# Patient Record
Sex: MALE | Race: WHITE | NOT HISPANIC OR LATINO | Employment: OTHER | RURAL
[De-identification: names, ages, dates, MRNs, and addresses within clinical notes are randomized per-mention and may not be internally consistent; named-entity substitution may affect disease eponyms.]

---

## 2017-02-06 ENCOUNTER — OFFICE VISIT (OUTPATIENT)
Dept: FAMILY MEDICINE CLINIC | Facility: CLINIC | Age: 42
End: 2017-02-06

## 2017-02-06 VITALS
HEART RATE: 92 BPM | OXYGEN SATURATION: 98 % | DIASTOLIC BLOOD PRESSURE: 80 MMHG | WEIGHT: 260 LBS | HEIGHT: 71 IN | TEMPERATURE: 97.9 F | SYSTOLIC BLOOD PRESSURE: 132 MMHG | BODY MASS INDEX: 36.4 KG/M2

## 2017-02-06 DIAGNOSIS — M54.50 CHRONIC BILATERAL LOW BACK PAIN WITHOUT SCIATICA: Primary | ICD-10-CM

## 2017-02-06 DIAGNOSIS — G89.29 CHRONIC BILATERAL LOW BACK PAIN WITHOUT SCIATICA: Primary | ICD-10-CM

## 2017-02-06 PROCEDURE — 99212 OFFICE O/P EST SF 10 MIN: CPT | Performed by: FAMILY MEDICINE

## 2017-02-06 RX ORDER — HYDROCODONE BITARTRATE AND ACETAMINOPHEN 7.5; 325 MG/1; MG/1
1 TABLET ORAL 3 TIMES DAILY
Qty: 90 TABLET | Refills: 0 | Status: SHIPPED | OUTPATIENT
Start: 2017-02-06 | End: 2017-03-06 | Stop reason: SDUPTHER

## 2017-02-06 NOTE — PROGRESS NOTES
"Trevor Hernandez    1975    Chief Complaint   Patient presents with   • Med Refill     FLEX       Vitals:    02/06/17 1532   BP: 132/80   Pulse: 92   Temp: 97.9 °F (36.6 °C)   SpO2: 98%   Weight: 260 lb (118 kg)   Height: 71\" (180.3 cm)       Back Pain   This is a chronic problem. The current episode started more than 1 year ago. The problem occurs constantly. The problem is unchanged. The pain is present in the sacro-iliac. The quality of the pain is described as aching. The pain does not radiate. The pain is at a severity of 4/10. The pain is moderate. The pain is the same all the time. The symptoms are aggravated by bending and sitting. Stiffness is present in the morning. Risk factors: Lumbar laminectomy. He has tried heat and home exercises for the symptoms. The treatment provided mild relief.       Review of Systems   Endocrine:        Diabetic-sees diabetologist   Musculoskeletal: Positive for back pain.       Past Medical History   Diagnosis Date   • Chronic painful diabetic neuropathy    • Diabetic neuropathy    • Dorsopathy    • Dyslipidemia    • Elevated blood pressure    • Hypertension    • Neuromuscular disorder    • Obesity    • Type 2 diabetes mellitus          Current Outpatient Prescriptions:   •  Albiglutide (TANZEUM) 50 MG pen-injector, Inject 50 mg under the skin 1 (One) Time Per Week., Disp: , Rfl:   •  butalbital-acetaminophen-caffeine (FIORICET, ESGIC) -40 MG per tablet, Take  tablets by mouth Every 6 (Six) Hours As Needed., Disp: , Rfl:   •  Canagliflozin (INVOKANA) 100 MG tablet, Take 100 mg by mouth Daily., Disp: , Rfl:   •  Canagliflozin-Metformin HCl (INVOKAMET)  MG tablet, Take 1 tablet by mouth 2 (Two) Times a Day. one tablet po bid. discontinue separate metformin and invokana prescription since these combines the 2 medicines., Disp: , Rfl:   •  cyclobenzaprine (FLEXERIL) 10 MG tablet, Take 10 mg by mouth Every 8 (Eight) Hours As Needed., Disp: , Rfl:   • " " Dapagliflozin-Metformin HCl ER (XIGDUO XR) 5-1000 MG tablet sustained-release 24 hour, Take 2 tablets by mouth Daily With Breakfast., Disp: , Rfl:   •  gabapentin (NEURONTIN) 800 MG tablet, Take 1,200 mg by mouth 3 (Three) Times a Day., Disp: , Rfl:   •  HUMALOG KWIKPEN 100 UNIT/ML solution pen-injector, , Disp: , Rfl:   •  HYDROcodone-acetaminophen (NORCO) 7.5-325 MG per tablet, Take 1 tablet by mouth 3 (Three) Times a Day., Disp: 90 tablet, Rfl: 0  •  Insulin Glulisine (APIDRA SOLOSTAR) 100 UNIT/ML solution pen-injector, Inject 15 Units under the skin. take up to 15 units with meals, if not covered use humalog or novolog., Disp: , Rfl:   •  Insulin Pen Needle (PEN NEEDLES) 31G X 8 MM misc, 3 (Three) Times a Day. use as indicated 3 times daily., Disp: , Rfl:   •  losartan-hydrochlorothiazide (HYZAAR) 100-12.5 MG per tablet, Take 1 tablet by mouth Every Morning., Disp: , Rfl:   •  sertraline (ZOLOFT) 100 MG tablet, Take 100 mg by mouth Daily., Disp: , Rfl:     No Known Allergies    Patient Active Problem List   Diagnosis   • Dorsopathy   • Type II diabetes mellitus, uncontrolled   • Essential hypertension   • Lumbar disc disease with radiculopathy   • Morbid obesity       Social History     Social History   • Marital status:      Spouse name: N/A   • Number of children: N/A   • Years of education: N/A     Social History Main Topics   • Smoking status: Never Smoker   • Smokeless tobacco: None   • Alcohol use None   • Drug use: None   • Sexual activity: Not Asked     Other Topics Concern   • None     Social History Narrative       Past Surgical History   Procedure Laterality Date   • Back surgery         Physical Exam   Constitutional:   Morbidly obese   Neurological: He is alert.   Patellar reflex down the right   Psychiatric: He has a normal mood and affect.   Vitals reviewed.      Vitals:    02/06/17 1532   BP: 132/80   Pulse: 92   Temp: 97.9 °F (36.6 °C)   SpO2: 98%   Weight: 260 lb (118 kg)   Height: 71\" " (180.3 cm)       Trevor was seen today for med refill.    Diagnoses and all orders for this visit:    Chronic bilateral low back pain without sciatica    Other orders  -     HYDROcodone-acetaminophen (NORCO) 7.5-325 MG per tablet; Take 1 tablet by mouth 3 (Three) Times a Day.        Problem List Items Addressed This Visit     None      Visit Diagnoses     Chronic bilateral low back pain without sciatica    -  Primary       Narco 7.5-#90 refill    Core exercises stressed, weight reduction stressed                            Efrem Deng MD  2/6/2017

## 2017-02-10 ENCOUNTER — LAB (OUTPATIENT)
Dept: LAB | Facility: HOSPITAL | Age: 42
End: 2017-02-10
Attending: INTERNAL MEDICINE

## 2017-02-10 ENCOUNTER — TRANSCRIBE ORDERS (OUTPATIENT)
Dept: LAB | Facility: HOSPITAL | Age: 42
End: 2017-02-10

## 2017-02-10 ENCOUNTER — APPOINTMENT (OUTPATIENT)
Dept: LAB | Facility: HOSPITAL | Age: 42
End: 2017-02-10

## 2017-02-10 DIAGNOSIS — E11.9 TYPE 2 DIABETES MELLITUS WITHOUT COMPLICATION, UNSPECIFIED LONG TERM INSULIN USE STATUS: Primary | ICD-10-CM

## 2017-02-10 DIAGNOSIS — E11.8 TYPE 2 DIABETES MELLITUS WITH COMPLICATION, UNSPECIFIED LONG TERM INSULIN USE STATUS: ICD-10-CM

## 2017-02-10 DIAGNOSIS — E11.8 TYPE 2 DIABETES MELLITUS WITH COMPLICATION, UNSPECIFIED LONG TERM INSULIN USE STATUS: Primary | ICD-10-CM

## 2017-02-10 LAB
25(OH)D3 SERPL-MCNC: 21.1 NG/ML (ref 30–100)
ALBUMIN SERPL-MCNC: 4 G/DL (ref 3.4–4.8)
ALBUMIN UR-MCNC: 2.9 MG/L
ALBUMIN/GLOB SERPL: 1.1 G/DL (ref 1.1–1.8)
ALP SERPL-CCNC: 90 U/L (ref 38–126)
ALT SERPL W P-5'-P-CCNC: 48 U/L (ref 21–72)
ANION GAP SERPL CALCULATED.3IONS-SCNC: 12 MMOL/L (ref 5–15)
ARTICHOKE IGE QN: 77 MG/DL (ref 1–129)
AST SERPL-CCNC: 34 U/L (ref 17–59)
BILIRUB SERPL-MCNC: 1 MG/DL (ref 0.2–1.3)
BUN BLD-MCNC: 12 MG/DL (ref 7–21)
BUN/CREAT SERPL: 18.8 (ref 7–25)
CALCIUM SPEC-SCNC: 8.8 MG/DL (ref 8.4–10.2)
CHLORIDE SERPL-SCNC: 99 MMOL/L (ref 95–110)
CHOLEST SERPL-MCNC: 182 MG/DL (ref 0–199)
CO2 SERPL-SCNC: 25 MMOL/L (ref 22–31)
CREAT BLD-MCNC: 0.64 MG/DL (ref 0.7–1.3)
GFR SERPL CREATININE-BSD FRML MDRD: 139 ML/MIN/1.73
GLOBULIN UR ELPH-MCNC: 3.6 GM/DL (ref 2.3–3.5)
GLUCOSE BLD-MCNC: 195 MG/DL (ref 60–100)
HBA1C MFR BLD: 9.62 % (ref 4–5.6)
HCT VFR BLD AUTO: 42.3 % (ref 39–49)
HDLC SERPL-MCNC: 35 MG/DL (ref 60–200)
HGB BLD-MCNC: 15 G/DL (ref 13.7–17.3)
LDLC/HDLC SERPL: ABNORMAL {RATIO} (ref 0–3.55)
POTASSIUM BLD-SCNC: 4.2 MMOL/L (ref 3.5–5.1)
PROT SERPL-MCNC: 7.6 G/DL (ref 6.3–8.6)
SODIUM BLD-SCNC: 136 MMOL/L (ref 137–145)
TRIGL SERPL-MCNC: 503 MG/DL (ref 20–199)
TSH SERPL DL<=0.05 MIU/L-ACNC: 0.94 MIU/ML (ref 0.46–4.68)
VIT B12 BLD-MCNC: 718 PG/ML (ref 239–931)

## 2017-02-10 PROCEDURE — 80061 LIPID PANEL: CPT

## 2017-02-10 PROCEDURE — 84443 ASSAY THYROID STIM HORMONE: CPT

## 2017-02-10 PROCEDURE — 84156 ASSAY OF PROTEIN URINE: CPT

## 2017-02-10 PROCEDURE — 82306 VITAMIN D 25 HYDROXY: CPT

## 2017-02-10 PROCEDURE — 82043 UR ALBUMIN QUANTITATIVE: CPT

## 2017-02-10 PROCEDURE — 80053 COMPREHEN METABOLIC PANEL: CPT

## 2017-02-10 PROCEDURE — 85014 HEMATOCRIT: CPT

## 2017-02-10 PROCEDURE — 83036 HEMOGLOBIN GLYCOSYLATED A1C: CPT

## 2017-02-10 PROCEDURE — 82607 VITAMIN B-12: CPT

## 2017-02-10 PROCEDURE — 85018 HEMOGLOBIN: CPT

## 2017-02-10 PROCEDURE — 82570 ASSAY OF URINE CREATININE: CPT

## 2017-02-10 PROCEDURE — 36415 COLL VENOUS BLD VENIPUNCTURE: CPT

## 2017-02-11 LAB
CREAT 24H UR-MCNC: 216.5 MG/DL
PROT UR-MCNC: 16.6 MG/DL
PROT/CREAT UR: 77 MG/G CREAT (ref 0–200)

## 2017-02-14 ENCOUNTER — OFFICE VISIT (OUTPATIENT)
Dept: ENDOCRINOLOGY | Facility: CLINIC | Age: 42
End: 2017-02-14

## 2017-02-14 VITALS
DIASTOLIC BLOOD PRESSURE: 80 MMHG | BODY MASS INDEX: 42.24 KG/M2 | HEART RATE: 81 BPM | WEIGHT: 285.2 LBS | HEIGHT: 69 IN | SYSTOLIC BLOOD PRESSURE: 130 MMHG

## 2017-02-14 DIAGNOSIS — E11.42 TYPE 2 DIABETES MELLITUS WITH DIABETIC POLYNEUROPATHY, WITH LONG-TERM CURRENT USE OF INSULIN (HCC): Primary | ICD-10-CM

## 2017-02-14 DIAGNOSIS — Z79.4 TYPE 2 DIABETES MELLITUS WITH DIABETIC POLYNEUROPATHY, WITH LONG-TERM CURRENT USE OF INSULIN (HCC): Primary | ICD-10-CM

## 2017-02-14 DIAGNOSIS — I10 ESSENTIAL HYPERTENSION: ICD-10-CM

## 2017-02-14 LAB — GLUCOSE BLDC GLUCOMTR-MCNC: 240 MG/DL (ref 70–130)

## 2017-02-14 PROCEDURE — 82962 GLUCOSE BLOOD TEST: CPT | Performed by: INTERNAL MEDICINE

## 2017-02-14 PROCEDURE — 99214 OFFICE O/P EST MOD 30 MIN: CPT | Performed by: INTERNAL MEDICINE

## 2017-02-14 RX ORDER — GABAPENTIN 800 MG/1
1200 TABLET ORAL 3 TIMES DAILY
Qty: 135 TABLET | Refills: 0 | Status: SHIPPED | OUTPATIENT
Start: 2017-02-14 | End: 2017-03-16

## 2017-02-14 RX ORDER — PEN NEEDLE, DIABETIC 30 GX3/16"
NEEDLE, DISPOSABLE MISCELLANEOUS
Qty: 120 EACH | Refills: 11 | Status: SHIPPED | OUTPATIENT
Start: 2017-02-14

## 2017-02-14 NOTE — PROGRESS NOTES
Trevor Hernandez is a 41 y.o. male who presents for  evaluation of   Chief Complaint   Patient presents with   • Diabetes         Primary Care / Referring Provider  Efrem Deng MD    Duration/Timing:  Diabetes mellitus type 2, Age at onset of diabetes: 37 years, Onset of symptoms gradual  timing - constant    quality - not controlled,     severity - moderate    Severity (Complications/Hospitalizations)  Secondary Macrovascular Complications:  No CAD, No CVA, No PAD  Secondary Microvascular Complications:  No Diabetic Nephropathy, No proteinuria, No Diabetic Retinopathy, Diabetic Neuropathy    Context  Diabetes Regimen:  Oral Medications, Side effects from regimen hypoglcyemia,   Lab Results   Component Value Date    HGBA1C 9.62 (H) 02/10/2017     Lab Results   Component Value Date    MICROALBUR 2.9 02/10/2017    CREATININE 0.64 (L) 02/10/2017       Blood Glucose Readings  180s  Diet  variable, high carb  Exercise:  Does not exercise    Associated Signs/Symptoms  Hyperglycemic Symptoms:  No polyuria, No polydipsia, No polyphagia  Hypoglycemic Episodes:  No documented hypoglycemia    Past Medical History   Diagnosis Date   • Chronic painful diabetic neuropathy    • Diabetic neuropathy    • Dorsopathy    • Dyslipidemia    • Elevated blood pressure    • Hypertension    • Neuromuscular disorder    • Obesity    • Type 2 diabetes mellitus      Family History   Problem Relation Age of Onset   • Diabetes Other    • Heart disease Other      Social History   Substance Use Topics   • Smoking status: Never Smoker   • Smokeless tobacco: Not on file   • Alcohol use Not on file         Current Outpatient Prescriptions:   •  Albiglutide (TANZEUM) 50 MG pen-injector, Inject 50 mg under the skin 1 (One) Time Per Week., Disp: , Rfl:   •  butalbital-acetaminophen-caffeine (FIORICET, ESGIC) -40 MG per tablet, Take  tablets by mouth Every 6 (Six) Hours As Needed., Disp: , Rfl:   •  Canagliflozin (INVOKANA)  100 MG tablet, Take 100 mg by mouth Daily., Disp: , Rfl:   •  Canagliflozin-Metformin HCl (INVOKAMET)  MG tablet, Take 1 tablet by mouth 2 (Two) Times a Day. one tablet po bid. discontinue separate metformin and invokana prescription since these combines the 2 medicines., Disp: , Rfl:   •  cyclobenzaprine (FLEXERIL) 10 MG tablet, Take 10 mg by mouth Every 8 (Eight) Hours As Needed., Disp: , Rfl:   •  Dapagliflozin-Metformin HCl ER (XIGDUO XR) 5-1000 MG tablet sustained-release 24 hour, Take 2 tablets by mouth Daily With Breakfast., Disp: , Rfl:   •  gabapentin (NEURONTIN) 800 MG tablet, Take 1,200 mg by mouth 3 (Three) Times a Day., Disp: , Rfl:   •  HUMALOG KWIKPEN 100 UNIT/ML solution pen-injector, , Disp: , Rfl:   •  HYDROcodone-acetaminophen (NORCO) 7.5-325 MG per tablet, Take 1 tablet by mouth 3 (Three) Times a Day., Disp: 90 tablet, Rfl: 0  •  Insulin Glulisine (APIDRA SOLOSTAR) 100 UNIT/ML solution pen-injector, Inject 15 Units under the skin. take up to 15 units with meals, if not covered use humalog or novolog., Disp: , Rfl:   •  Insulin Pen Needle (PEN NEEDLES) 31G X 8 MM misc, 3 (Three) Times a Day. use as indicated 3 times daily., Disp: , Rfl:   •  losartan-hydrochlorothiazide (HYZAAR) 100-12.5 MG per tablet, Take 1 tablet by mouth Every Morning., Disp: , Rfl:   •  sertraline (ZOLOFT) 100 MG tablet, Take 100 mg by mouth Daily., Disp: , Rfl:     Review of Systems    Review of Systems   Constitutional: Negative for activity change, appetite change, chills, diaphoresis, fatigue, fever and unexpected weight change.   HENT: Negative for congestion, dental problem, drooling, ear discharge, ear pain, facial swelling, mouth sores, postnasal drip, rhinorrhea, sinus pressure, sore throat, tinnitus, trouble swallowing and voice change.    Eyes: Negative for photophobia, pain, discharge, redness, itching and visual disturbance.   Respiratory: Negative for apnea, cough, choking, chest tightness, shortness of  "breath, wheezing and stridor.    Cardiovascular: Negative for chest pain, palpitations and leg swelling.   Gastrointestinal: Negative for abdominal distention, abdominal pain, constipation, diarrhea, nausea and vomiting.   Endocrine: Negative for cold intolerance, heat intolerance, polydipsia, polyphagia and polyuria.   Genitourinary: Negative for decreased urine volume, difficulty urinating, dysuria, flank pain, frequency, hematuria and urgency.   Musculoskeletal: Negative for arthralgias, back pain, gait problem, joint swelling, myalgias, neck pain and neck stiffness.   Skin: Negative for color change, pallor, rash and wound.   Allergic/Immunologic: Negative for immunocompromised state.   Neurological: Negative for dizziness, tremors, seizures, syncope, facial asymmetry, speech difficulty, weakness, light-headedness, numbness and headaches.   Hematological: Negative for adenopathy.   Psychiatric/Behavioral: Negative for agitation, behavioral problems, confusion, decreased concentration, dysphoric mood, hallucinations, self-injury, sleep disturbance and suicidal ideas. The patient is not nervous/anxious and is not hyperactive.         Objective:     Visit Vitals   • /80 (BP Location: Right arm, Patient Position: Sitting, Cuff Size: Large Adult)   • Pulse 81   • Ht 69\" (175.3 cm)   • Wt 285 lb 3.2 oz (129 kg)   • BMI 42.12 kg/m2       Physical Exam   Constitutional: He is oriented to person, place, and time. He appears well-developed and well-nourished. He is cooperative.   HENT:   Head: Normocephalic and atraumatic.   Right Ear: External ear normal.   Left Ear: External ear normal.   Nose: Nose normal.   Mouth/Throat: Oropharynx is clear and moist. No oropharyngeal exudate.   Eyes: Conjunctivae and EOM are normal. Pupils are equal, round, and reactive to light. No scleral icterus. Right eye exhibits normal extraocular motion. Left eye exhibits normal extraocular motion.   Neck: Neck supple. No JVD present. No " muscular tenderness present. No tracheal deviation, no edema and no erythema present. No thyromegaly present.   Cardiovascular: Normal rate, regular rhythm, normal heart sounds and intact distal pulses.  Exam reveals no gallop and no friction rub.    No murmur heard.  Pulmonary/Chest: Effort normal and breath sounds normal. No stridor. No respiratory distress. He has no decreased breath sounds. He has no wheezes. He has no rhonchi. He has no rales. He exhibits no tenderness.   Abdominal: Soft. Bowel sounds are normal. He exhibits no distension and no mass. There is no hepatomegaly. There is no tenderness. There is no rebound and no guarding. No hernia.   Musculoskeletal: Normal range of motion. He exhibits no edema, tenderness or deformity.   Lymphadenopathy:     He has no cervical adenopathy.   Neurological: He is alert and oriented to person, place, and time. He has normal reflexes. No cranial nerve deficit. He exhibits normal muscle tone. Coordination normal.   Skin: Skin is warm. No rash noted. No erythema. No pallor.   Psychiatric: He has a normal mood and affect. His behavior is normal. Judgment and thought content normal.   Nursing note and vitals reviewed.      Lab Review    Results for orders placed or performed in visit on 02/14/17   POC Glucose Fingerstick   Result Value Ref Range    Glucose 240 (A) 70 - 130 mg/dL           Assessment/Plan       ICD-10-CM ICD-9-CM   1. Uncontrolled type 2 diabetes mellitus with diabetic polyneuropathy, with long-term current use of insulin E11.42 250.62    Z79.4 357.2    E11.65 V58.67     Lab Results   Component Value Date    HGBA1C 9.62 (H) 02/10/2017     Lab Results   Component Value Date    MICROALBUR 2.9 02/10/2017    CREATININE 0.64 (L) 02/10/2017     Lab Results   Component Value Date    GLUCOSE 195 (H) 02/10/2017    CALCIUM 8.8 02/10/2017     (L) 02/10/2017    K 4.2 02/10/2017    CO2 25.0 02/10/2017    CL 99 02/10/2017    BUN 12 02/10/2017    CREATININE 0.64  (L) 02/10/2017    EGFRIFNONA 139 02/10/2017    BCR 18.8 02/10/2017    ANIONGAP 12.0 02/10/2017         on xigduo xr full dose +  tanzeum 50 mg weekly    fasting not a problem    biggest meal is supper    Add toujeo 10 units with supper , increase by 2 units every 3 days until fasting less than 130     restart apridra or novolog or humalog for supper and big meals , do by experience , start with 5 units to 20   Lipid Management  Lab Results   Component Value Date    CHOL 182 02/10/2017     Lab Results   Component Value Date    TRIG 503 (H) 02/10/2017     Lab Results   Component Value Date    HDL 35 (L) 02/10/2017     LDL 77    ASCVD was 10 y 3.6%    reevaluate    no statin but weight reduction  Blood Pressure Management  controlled on lisinopril   Microvascular Complication Monitoring:  No Microalbuminuria, Date of last Microalbumin Assessment 07/22/2016, No Diabetic Retinopathy, Diabetic Neuropathy  eye exam scheduled    for neuropathy - neurontin  1200 tid   component of back pain , has had surgery     start lyrica 75 mg bid , too expensive and similar efficacy     suggested anodyne therapy - not working   Immunizations:  Last influenza immunization 2015, Last pneumococcal immunization 2015  Preventive Care:  Patient is not smoking  Weight Related:  Obesity, Counseled on nutrition, Counseled on physical activity  sleep study suggested    he prefers to wait  [facility]  July 2016    vit D 26   Other Diabetes Related Aspects    Lab Results   Component Value Date    TSH 0.940 02/10/2017     Lab Results   Component Value Date    LQCZGQLD87 718 02/10/2017         I reviewed and summarized records from Efrem Deng MD from 2017 and I reviewed / ordered labs.     Orders Placed This Encounter   Procedures   • POC Glucose Fingerstick         A copy of my note was sent to Efrem Deng MD    Please see my above opinion and suggestions.

## 2017-03-06 RX ORDER — HYDROCODONE BITARTRATE AND ACETAMINOPHEN 7.5; 325 MG/1; MG/1
1 TABLET ORAL 3 TIMES DAILY
Qty: 90 TABLET | Refills: 0 | Status: SHIPPED | OUTPATIENT
Start: 2017-03-06 | End: 2017-04-07 | Stop reason: SDUPTHER

## 2017-03-27 RX ORDER — SERTRALINE HYDROCHLORIDE 100 MG/1
100 TABLET, FILM COATED ORAL DAILY
Qty: 90 TABLET | Refills: 2 | Status: SHIPPED | OUTPATIENT
Start: 2017-03-27 | End: 2018-02-07 | Stop reason: SDUPTHER

## 2017-03-29 ENCOUNTER — HOSPITAL ENCOUNTER (INPATIENT)
Facility: HOSPITAL | Age: 42
LOS: 2 days | Discharge: HOME | End: 2017-03-31
Attending: EMERGENCY MEDICINE | Admitting: INTERNAL MEDICINE

## 2017-03-29 ENCOUNTER — APPOINTMENT (OUTPATIENT)
Dept: GENERAL RADIOLOGY | Facility: HOSPITAL | Age: 42
End: 2017-03-29

## 2017-03-29 DIAGNOSIS — I20.0 UNSTABLE ANGINA (HCC): Primary | ICD-10-CM

## 2017-03-29 LAB
ANION GAP SERPL CALCULATED.3IONS-SCNC: 14 MMOL/L (ref 5–15)
APTT PPP: 25.1 SECONDS (ref 20–40.3)
BASOPHILS # BLD AUTO: 0.04 10*3/MM3 (ref 0–0.2)
BASOPHILS NFR BLD AUTO: 0.5 % (ref 0–2)
BUN BLD-MCNC: 10 MG/DL (ref 7–21)
BUN/CREAT SERPL: 15.4 (ref 7–25)
CALCIUM SPEC-SCNC: 9.1 MG/DL (ref 8.4–10.2)
CHLORIDE SERPL-SCNC: 98 MMOL/L (ref 95–110)
CK MB SERPL-CCNC: 0.31 NG/ML (ref 0–5)
CK MB SERPL-CCNC: 0.36 NG/ML (ref 0–5)
CK MB SERPL-CCNC: 0.41 NG/ML (ref 0–5)
CK SERPL-CCNC: 27 U/L (ref 55–170)
CK SERPL-CCNC: 29 U/L (ref 55–170)
CK SERPL-CCNC: 32 U/L (ref 55–170)
CO2 SERPL-SCNC: 23 MMOL/L (ref 22–31)
CREAT BLD-MCNC: 0.65 MG/DL (ref 0.7–1.3)
D-DIMER, QUANTITATIVE (MAD,POW, STR): 296 NG/ML (FEU) (ref 0–470)
DEPRECATED RDW RBC AUTO: 36.5 FL (ref 35.1–43.9)
EOSINOPHIL # BLD AUTO: 0.18 10*3/MM3 (ref 0–0.7)
EOSINOPHIL NFR BLD AUTO: 2.1 % (ref 0–7)
ERYTHROCYTE [DISTWIDTH] IN BLOOD BY AUTOMATED COUNT: 12.5 % (ref 11.5–14.5)
GFR SERPL CREATININE-BSD FRML MDRD: 136 ML/MIN/1.73 (ref 63–147)
GLUCOSE BLD-MCNC: 255 MG/DL (ref 60–100)
GLUCOSE BLDC GLUCOMTR-MCNC: 161 MG/DL (ref 70–130)
GLUCOSE BLDC GLUCOMTR-MCNC: 208 MG/DL (ref 70–130)
GLUCOSE BLDC GLUCOMTR-MCNC: 208 MG/DL (ref 70–130)
HBA1C MFR BLD: 9.87 % (ref 4–5.6)
HCT VFR BLD AUTO: 42.2 % (ref 39–49)
HGB BLD-MCNC: 15.3 G/DL (ref 13.7–17.3)
HOLD SPECIMEN: NORMAL
HOLD SPECIMEN: NORMAL
IMM GRANULOCYTES # BLD: 0.03 10*3/MM3 (ref 0–0.02)
IMM GRANULOCYTES NFR BLD: 0.4 % (ref 0–0.5)
INR PPP: 0.92 (ref 0.8–1.2)
LYMPHOCYTES # BLD AUTO: 2.53 10*3/MM3 (ref 0.6–4.2)
LYMPHOCYTES NFR BLD AUTO: 29.8 % (ref 10–50)
MCH RBC QN AUTO: 29.4 PG (ref 26.5–34)
MCHC RBC AUTO-ENTMCNC: 36.3 G/DL (ref 31.5–36.3)
MCV RBC AUTO: 81 FL (ref 80–98)
MONOCYTES # BLD AUTO: 0.61 10*3/MM3 (ref 0–0.9)
MONOCYTES NFR BLD AUTO: 7.2 % (ref 0–12)
NEUTROPHILS # BLD AUTO: 5.09 10*3/MM3 (ref 2–8.6)
NEUTROPHILS NFR BLD AUTO: 60 % (ref 37–80)
PLATELET # BLD AUTO: 268 10*3/MM3 (ref 150–450)
PMV BLD AUTO: 9.9 FL (ref 8–12)
POTASSIUM BLD-SCNC: 4.3 MMOL/L (ref 3.5–5.1)
PROTHROMBIN TIME: 12.3 SECONDS (ref 11.1–15.3)
RBC # BLD AUTO: 5.21 10*6/MM3 (ref 4.37–5.74)
SODIUM BLD-SCNC: 135 MMOL/L (ref 137–145)
TROPONIN I SERPL-MCNC: 0.03 NG/ML
TROPONIN I SERPL-MCNC: <0.012 NG/ML
WBC NRBC COR # BLD: 8.48 10*3/MM3 (ref 3.2–9.8)
WHOLE BLOOD HOLD SPECIMEN: NORMAL
WHOLE BLOOD HOLD SPECIMEN: NORMAL

## 2017-03-29 PROCEDURE — 71010 HC CHEST PA OR AP: CPT

## 2017-03-29 PROCEDURE — 84484 ASSAY OF TROPONIN QUANT: CPT | Performed by: EMERGENCY MEDICINE

## 2017-03-29 PROCEDURE — 93005 ELECTROCARDIOGRAM TRACING: CPT

## 2017-03-29 PROCEDURE — 25010000002 HEPARIN (PORCINE) PER 1000 UNITS: Performed by: INTERNAL MEDICINE

## 2017-03-29 PROCEDURE — 84484 ASSAY OF TROPONIN QUANT: CPT | Performed by: INTERNAL MEDICINE

## 2017-03-29 PROCEDURE — 25010000002 ONDANSETRON PER 1 MG: Performed by: EMERGENCY MEDICINE

## 2017-03-29 PROCEDURE — 82550 ASSAY OF CK (CPK): CPT | Performed by: INTERNAL MEDICINE

## 2017-03-29 PROCEDURE — 85730 THROMBOPLASTIN TIME PARTIAL: CPT | Performed by: EMERGENCY MEDICINE

## 2017-03-29 PROCEDURE — 25010000002 ONDANSETRON PER 1 MG: Performed by: INTERNAL MEDICINE

## 2017-03-29 PROCEDURE — 82962 GLUCOSE BLOOD TEST: CPT

## 2017-03-29 PROCEDURE — 63710000001 INSULIN ASPART PER 5 UNITS: Performed by: INTERNAL MEDICINE

## 2017-03-29 PROCEDURE — 82553 CREATINE MB FRACTION: CPT | Performed by: EMERGENCY MEDICINE

## 2017-03-29 PROCEDURE — 85610 PROTHROMBIN TIME: CPT | Performed by: EMERGENCY MEDICINE

## 2017-03-29 PROCEDURE — 82550 ASSAY OF CK (CPK): CPT | Performed by: EMERGENCY MEDICINE

## 2017-03-29 PROCEDURE — 63710000001 INSULIN DETEMIR PER 5 UNITS: Performed by: INTERNAL MEDICINE

## 2017-03-29 PROCEDURE — 85025 COMPLETE CBC W/AUTO DIFF WBC: CPT | Performed by: EMERGENCY MEDICINE

## 2017-03-29 PROCEDURE — 93010 ELECTROCARDIOGRAM REPORT: CPT | Performed by: INTERNAL MEDICINE

## 2017-03-29 PROCEDURE — 63710000001 INSULIN REGULAR HUMAN PER 5 UNITS: Performed by: EMERGENCY MEDICINE

## 2017-03-29 PROCEDURE — 85379 FIBRIN DEGRADATION QUANT: CPT | Performed by: EMERGENCY MEDICINE

## 2017-03-29 PROCEDURE — 82553 CREATINE MB FRACTION: CPT | Performed by: INTERNAL MEDICINE

## 2017-03-29 PROCEDURE — 99285 EMERGENCY DEPT VISIT HI MDM: CPT

## 2017-03-29 PROCEDURE — 80048 BASIC METABOLIC PNL TOTAL CA: CPT | Performed by: EMERGENCY MEDICINE

## 2017-03-29 PROCEDURE — 25010000002 ENOXAPARIN PER 10 MG: Performed by: EMERGENCY MEDICINE

## 2017-03-29 PROCEDURE — 25010000002 MORPHINE PER 10 MG: Performed by: EMERGENCY MEDICINE

## 2017-03-29 PROCEDURE — 93005 ELECTROCARDIOGRAM TRACING: CPT | Performed by: EMERGENCY MEDICINE

## 2017-03-29 PROCEDURE — 83036 HEMOGLOBIN GLYCOSYLATED A1C: CPT | Performed by: INTERNAL MEDICINE

## 2017-03-29 RX ORDER — BISACODYL 10 MG
10 SUPPOSITORY, RECTAL RECTAL DAILY PRN
Status: DISCONTINUED | OUTPATIENT
Start: 2017-03-29 | End: 2017-03-31 | Stop reason: HOSPADM

## 2017-03-29 RX ORDER — ASPIRIN 81 MG/1
81 TABLET, CHEWABLE ORAL DAILY
Status: DISCONTINUED | OUTPATIENT
Start: 2017-03-30 | End: 2017-03-31 | Stop reason: HOSPADM

## 2017-03-29 RX ORDER — PANTOPRAZOLE SODIUM 40 MG/1
40 TABLET, DELAYED RELEASE ORAL
Status: DISCONTINUED | OUTPATIENT
Start: 2017-03-30 | End: 2017-03-31 | Stop reason: HOSPADM

## 2017-03-29 RX ORDER — ONDANSETRON 2 MG/ML
4 INJECTION INTRAMUSCULAR; INTRAVENOUS ONCE
Status: COMPLETED | OUTPATIENT
Start: 2017-03-29 | End: 2017-03-29

## 2017-03-29 RX ORDER — HYDROCODONE BITARTRATE AND ACETAMINOPHEN 7.5; 325 MG/1; MG/1
1 TABLET ORAL 3 TIMES DAILY
Status: DISCONTINUED | OUTPATIENT
Start: 2017-03-29 | End: 2017-03-31 | Stop reason: HOSPADM

## 2017-03-29 RX ORDER — ASPIRIN 81 MG/1
324 TABLET, CHEWABLE ORAL ONCE
Status: COMPLETED | OUTPATIENT
Start: 2017-03-29 | End: 2017-03-29

## 2017-03-29 RX ORDER — FAMOTIDINE 20 MG/1
20 TABLET, FILM COATED ORAL 2 TIMES DAILY
Status: DISCONTINUED | OUTPATIENT
Start: 2017-03-29 | End: 2017-03-31 | Stop reason: HOSPADM

## 2017-03-29 RX ORDER — SODIUM CHLORIDE 9 MG/ML
75 INJECTION, SOLUTION INTRAVENOUS CONTINUOUS
Status: DISCONTINUED | OUTPATIENT
Start: 2017-03-29 | End: 2017-03-31 | Stop reason: HOSPADM

## 2017-03-29 RX ORDER — NITROGLYCERIN 20 MG/100ML
10-50 INJECTION INTRAVENOUS
Status: DISCONTINUED | OUTPATIENT
Start: 2017-03-29 | End: 2017-03-30

## 2017-03-29 RX ORDER — MORPHINE SULFATE 4 MG/ML
4 INJECTION, SOLUTION INTRAMUSCULAR; INTRAVENOUS ONCE
Status: COMPLETED | OUTPATIENT
Start: 2017-03-29 | End: 2017-03-29

## 2017-03-29 RX ORDER — INSULIN GLARGINE 100 [IU]/ML
30 INJECTION, SOLUTION SUBCUTANEOUS NIGHTLY
Status: DISCONTINUED | OUTPATIENT
Start: 2017-03-29 | End: 2017-03-29 | Stop reason: CLARIF

## 2017-03-29 RX ORDER — HEPARIN SODIUM 5000 [USP'U]/ML
5000 INJECTION, SOLUTION INTRAVENOUS; SUBCUTANEOUS EVERY 8 HOURS SCHEDULED
Status: DISCONTINUED | OUTPATIENT
Start: 2017-03-29 | End: 2017-03-30

## 2017-03-29 RX ORDER — GABAPENTIN 800 MG/1
800 TABLET ORAL 3 TIMES DAILY
COMMUNITY
End: 2017-11-20

## 2017-03-29 RX ORDER — NICOTINE POLACRILEX 4 MG
15 LOZENGE BUCCAL
Status: DISCONTINUED | OUTPATIENT
Start: 2017-03-29 | End: 2017-03-31 | Stop reason: HOSPADM

## 2017-03-29 RX ORDER — CYCLOBENZAPRINE HCL 10 MG
10 TABLET ORAL 3 TIMES DAILY PRN
Status: DISCONTINUED | OUTPATIENT
Start: 2017-03-29 | End: 2017-03-31 | Stop reason: HOSPADM

## 2017-03-29 RX ORDER — SODIUM CHLORIDE 9 MG/ML
100 INJECTION, SOLUTION INTRAVENOUS CONTINUOUS
Status: DISCONTINUED | OUTPATIENT
Start: 2017-03-29 | End: 2017-03-29 | Stop reason: SDUPTHER

## 2017-03-29 RX ORDER — DOCUSATE SODIUM 100 MG/1
200 CAPSULE, LIQUID FILLED ORAL 2 TIMES DAILY
Status: DISCONTINUED | OUTPATIENT
Start: 2017-03-29 | End: 2017-03-31 | Stop reason: HOSPADM

## 2017-03-29 RX ORDER — ONDANSETRON 4 MG/1
4 TABLET, ORALLY DISINTEGRATING ORAL EVERY 6 HOURS PRN
Status: DISCONTINUED | OUTPATIENT
Start: 2017-03-29 | End: 2017-03-31 | Stop reason: HOSPADM

## 2017-03-29 RX ORDER — SODIUM CHLORIDE 0.9 % (FLUSH) 0.9 %
1-10 SYRINGE (ML) INJECTION AS NEEDED
Status: DISCONTINUED | OUTPATIENT
Start: 2017-03-29 | End: 2017-03-31 | Stop reason: HOSPADM

## 2017-03-29 RX ORDER — ACETAMINOPHEN 325 MG/1
650 TABLET ORAL EVERY 4 HOURS PRN
Status: DISCONTINUED | OUTPATIENT
Start: 2017-03-29 | End: 2017-03-31 | Stop reason: HOSPADM

## 2017-03-29 RX ORDER — MAGNESIUM HYDROXIDE/ALUMINUM HYDROXICE/SIMETHICONE 120; 1200; 1200 MG/30ML; MG/30ML; MG/30ML
30 SUSPENSION ORAL EVERY 6 HOURS PRN
Status: DISCONTINUED | OUTPATIENT
Start: 2017-03-29 | End: 2017-03-31 | Stop reason: HOSPADM

## 2017-03-29 RX ORDER — BUTALBITAL, ACETAMINOPHEN AND CAFFEINE 50; 325; 40 MG/1; MG/1; MG/1
1 TABLET ORAL EVERY 6 HOURS PRN
Status: DISCONTINUED | OUTPATIENT
Start: 2017-03-29 | End: 2017-03-31 | Stop reason: HOSPADM

## 2017-03-29 RX ORDER — NITROGLYCERIN 0.4 MG/1
0.4 TABLET SUBLINGUAL
Status: DISCONTINUED | OUTPATIENT
Start: 2017-03-29 | End: 2017-03-29

## 2017-03-29 RX ORDER — DEXTROSE MONOHYDRATE 25 G/50ML
25 INJECTION, SOLUTION INTRAVENOUS
Status: DISCONTINUED | OUTPATIENT
Start: 2017-03-29 | End: 2017-03-31 | Stop reason: HOSPADM

## 2017-03-29 RX ORDER — ONDANSETRON 2 MG/ML
4 INJECTION INTRAMUSCULAR; INTRAVENOUS EVERY 6 HOURS PRN
Status: DISCONTINUED | OUTPATIENT
Start: 2017-03-29 | End: 2017-03-31 | Stop reason: HOSPADM

## 2017-03-29 RX ORDER — SODIUM CHLORIDE 0.9 % (FLUSH) 0.9 %
10 SYRINGE (ML) INJECTION AS NEEDED
Status: DISCONTINUED | OUTPATIENT
Start: 2017-03-29 | End: 2017-03-31 | Stop reason: HOSPADM

## 2017-03-29 RX ORDER — GABAPENTIN 400 MG/1
800 CAPSULE ORAL EVERY 8 HOURS SCHEDULED
Status: DISCONTINUED | OUTPATIENT
Start: 2017-03-29 | End: 2017-03-31 | Stop reason: HOSPADM

## 2017-03-29 RX ORDER — ONDANSETRON 4 MG/1
4 TABLET, FILM COATED ORAL EVERY 6 HOURS PRN
Status: DISCONTINUED | OUTPATIENT
Start: 2017-03-29 | End: 2017-03-31 | Stop reason: HOSPADM

## 2017-03-29 RX ADMIN — NITROGLYCERIN 0.4 MG: 0.4 TABLET SUBLINGUAL at 11:09

## 2017-03-29 RX ADMIN — NITROGLYCERIN 0.4 MG: 0.4 TABLET SUBLINGUAL at 10:51

## 2017-03-29 RX ADMIN — INSULIN DETEMIR 30 UNITS: 100 INJECTION, SOLUTION SUBCUTANEOUS at 21:03

## 2017-03-29 RX ADMIN — ONDANSETRON 4 MG: 2 INJECTION INTRAMUSCULAR; INTRAVENOUS at 12:11

## 2017-03-29 RX ADMIN — GABAPENTIN 800 MG: 400 CAPSULE ORAL at 18:07

## 2017-03-29 RX ADMIN — INSULIN ASPART 2 UNITS: 100 INJECTION, SOLUTION INTRAVENOUS; SUBCUTANEOUS at 18:07

## 2017-03-29 RX ADMIN — ONDANSETRON 4 MG: 2 INJECTION INTRAMUSCULAR; INTRAVENOUS at 20:03

## 2017-03-29 RX ADMIN — FAMOTIDINE 20 MG: 20 TABLET ORAL at 20:02

## 2017-03-29 RX ADMIN — NITROGLYCERIN 10 MCG/MIN: 20 INJECTION INTRAVENOUS at 12:42

## 2017-03-29 RX ADMIN — GABAPENTIN 800 MG: 400 CAPSULE ORAL at 21:03

## 2017-03-29 RX ADMIN — INSULIN ASPART 4 UNITS: 100 INJECTION, SOLUTION INTRAVENOUS; SUBCUTANEOUS at 20:04

## 2017-03-29 RX ADMIN — HYDROCODONE BITARTRATE AND ACETAMINOPHEN 1 TABLET: 7.5; 325 TABLET ORAL at 20:04

## 2017-03-29 RX ADMIN — HUMAN INSULIN 2 UNITS: 100 INJECTION, SOLUTION SUBCUTANEOUS at 13:40

## 2017-03-29 RX ADMIN — ENOXAPARIN SODIUM 130 MG: 150 INJECTION SUBCUTANEOUS at 13:13

## 2017-03-29 RX ADMIN — SODIUM CHLORIDE 500 ML: 9 INJECTION, SOLUTION INTRAVENOUS at 13:30

## 2017-03-29 RX ADMIN — ASPIRIN 81 MG 324 MG: 81 TABLET ORAL at 10:50

## 2017-03-29 RX ADMIN — SODIUM CHLORIDE 125 ML/HR: 900 INJECTION, SOLUTION INTRAVENOUS at 23:47

## 2017-03-29 RX ADMIN — NITROGLYCERIN 0.4 MG: 0.4 TABLET SUBLINGUAL at 10:59

## 2017-03-29 RX ADMIN — HYDROCODONE BITARTRATE AND ACETAMINOPHEN 1 TABLET: 7.5; 325 TABLET ORAL at 15:31

## 2017-03-29 RX ADMIN — HEPARIN SODIUM 5000 UNITS: 5000 INJECTION, SOLUTION INTRAVENOUS; SUBCUTANEOUS at 18:07

## 2017-03-29 RX ADMIN — DOCUSATE SODIUM 200 MG: 100 CAPSULE, LIQUID FILLED ORAL at 20:03

## 2017-03-29 RX ADMIN — HEPARIN SODIUM 5000 UNITS: 5000 INJECTION, SOLUTION INTRAVENOUS; SUBCUTANEOUS at 21:03

## 2017-03-29 RX ADMIN — MORPHINE SULFATE 4 MG: 4 INJECTION, SOLUTION INTRAMUSCULAR; INTRAVENOUS at 12:11

## 2017-03-29 RX ADMIN — SODIUM CHLORIDE 125 ML/HR: 900 INJECTION, SOLUTION INTRAVENOUS at 11:03

## 2017-03-30 PROBLEM — R07.9 CHEST PAIN: Status: ACTIVE | Noted: 2017-03-30

## 2017-03-30 LAB
ALBUMIN SERPL-MCNC: 3.6 G/DL (ref 3.4–4.8)
ALBUMIN/GLOB SERPL: 1.2 G/DL (ref 1.1–1.8)
ALP SERPL-CCNC: 92 U/L (ref 38–126)
ALT SERPL W P-5'-P-CCNC: 43 U/L (ref 21–72)
ANION GAP SERPL CALCULATED.3IONS-SCNC: 11 MMOL/L (ref 5–15)
AST SERPL-CCNC: 21 U/L (ref 17–59)
BASOPHILS # BLD AUTO: 0.04 10*3/MM3 (ref 0–0.2)
BASOPHILS NFR BLD AUTO: 0.5 % (ref 0–2)
BILIRUB SERPL-MCNC: 0.7 MG/DL (ref 0.2–1.3)
BILIRUB UR QL STRIP: NEGATIVE
BUN BLD-MCNC: 11 MG/DL (ref 7–21)
BUN/CREAT SERPL: 15.3 (ref 7–25)
CALCIUM SPEC-SCNC: 8 MG/DL (ref 8.4–10.2)
CHLORIDE SERPL-SCNC: 100 MMOL/L (ref 95–110)
CLARITY UR: CLEAR
CO2 SERPL-SCNC: 24 MMOL/L (ref 22–31)
COLOR UR: YELLOW
CREAT BLD-MCNC: 0.72 MG/DL (ref 0.7–1.3)
DEPRECATED RDW RBC AUTO: 38.7 FL (ref 35.1–43.9)
EOSINOPHIL # BLD AUTO: 0.24 10*3/MM3 (ref 0–0.7)
EOSINOPHIL NFR BLD AUTO: 3.2 % (ref 0–7)
ERYTHROCYTE [DISTWIDTH] IN BLOOD BY AUTOMATED COUNT: 12.8 % (ref 11.5–14.5)
GFR SERPL CREATININE-BSD FRML MDRD: 121 ML/MIN/1.73 (ref 60–147)
GLOBULIN UR ELPH-MCNC: 3 GM/DL (ref 2.3–3.5)
GLUCOSE BLD-MCNC: 199 MG/DL (ref 60–100)
GLUCOSE BLDC GLUCOMTR-MCNC: 203 MG/DL (ref 70–130)
GLUCOSE BLDC GLUCOMTR-MCNC: 203 MG/DL (ref 70–130)
GLUCOSE BLDC GLUCOMTR-MCNC: 236 MG/DL (ref 70–130)
GLUCOSE BLDC GLUCOMTR-MCNC: 242 MG/DL (ref 70–130)
GLUCOSE BLDC GLUCOMTR-MCNC: 306 MG/DL (ref 70–130)
GLUCOSE UR STRIP-MCNC: ABNORMAL MG/DL
HCT VFR BLD AUTO: 39.5 % (ref 39–49)
HGB BLD-MCNC: 14 G/DL (ref 13.7–17.3)
HGB UR QL STRIP.AUTO: NEGATIVE
IMM GRANULOCYTES # BLD: 0.02 10*3/MM3 (ref 0–0.02)
IMM GRANULOCYTES NFR BLD: 0.3 % (ref 0–0.5)
INR PPP: 0.91 (ref 0.8–1.2)
KETONES UR QL STRIP: NEGATIVE
LEUKOCYTE ESTERASE UR QL STRIP.AUTO: NEGATIVE
LYMPHOCYTES # BLD AUTO: 2.44 10*3/MM3 (ref 0.6–4.2)
LYMPHOCYTES NFR BLD AUTO: 32.6 % (ref 10–50)
MAGNESIUM SERPL-MCNC: 1.7 MG/DL (ref 1.6–2.3)
MCH RBC QN AUTO: 29.2 PG (ref 26.5–34)
MCHC RBC AUTO-ENTMCNC: 35.4 G/DL (ref 31.5–36.3)
MCV RBC AUTO: 82.5 FL (ref 80–98)
MONOCYTES # BLD AUTO: 0.55 10*3/MM3 (ref 0–0.9)
MONOCYTES NFR BLD AUTO: 7.3 % (ref 0–12)
NEUTROPHILS # BLD AUTO: 4.2 10*3/MM3 (ref 2–8.6)
NEUTROPHILS NFR BLD AUTO: 56.1 % (ref 37–80)
NITRITE UR QL STRIP: NEGATIVE
PH UR STRIP.AUTO: 5.5 [PH] (ref 5–9)
PLATELET # BLD AUTO: 243 10*3/MM3 (ref 150–450)
PMV BLD AUTO: 9.9 FL (ref 8–12)
POTASSIUM BLD-SCNC: 4.4 MMOL/L (ref 3.5–5.1)
PROT SERPL-MCNC: 6.6 G/DL (ref 6.3–8.6)
PROT UR QL STRIP: NEGATIVE
PROTHROMBIN TIME: 12.2 SECONDS (ref 11.1–15.3)
RBC # BLD AUTO: 4.79 10*6/MM3 (ref 4.37–5.74)
SODIUM BLD-SCNC: 135 MMOL/L (ref 137–145)
SP GR UR STRIP: 1.03 (ref 1–1.03)
UROBILINOGEN UR QL STRIP: ABNORMAL
WBC NRBC COR # BLD: 7.49 10*3/MM3 (ref 3.2–9.8)

## 2017-03-30 PROCEDURE — 63710000001 INSULIN DETEMIR PER 5 UNITS: Performed by: INTERNAL MEDICINE

## 2017-03-30 PROCEDURE — 85610 PROTHROMBIN TIME: CPT | Performed by: HOSPITALIST

## 2017-03-30 PROCEDURE — 25010000002 HEPARIN (PORCINE) PER 1000 UNITS: Performed by: INTERNAL MEDICINE

## 2017-03-30 PROCEDURE — 80053 COMPREHEN METABOLIC PANEL: CPT | Performed by: HOSPITALIST

## 2017-03-30 PROCEDURE — 93010 ELECTROCARDIOGRAM REPORT: CPT | Performed by: INTERNAL MEDICINE

## 2017-03-30 PROCEDURE — 82962 GLUCOSE BLOOD TEST: CPT

## 2017-03-30 PROCEDURE — 81003 URINALYSIS AUTO W/O SCOPE: CPT | Performed by: INTERNAL MEDICINE

## 2017-03-30 PROCEDURE — 85025 COMPLETE CBC W/AUTO DIFF WBC: CPT | Performed by: HOSPITALIST

## 2017-03-30 PROCEDURE — 63710000001 INSULIN ASPART PER 5 UNITS: Performed by: INTERNAL MEDICINE

## 2017-03-30 PROCEDURE — 99254 IP/OBS CNSLTJ NEW/EST MOD 60: CPT | Performed by: INTERNAL MEDICINE

## 2017-03-30 PROCEDURE — 93005 ELECTROCARDIOGRAM TRACING: CPT | Performed by: INTERNAL MEDICINE

## 2017-03-30 PROCEDURE — 83735 ASSAY OF MAGNESIUM: CPT | Performed by: HOSPITALIST

## 2017-03-30 RX ORDER — METOPROLOL TARTRATE 5 MG/5ML
2.5 INJECTION INTRAVENOUS ONCE AS NEEDED
Status: DISCONTINUED | OUTPATIENT
Start: 2017-03-31 | End: 2017-03-31 | Stop reason: HOSPADM

## 2017-03-30 RX ORDER — DIPHENHYDRAMINE HYDROCHLORIDE 50 MG/ML
25 INJECTION INTRAMUSCULAR; INTRAVENOUS ONCE
Status: DISCONTINUED | OUTPATIENT
Start: 2017-03-31 | End: 2017-03-31 | Stop reason: HOSPADM

## 2017-03-30 RX ADMIN — INSULIN ASPART 4 UNITS: 100 INJECTION, SOLUTION INTRAVENOUS; SUBCUTANEOUS at 11:19

## 2017-03-30 RX ADMIN — HEPARIN SODIUM 5000 UNITS: 5000 INJECTION, SOLUTION INTRAVENOUS; SUBCUTANEOUS at 06:21

## 2017-03-30 RX ADMIN — PANTOPRAZOLE SODIUM 40 MG: 40 TABLET, DELAYED RELEASE ORAL at 06:21

## 2017-03-30 RX ADMIN — INSULIN ASPART 2 UNITS: 100 INJECTION, SOLUTION INTRAVENOUS; SUBCUTANEOUS at 09:11

## 2017-03-30 RX ADMIN — INSULIN ASPART 15 UNITS: 100 INJECTION, SOLUTION INTRAVENOUS; SUBCUTANEOUS at 17:07

## 2017-03-30 RX ADMIN — FAMOTIDINE 20 MG: 20 TABLET ORAL at 09:09

## 2017-03-30 RX ADMIN — ASPIRIN 81 MG 81 MG: 81 TABLET ORAL at 09:09

## 2017-03-30 RX ADMIN — SODIUM CHLORIDE 75 ML/HR: 900 INJECTION, SOLUTION INTRAVENOUS at 20:54

## 2017-03-30 RX ADMIN — SERTRALINE HYDROCHLORIDE 100 MG: 50 TABLET ORAL at 09:09

## 2017-03-30 RX ADMIN — INSULIN ASPART 4 UNITS: 100 INJECTION, SOLUTION INTRAVENOUS; SUBCUTANEOUS at 17:06

## 2017-03-30 RX ADMIN — INSULIN ASPART 8 UNITS: 100 INJECTION, SOLUTION INTRAVENOUS; SUBCUTANEOUS at 20:57

## 2017-03-30 RX ADMIN — INSULIN ASPART 15 UNITS: 100 INJECTION, SOLUTION INTRAVENOUS; SUBCUTANEOUS at 09:10

## 2017-03-30 RX ADMIN — SODIUM CHLORIDE 125 ML/HR: 900 INJECTION, SOLUTION INTRAVENOUS at 09:20

## 2017-03-30 RX ADMIN — INSULIN DETEMIR 30 UNITS: 100 INJECTION, SOLUTION SUBCUTANEOUS at 20:48

## 2017-03-30 RX ADMIN — HYDROCODONE BITARTRATE AND ACETAMINOPHEN 1 TABLET: 7.5; 325 TABLET ORAL at 09:09

## 2017-03-30 RX ADMIN — HYDROCODONE BITARTRATE AND ACETAMINOPHEN 1 TABLET: 7.5; 325 TABLET ORAL at 20:46

## 2017-03-30 RX ADMIN — FAMOTIDINE 20 MG: 20 TABLET ORAL at 17:13

## 2017-03-30 RX ADMIN — INSULIN ASPART 15 UNITS: 100 INJECTION, SOLUTION INTRAVENOUS; SUBCUTANEOUS at 11:18

## 2017-03-30 RX ADMIN — DOCUSATE SODIUM 200 MG: 100 CAPSULE, LIQUID FILLED ORAL at 17:13

## 2017-03-30 RX ADMIN — GABAPENTIN 800 MG: 400 CAPSULE ORAL at 21:03

## 2017-03-30 RX ADMIN — GABAPENTIN 800 MG: 400 CAPSULE ORAL at 14:20

## 2017-03-30 RX ADMIN — GABAPENTIN 800 MG: 400 CAPSULE ORAL at 06:21

## 2017-03-30 RX ADMIN — HYDROCODONE BITARTRATE AND ACETAMINOPHEN 1 TABLET: 7.5; 325 TABLET ORAL at 15:23

## 2017-03-31 ENCOUNTER — APPOINTMENT (OUTPATIENT)
Dept: CARDIOLOGY | Facility: HOSPITAL | Age: 42
End: 2017-03-31
Attending: INTERNAL MEDICINE

## 2017-03-31 ENCOUNTER — APPOINTMENT (OUTPATIENT)
Dept: CT IMAGING | Facility: HOSPITAL | Age: 42
End: 2017-03-31

## 2017-03-31 VITALS
WEIGHT: 272 LBS | BODY MASS INDEX: 38.94 KG/M2 | TEMPERATURE: 97.2 F | DIASTOLIC BLOOD PRESSURE: 81 MMHG | HEART RATE: 74 BPM | HEIGHT: 70 IN | OXYGEN SATURATION: 95 % | SYSTOLIC BLOOD PRESSURE: 126 MMHG | RESPIRATION RATE: 18 BRPM

## 2017-03-31 LAB
BH CV ECHO MEAS - % IVS THICK: 29.6 %
BH CV ECHO MEAS - ACS: 2.3 CM
BH CV ECHO MEAS - AO MAX PG (FULL): -0.05 MMHG
BH CV ECHO MEAS - AO MAX PG: 5.1 MMHG
BH CV ECHO MEAS - AO MEAN PG (FULL): 0.89 MMHG
BH CV ECHO MEAS - AO MEAN PG: 3.5 MMHG
BH CV ECHO MEAS - AO ROOT AREA: 9.6 CM^2
BH CV ECHO MEAS - AO ROOT DIAM: 3.5 CM
BH CV ECHO MEAS - AO V2 MAX: 112.7 CM/SEC
BH CV ECHO MEAS - AO V2 MEAN: 88.7 CM/SEC
BH CV ECHO MEAS - AO V2 VTI: 19 CM
BH CV ECHO MEAS - AVA(I,A): 3.5 CM^2
BH CV ECHO MEAS - AVA(I,D): 3.5 CM^2
BH CV ECHO MEAS - AVA(V,A): 3.8 CM^2
BH CV ECHO MEAS - AVA(V,D): 3.8 CM^2
BH CV ECHO MEAS - EDV(CUBED): 81.3 ML
BH CV ECHO MEAS - EDV(TEICH): 84.6 ML
BH CV ECHO MEAS - EF(CUBED): 78.9 %
BH CV ECHO MEAS - EF(TEICH): 71.4 %
BH CV ECHO MEAS - ESV(CUBED): 17.2 ML
BH CV ECHO MEAS - ESV(TEICH): 24.2 ML
BH CV ECHO MEAS - FS: 40.4 %
BH CV ECHO MEAS - IVS/LVPW: 1.1
BH CV ECHO MEAS - IVSD: 1.2 CM
BH CV ECHO MEAS - IVSS: 1.6 CM
BH CV ECHO MEAS - LA DIMENSION: 4 CM
BH CV ECHO MEAS - LA/AO: 1.1
BH CV ECHO MEAS - LV MASS(C)D: 186.5 GRAMS
BH CV ECHO MEAS - LV MAX PG: 5.1 MMHG
BH CV ECHO MEAS - LV MEAN PG: 2.6 MMHG
BH CV ECHO MEAS - LV V1 MAX: 113.2 CM/SEC
BH CV ECHO MEAS - LV V1 MEAN: 72.8 CM/SEC
BH CV ECHO MEAS - LV V1 VTI: 17.7 CM
BH CV ECHO MEAS - LVIDD: 4.3 CM
BH CV ECHO MEAS - LVIDS: 2.6 CM
BH CV ECHO MEAS - LVOT AREA (M): 3.8 CM^2
BH CV ECHO MEAS - LVOT AREA: 3.8 CM^2
BH CV ECHO MEAS - LVOT DIAM: 2.2 CM
BH CV ECHO MEAS - LVPWD: 1.2 CM
BH CV ECHO MEAS - MR MAX PG: 73.5 MMHG
BH CV ECHO MEAS - MR MAX VEL: 423 CM/SEC
BH CV ECHO MEAS - MV A MAX VEL: 60.7 CM/SEC
BH CV ECHO MEAS - MV DEC SLOPE: 375.2 CM/SEC^2
BH CV ECHO MEAS - MV E MAX VEL: 108.8 CM/SEC
BH CV ECHO MEAS - MV E/A: 1.8
BH CV ECHO MEAS - MV MAX PG: 4.1 MMHG
BH CV ECHO MEAS - MV MEAN PG: 1.4 MMHG
BH CV ECHO MEAS - MV P1/2T MAX VEL: 101.8 CM/SEC
BH CV ECHO MEAS - MV P1/2T: 79.5 MSEC
BH CV ECHO MEAS - MV V2 MAX: 101.8 CM/SEC
BH CV ECHO MEAS - MV V2 MEAN: 53.1 CM/SEC
BH CV ECHO MEAS - MV V2 VTI: 29.2 CM
BH CV ECHO MEAS - MVA P1/2T LCG: 2.2 CM^2
BH CV ECHO MEAS - MVA(P1/2T): 2.8 CM^2
BH CV ECHO MEAS - MVA(VTI): 2.3 CM^2
BH CV ECHO MEAS - PA MAX PG: 4.4 MMHG
BH CV ECHO MEAS - PA V2 MAX: 104.5 CM/SEC
BH CV ECHO MEAS - RAP SYSTOLE: 10 MMHG
BH CV ECHO MEAS - RVDD: 2.7 CM
BH CV ECHO MEAS - RVSP: 27.5 MMHG
BH CV ECHO MEAS - SV(AO): 183.5 ML
BH CV ECHO MEAS - SV(CUBED): 64.1 ML
BH CV ECHO MEAS - SV(LVOT): 67.1 ML
BH CV ECHO MEAS - SV(TEICH): 60.4 ML
BH CV ECHO MEAS - TR MAX VEL: 208.6 CM/SEC
GLUCOSE BLDC GLUCOMTR-MCNC: 189 MG/DL (ref 70–130)
GLUCOSE BLDC GLUCOMTR-MCNC: 234 MG/DL (ref 70–130)
GLUCOSE BLDC GLUCOMTR-MCNC: 319 MG/DL (ref 70–130)
LV EF 2D ECHO EST: 55 %

## 2017-03-31 PROCEDURE — 25010000002 ONDANSETRON PER 1 MG: Performed by: INTERNAL MEDICINE

## 2017-03-31 PROCEDURE — 75574 CT ANGIO HRT W/3D IMAGE: CPT

## 2017-03-31 PROCEDURE — 0 IOPAMIDOL PER 1 ML: Performed by: INTERNAL MEDICINE

## 2017-03-31 PROCEDURE — 63710000001 INSULIN ASPART PER 5 UNITS: Performed by: INTERNAL MEDICINE

## 2017-03-31 PROCEDURE — 93306 TTE W/DOPPLER COMPLETE: CPT | Performed by: INTERNAL MEDICINE

## 2017-03-31 PROCEDURE — 25010000002 DIPHENHYDRAMINE PER 50 MG: Performed by: INTERNAL MEDICINE

## 2017-03-31 PROCEDURE — 82962 GLUCOSE BLOOD TEST: CPT

## 2017-03-31 PROCEDURE — 93306 TTE W/DOPPLER COMPLETE: CPT

## 2017-03-31 RX ORDER — DIPHENHYDRAMINE HYDROCHLORIDE 50 MG/ML
25 INJECTION INTRAMUSCULAR; INTRAVENOUS ONCE
Status: COMPLETED | OUTPATIENT
Start: 2017-03-31 | End: 2017-03-31

## 2017-03-31 RX ORDER — ATORVASTATIN CALCIUM 10 MG/1
10 TABLET, FILM COATED ORAL DAILY
Qty: 30 TABLET | Refills: 1 | Status: SHIPPED | OUTPATIENT
Start: 2017-03-31 | End: 2020-08-14 | Stop reason: DRUGHIGH

## 2017-03-31 RX ORDER — ASPIRIN 81 MG/1
81 TABLET, CHEWABLE ORAL DAILY
Qty: 30 TABLET | Refills: 1 | Status: SHIPPED | OUTPATIENT
Start: 2017-03-31 | End: 2018-12-05

## 2017-03-31 RX ADMIN — ASPIRIN 81 MG 81 MG: 81 TABLET ORAL at 09:38

## 2017-03-31 RX ADMIN — GABAPENTIN 800 MG: 400 CAPSULE ORAL at 05:58

## 2017-03-31 RX ADMIN — ONDANSETRON 4 MG: 2 INJECTION INTRAMUSCULAR; INTRAVENOUS at 00:57

## 2017-03-31 RX ADMIN — IOPAMIDOL 90 ML: 755 INJECTION, SOLUTION INTRAVENOUS at 10:00

## 2017-03-31 RX ADMIN — INSULIN ASPART 8 UNITS: 100 INJECTION, SOLUTION INTRAVENOUS; SUBCUTANEOUS at 12:20

## 2017-03-31 RX ADMIN — DIPHENHYDRAMINE HYDROCHLORIDE 25 MG: 50 INJECTION INTRAMUSCULAR; INTRAVENOUS at 08:16

## 2017-03-31 RX ADMIN — HYDROCODONE BITARTRATE AND ACETAMINOPHEN 1 TABLET: 7.5; 325 TABLET ORAL at 09:38

## 2017-03-31 RX ADMIN — INSULIN ASPART 15 UNITS: 100 INJECTION, SOLUTION INTRAVENOUS; SUBCUTANEOUS at 12:20

## 2017-03-31 RX ADMIN — GABAPENTIN 800 MG: 400 CAPSULE ORAL at 13:35

## 2017-03-31 RX ADMIN — FAMOTIDINE 20 MG: 20 TABLET ORAL at 09:38

## 2017-03-31 RX ADMIN — PANTOPRAZOLE SODIUM 40 MG: 40 TABLET, DELAYED RELEASE ORAL at 05:58

## 2017-03-31 RX ADMIN — INSULIN ASPART 15 UNITS: 100 INJECTION, SOLUTION INTRAVENOUS; SUBCUTANEOUS at 10:06

## 2017-03-31 RX ADMIN — SERTRALINE HYDROCHLORIDE 100 MG: 50 TABLET ORAL at 09:38

## 2017-04-03 NOTE — PAYOR COMM NOTE
"Trevor Harkins (41 y.o. Male)     Date of Birth Social Security Number Address Home Phone MRN    1975  [address] [facility] [phone] [id number]    Christian Marital Status          Mormon        Admission Date Admission Type Admitting Provider Attending Provider Department, Room/Bed    3/29/17 Emergency [place], Roland Owusu MD  [facility] STEPDOWN UNIT, 319/1    Discharge Date Discharge Disposition Discharge Destination        3/31/2017 Home or Self Care             Attending Provider: (none)    Allergies:  No Known Allergies    Isolation:  None   Infection:  None   Code Status:  Prior    Ht:  70\" (177.8 cm)   Wt:  272 lb (123 kg)    Admission Cmt:  None   Principal Problem:  Chest pain [R07.9]                 Active Insurance as of 3/29/2017     Primary Coverage     Payor Plan Insurance Group Employer/Plan Group    [employer] PATHWAYS PAR [id number]     Payor Plan Address Payor Plan Phone Number Effective From Effective To    [address] [phone] 1/1/2017     [place] [address]       Subscriber Name Subscriber Birth Date Member ID       TREVOR HARKINS 1975 [id number]                 Emergency Contacts      (Rel.) Home Phone Work Phone Mobile Phone    Verónica Harkins (Spouse) [phone] -- --               Discharge Summary      Librado Clark MD at 3/31/2017  1:19 PM          Discharge Summary  Librado Clark MD  Hospitalist     Date of Discharge:  3/31/2017    Discharge Diagnosis:    Principal Problem:    Chest pain / probably not cardiac in origin    Active Problems:    Diabetes mellitus    Hypertension    Hypercholesterolemia    Obesity    Diabetic neuropathy      Presenting Problem/History of Present Illness    Chest pain    Hospital Course  Patient is a 41 y.o. male presented for chest pain. His ECGs were unremarkable and his Troponin values were negative. Given his risk factors and family history " he underwent a coronary CT angiogram which was within normal limit.    He is advised to control his diabetes, start Aspirin daily.    Procedures Performed      Consults:   Consults     Date and Time Order Name Status Description    3/29/2017 5222 Inpatient Consult to Cardiology Completed     3/29/2017 1242 Hospitalist (on-call MD unless specified)            Pertinent Test Results: radiology: normal coronary CT angiogram / normal LV EF    Condition on Discharge:  stable    Vital Signs  Temp:  [96.4 °F (35.8 °C)-98.4 °F (36.9 °C)] 97.2 °F (36.2 °C)  Heart Rate:  [64-75] 74  Resp:  [18] 18  BP: (118-141)/(73-90) 126/81    Physical Exam:  Physical Exam   Constitutional: He is oriented to person, place, and time. He appears well-developed and well-nourished.   HENT:   Head: Normocephalic and atraumatic.   Eyes: EOM are normal. Pupils are equal, round, and reactive to light. No scleral icterus.   Neck: Normal range of motion. Neck supple. No thyromegaly present.   Cardiovascular: Normal rate and regular rhythm.    Pulmonary/Chest: Effort normal and breath sounds normal.   Abdominal: Soft. Bowel sounds are normal. He exhibits no distension. There is no tenderness.   Musculoskeletal: He exhibits no edema or tenderness.   Neurological: He is alert and oriented to person, place, and time.   Skin: Skin is warm and dry.   Psychiatric: He has a normal mood and affect. His behavior is normal.   Vitals reviewed.      Discharge Disposition  Home or Self Care    Discharge Medications   Trevor Hernandez   Home Medication Instructions RUBIO:[id number]    Printed on:03/31/17 1315   Medication Information                      aspirin 81 MG chewable tablet  Chew 1 tablet Daily.             atorvastatin (LIPITOR) 10 MG tablet  Take 1 tablet by mouth Daily.             butalbital-acetaminophen-caffeine (FIORICET, ESGIC) -40 MG per tablet  Take  tablets by mouth Every 6 (Six) Hours As Needed.             cyclobenzaprine  (FLEXERIL) 10 MG tablet  Take 10 mg by mouth Every 8 (Eight) Hours As Needed.             Dapagliflozin-Metformin HCl ER (XIGDUO XR) 5-1000 MG tablet sustained-release 24 hour  Take 2 tablets by mouth Daily With Breakfast.             Dulaglutide 0.75 MG/0.5ML solution pen-injector  Inject 0.75 mg under the skin 1 (One) Time Per Week. Alternatives,  tanzeum 30 mg weekly,  victoza 1.2 mg daily , bydureon 2mg weekly             gabapentin (NEURONTIN) 800 MG tablet  Take 800 mg by mouth 3 (Three) Times a Day.             HYDROcodone-acetaminophen (NORCO) 7.5-325 MG per tablet  Take 1 tablet by mouth 3 (Three) Times a Day.             Insulin Glargine (TOUJEO SOLOSTAR) 300 UNIT/ML solution pen-injector  Inject 30 units of lipase under the skin Daily.             Insulin Lispro 200 UNIT/ML solution pen-injector  Inject 15 Units under the skin 3 (Three) Times a Day Before Meals.             Insulin Pen Needle (PEN NEEDLES) 31G X 8 MM misc  use as indicated 4 times daily.             losartan-hydrochlorothiazide (HYZAAR) 100-12.5 MG per tablet  Take 1 tablet by mouth Every Morning.             sertraline (ZOLOFT) 100 MG tablet  Take 1 tablet by mouth Daily.                 Discharge Diet:   Diet Instructions     Diet: Regular, Consistent Carbohydrate; Thin Liquids, No Restrictions       Discharge Diet:   Regular  Consistent Carbohydrate      Fluid Consistency:  Thin Liquids, No Restrictions                 Activity at Discharge:   Activity Instructions     Activity as Tolerated                     Follow-up Appointments  Future Appointments  Date Time Provider Department Center   6/13/2017 8:15 AM Ruben Lester MD [facility] None     Additional Instructions for the Follow-ups that You Need to Schedule     Discharge Follow-up with PCP    As directed    Follow Up Details:  1 week                 Test Results Pending at Discharge   Order Current Status    POC Glucose Fingerstick In process    POC Glucose  Fingerstick In process           Librado Clark MD  03/31/17  1:19 PM           Electronically signed by Librado Clark MD at 3/31/2017  8:15 PM

## 2017-04-05 ENCOUNTER — TELEPHONE (OUTPATIENT)
Dept: ENDOCRINOLOGY | Facility: CLINIC | Age: 42
End: 2017-04-05

## 2017-04-07 ENCOUNTER — OFFICE VISIT (OUTPATIENT)
Dept: FAMILY MEDICINE CLINIC | Facility: CLINIC | Age: 42
End: 2017-04-07

## 2017-04-07 VITALS
WEIGHT: 276.4 LBS | OXYGEN SATURATION: 92 % | DIASTOLIC BLOOD PRESSURE: 92 MMHG | BODY MASS INDEX: 39.57 KG/M2 | HEIGHT: 70 IN | TEMPERATURE: 98 F | SYSTOLIC BLOOD PRESSURE: 140 MMHG | HEART RATE: 79 BPM

## 2017-04-07 DIAGNOSIS — R07.9 CHEST PAIN, UNSPECIFIED TYPE: ICD-10-CM

## 2017-04-07 DIAGNOSIS — Z02.5 SPORTS PHYSICAL: Primary | ICD-10-CM

## 2017-04-07 LAB
BILIRUB BLD-MCNC: NEGATIVE MG/DL
CLARITY, POC: CLEAR
COLOR UR: YELLOW
GLUCOSE UR STRIP-MCNC: NEGATIVE MG/DL
KETONES UR QL: NEGATIVE
LEUKOCYTE EST, POC: NEGATIVE
NITRITE UR-MCNC: NEGATIVE MG/ML
PH UR: 6 [PH] (ref 5–8)
PROT UR STRIP-MCNC: NEGATIVE MG/DL
RBC # UR STRIP: ABNORMAL /UL
SP GR UR: 1.01 (ref 1–1.03)
UROBILINOGEN UR QL: NORMAL

## 2017-04-07 PROCEDURE — 81003 URINALYSIS AUTO W/O SCOPE: CPT | Performed by: FAMILY MEDICINE

## 2017-04-07 PROCEDURE — 99214 OFFICE O/P EST MOD 30 MIN: CPT | Performed by: FAMILY MEDICINE

## 2017-04-07 RX ORDER — HYDROCODONE BITARTRATE AND ACETAMINOPHEN 7.5; 325 MG/1; MG/1
1 TABLET ORAL 3 TIMES DAILY
Qty: 90 TABLET | Refills: 0 | Status: SHIPPED | OUTPATIENT
Start: 2017-04-07 | End: 2017-05-04 | Stop reason: SDUPTHER

## 2017-04-07 NOTE — PROGRESS NOTES
Transitional Care Follow Up Visit  Subjective     Trevor Diamond Hernandez is a 41 y.o. male who presents for a transitional care management visit.    Within 48 business hours after discharge our office contacted him via telephone to coordinate his care and needs.      I reviewed and discussed the details of that call along with the discharge summary, hospital problems, inpatient lab results, inpatient diagnostic studies, and consultation reports with Trevor.    No flowsheet data found.    Chest Pain    This is a new problem. The current episode started 1 to 4 weeks ago. The onset quality is sudden. The problem has been resolved. The pain is present in the substernal region. The pain radiates to the left jaw and left neck. The pain is aggravated by nothing. He has tried nothing for the symptoms. Improvement on treatment: Thought help at [facility]. Risk factors include male gender, obesity and sedentary lifestyle.   His past medical history is significant for diabetes. Prior workup: CTA of coronary arteries.      Course During Hospital Stay:  Chest pain, CTA of coronary arteries normal     The following portions of the patient's history were reviewed and updated as appropriate: allergies, current medications, past family history, past medical history, past social history, past surgical history and problem list.    Review of Systems   Cardiovascular: Positive for chest pain.       Objective   Physical Exam   Constitutional:   Morbidly obese   Cardiovascular: Normal rate and regular rhythm.    Pulmonary/Chest: Effort normal and breath sounds normal.   Neurological: He is alert.   Psychiatric: He has a normal mood and affect. His behavior is normal. Thought content normal.   Vitals reviewed.      Assessment/Plan   Trevor was seen today for transitional care management.    Diagnoses and all orders for this visit:    Sports physical  -     POCT urinalysis dipstick, automated    Chest pain, unspecified  type    Need for Tdap vaccination  -     Tdap Vaccine Greater Than or Equal To 8yo IM    Other orders  -     HYDROcodone-acetaminophen (NORCO) 7.5-325 MG per tablet; Take 1 tablet by mouth 3 (Three) Times a Day.      PRATEEK continue diabetic control, weight reduction wellness program

## 2017-05-05 RX ORDER — HYDROCODONE BITARTRATE AND ACETAMINOPHEN 7.5; 325 MG/1; MG/1
1 TABLET ORAL 3 TIMES DAILY
Qty: 90 TABLET | Refills: 0 | Status: SHIPPED | OUTPATIENT
Start: 2017-05-05 | End: 2017-05-24 | Stop reason: SDUPTHER

## 2017-05-24 ENCOUNTER — OFFICE VISIT (OUTPATIENT)
Dept: FAMILY MEDICINE CLINIC | Facility: CLINIC | Age: 42
End: 2017-05-24

## 2017-05-24 VITALS
DIASTOLIC BLOOD PRESSURE: 98 MMHG | BODY MASS INDEX: 39.74 KG/M2 | SYSTOLIC BLOOD PRESSURE: 160 MMHG | OXYGEN SATURATION: 98 % | TEMPERATURE: 98.2 F | WEIGHT: 277.6 LBS | HEIGHT: 70 IN | HEART RATE: 75 BPM

## 2017-05-24 DIAGNOSIS — M54.42 ACUTE LEFT-SIDED LOW BACK PAIN WITH LEFT-SIDED SCIATICA: Primary | ICD-10-CM

## 2017-05-24 PROCEDURE — 96372 THER/PROPH/DIAG INJ SC/IM: CPT | Performed by: FAMILY MEDICINE

## 2017-05-24 PROCEDURE — 99213 OFFICE O/P EST LOW 20 MIN: CPT | Performed by: FAMILY MEDICINE

## 2017-05-24 RX ORDER — PREDNISONE 20 MG/1
TABLET ORAL
Qty: 15 TABLET | Refills: 0 | Status: SHIPPED | OUTPATIENT
Start: 2017-05-24 | End: 2017-09-12

## 2017-05-24 RX ORDER — HYDROCODONE BITARTRATE AND ACETAMINOPHEN 7.5; 325 MG/1; MG/1
1 TABLET ORAL EVERY 4 HOURS PRN
Qty: 120 TABLET | Refills: 0 | Status: SHIPPED | OUTPATIENT
Start: 2017-05-24 | End: 2017-06-07 | Stop reason: SDUPTHER

## 2017-05-24 RX ORDER — AMLODIPINE BESYLATE 5 MG/1
5 TABLET ORAL DAILY
Qty: 90 TABLET | Refills: 3 | Status: SHIPPED | OUTPATIENT
Start: 2017-05-24 | End: 2020-08-19

## 2017-05-24 RX ORDER — METHYLPREDNISOLONE ACETATE 40 MG/ML
40 INJECTION, SUSPENSION INTRA-ARTICULAR; INTRALESIONAL; INTRAMUSCULAR; SOFT TISSUE ONCE
Status: COMPLETED | OUTPATIENT
Start: 2017-05-24 | End: 2017-05-24

## 2017-05-24 RX ORDER — HYDROCODONE BITARTRATE AND ACETAMINOPHEN 7.5; 325 MG/1; MG/1
1 TABLET ORAL 3 TIMES DAILY
Qty: 90 TABLET | Refills: 0 | Status: SHIPPED | OUTPATIENT
Start: 2017-05-24 | End: 2017-05-24

## 2017-05-24 RX ADMIN — METHYLPREDNISOLONE ACETATE 40 MG: 40 INJECTION, SUSPENSION INTRA-ARTICULAR; INTRALESIONAL; INTRAMUSCULAR; SOFT TISSUE at 10:44

## 2017-05-30 ENCOUNTER — TELEPHONE (OUTPATIENT)
Dept: FAMILY MEDICINE CLINIC | Facility: CLINIC | Age: 42
End: 2017-05-30

## 2017-05-30 DIAGNOSIS — M54.42 ACUTE LEFT-SIDED BACK PAIN WITH SCIATICA: Primary | ICD-10-CM

## 2017-06-01 ENCOUNTER — TELEPHONE (OUTPATIENT)
Dept: FAMILY MEDICINE CLINIC | Facility: CLINIC | Age: 42
End: 2017-06-01

## 2017-06-01 DIAGNOSIS — M54.16 BILATERAL LUMBAR RADICULOPATHY: Primary | ICD-10-CM

## 2017-06-01 NOTE — TELEPHONE ENCOUNTER
Dr. Mac's office contacted patient.  Would like for him to have MRI prior to his appointment and would like for you to schedule.  It has been 3 years since Dr. Mac has seen him in the office and cannot schedule MRI prior to his appointment scheduled lumbar MRI without contrast wherever-bilateral radiculopathy

## 2017-06-07 RX ORDER — HYDROCODONE BITARTRATE AND ACETAMINOPHEN 7.5; 325 MG/1; MG/1
1 TABLET ORAL EVERY 4 HOURS PRN
Qty: 120 TABLET | Refills: 0 | Status: SHIPPED | OUTPATIENT
Start: 2017-06-07 | End: 2017-08-16 | Stop reason: SDUPTHER

## 2017-06-26 RX ORDER — GABAPENTIN 600 MG/1
TABLET ORAL
Qty: 180 TABLET | Refills: 2 | Status: SHIPPED | OUTPATIENT
Start: 2017-06-26 | End: 2017-09-25 | Stop reason: SDUPTHER

## 2017-08-11 ENCOUNTER — OFFICE VISIT (OUTPATIENT)
Dept: ENDOCRINOLOGY | Facility: CLINIC | Age: 42
End: 2017-08-11

## 2017-08-11 VITALS
WEIGHT: 280 LBS | HEART RATE: 84 BPM | DIASTOLIC BLOOD PRESSURE: 100 MMHG | SYSTOLIC BLOOD PRESSURE: 160 MMHG | BODY MASS INDEX: 40.09 KG/M2 | HEIGHT: 70 IN

## 2017-08-11 DIAGNOSIS — Z79.4 TYPE 2 DIABETES MELLITUS WITHOUT COMPLICATION, WITH LONG-TERM CURRENT USE OF INSULIN (HCC): Primary | Chronic | ICD-10-CM

## 2017-08-11 DIAGNOSIS — E78.00 HYPERCHOLESTEROLEMIA: Chronic | ICD-10-CM

## 2017-08-11 DIAGNOSIS — I10 ESSENTIAL HYPERTENSION: Chronic | ICD-10-CM

## 2017-08-11 DIAGNOSIS — E11.9 TYPE 2 DIABETES MELLITUS WITHOUT COMPLICATION, WITH LONG-TERM CURRENT USE OF INSULIN (HCC): Primary | Chronic | ICD-10-CM

## 2017-08-11 DIAGNOSIS — E11.42 DIABETIC POLYNEUROPATHY ASSOCIATED WITH TYPE 2 DIABETES MELLITUS (HCC): Chronic | ICD-10-CM

## 2017-08-11 PROCEDURE — 99214 OFFICE O/P EST MOD 30 MIN: CPT | Performed by: NURSE PRACTITIONER

## 2017-08-11 NOTE — PROGRESS NOTES
Subjective    Trevor Hernandez is a 42 y.o. male. he is here for follow up     History of Present Illness        Duration/Timing:  Diabetes mellitus type 2, Age at onset of diabetes: 37 years, Onset of symptoms gradual  timing - constant     quality - not controlled,      severity - moderate    Patient was recently seen at [facility] for preop for back surgery    His CMP showed a blood sugar of 466     A1c was 10.3    Surgery has been put on hold    He is not taking any of his diabetic medications      Severity (Complications/Hospitalizations)  Secondary Macrovascular Complications:  No CAD, No CVA, No PAD  Secondary Microvascular Complications:  No Diabetic Nephropathy, No proteinuria, No Diabetic Retinopathy, Diabetic Neuropathy     Context  Diabetes Regimen:  Oral Medications, Side effects from regimen hypoglcyemia,          HgbA1c 10.3% from August 2017      Blood Glucose Readings    Running 200s      States has not seen any higher than 300    No lows  Diet  variable, high carb  Exercise:  Does not exercise     Associated Signs/Symptoms  Hyperglycemic Symptoms:  No polyuria, No polydipsia, No polyphagia  Hypoglycemic Episodes:  No documented hypoglycemia       The following portions of the patient's history were reviewed and updated as appropriate:   Past Medical History:   Diagnosis Date   • Chronic back pain    • Diabetes mellitus    • Diabetic neuropathy    • Hypercholesterolemia    • Hypercholesterolemia    • Hypertension    • Obesity      Past Surgical History:   Procedure Laterality Date   • BACK SURGERY       Family History   Problem Relation Age of Onset   • Diabetes Other    • Heart disease Other        Current Outpatient Prescriptions   Medication Sig Dispense Refill   • amLODIPine (NORVASC) 5 MG tablet Take 1 tablet by mouth Daily. 90 tablet 3   • aspirin 81 MG chewable tablet Chew 1 tablet Daily. 30 tablet 1   • atorvastatin (LIPITOR) 10 MG tablet Take 1 tablet by mouth Daily. 30 tablet 1   •  butalbital-acetaminophen-caffeine (FIORICET, ESGIC) -40 MG per tablet Take  tablets by mouth Every 6 (Six) Hours As Needed.     • cyclobenzaprine (FLEXERIL) 10 MG tablet Take 10 mg by mouth Every 8 (Eight) Hours As Needed.     • Dapagliflozin-Metformin HCl ER (XIGDUO XR) 5-1000 MG tablet sustained-release 24 hour Take 2 tablets by mouth Daily With Breakfast. 60 tablet 11   • Dulaglutide 0.75 MG/0.5ML solution pen-injector Inject 0.75 mg under the skin 1 (One) Time Per Week. Alternatives,  tanzeum 30 mg weekly,  victoza 1.2 mg daily , bydureon 2mg weekly 4 pen 11   • gabapentin (NEURONTIN) 600 MG tablet TAKE 2 TABLETS THREE TIMES DAILY 180 tablet 2   • gabapentin (NEURONTIN) 800 MG tablet Take 800 mg by mouth 3 (Three) Times a Day.     • HYDROcodone-acetaminophen (NORCO) 7.5-325 MG per tablet Take 1 tablet by mouth Every 4 (Four) Hours As Needed for Moderate Pain (4-6). 120 tablet 0   • Insulin Glargine (TOUJEO SOLOSTAR) 300 UNIT/ML solution pen-injector Inject 30 units of lipase under the skin Daily. 2 pen 11   • Insulin Lispro 200 UNIT/ML solution pen-injector Inject 15 Units under the skin 3 (Three) Times a Day Before Meals. 3 pen 11   • Insulin Pen Needle (PEN NEEDLES) 31G X 8 MM misc use as indicated 4 times daily. 120 each 11   • losartan-hydrochlorothiazide (HYZAAR) 100-12.5 MG per tablet Take 1 tablet by mouth Every Morning.     • predniSONE (DELTASONE) 20 MG tablet Take 1 pill a day for 5 days starting Thursday morning 15 tablet 0   • sertraline (ZOLOFT) 100 MG tablet Take 1 tablet by mouth Daily. 90 tablet 2     No current facility-administered medications for this visit.      No Known Allergies  Social History     Social History   • Marital status:      Spouse name: N/A   • Number of children: N/A   • Years of education: N/A     Social History Main Topics   • Smoking status: Never Smoker   • Smokeless tobacco: Current User     Types: Chew   • Alcohol use No   • Drug use: No   • Sexual  "activity: Not Asked     Other Topics Concern   • None     Social History Narrative       Review of Systems  Review of Systems   Constitutional: Negative for activity change, appetite change, diaphoresis and fatigue.   HENT: Negative for facial swelling, sneezing, sore throat, tinnitus, trouble swallowing and voice change.    Eyes: Negative for photophobia, pain, discharge, redness, itching and visual disturbance.   Respiratory: Negative for apnea, cough, choking, chest tightness and shortness of breath.    Cardiovascular: Negative for chest pain, palpitations and leg swelling.   Gastrointestinal: Negative for abdominal distention, abdominal pain, constipation, diarrhea, nausea and vomiting.   Endocrine: Negative for cold intolerance, heat intolerance, polydipsia, polyphagia and polyuria.   Genitourinary: Negative for difficulty urinating, dysuria, frequency, hematuria and urgency.   Musculoskeletal: Positive for back pain and gait problem. Negative for arthralgias, joint swelling, myalgias, neck pain and neck stiffness.   Skin: Negative for color change, pallor, rash and wound.   Neurological: Negative for dizziness, tremors, weakness, light-headedness, numbness and headaches.   Hematological: Negative for adenopathy. Does not bruise/bleed easily.   Psychiatric/Behavioral: Negative for behavioral problems, confusion and sleep disturbance.        Objective    /100 (BP Location: Left arm, Patient Position: Sitting, Cuff Size: Adult)  Pulse 84  Ht 70\" (177.8 cm)  Wt 280 lb (127 kg)  BMI 40.18 kg/m2  Physical Exam   Constitutional: He is oriented to person, place, and time. He appears well-developed and well-nourished. He appears distressed.   HENT:   Head: Normocephalic and atraumatic.   Right Ear: External ear normal.   Left Ear: External ear normal.   Nose: Nose normal.   Eyes: Conjunctivae and EOM are normal. Pupils are equal, round, and reactive to light.   Neck: Normal range of motion. Neck supple. No " tracheal deviation present. No thyromegaly present.   Cardiovascular: Normal rate, regular rhythm and normal heart sounds.    No murmur heard.  Pulmonary/Chest: Effort normal and breath sounds normal. No respiratory distress. He has no wheezes.   Abdominal: Soft. Bowel sounds are normal. There is no tenderness. There is no rebound and no guarding.   Musculoskeletal: Normal range of motion. He exhibits no edema, tenderness or deformity.   Neurological: He is alert and oriented to person, place, and time. No cranial nerve deficit.   Skin: Skin is warm and dry. No rash noted.   Psychiatric: He has a normal mood and affect. His behavior is normal. Judgment and thought content normal.       Lab Review  Glucose (mg/dL)   Date Value   03/30/2017 199 (H)   03/29/2017 255 (H)   02/10/2017 195 (H)     Sodium (mmol/L)   Date Value   03/30/2017 135 (L)   03/29/2017 135 (L)   02/10/2017 136 (L)     Potassium (mmol/L)   Date Value   03/30/2017 4.4   03/29/2017 4.3   02/10/2017 4.2     Chloride (mmol/L)   Date Value   03/30/2017 100   03/29/2017 98   02/10/2017 99     CO2 (mmol/L)   Date Value   03/30/2017 24.0   03/29/2017 23.0   02/10/2017 25.0     BUN (mg/dL)   Date Value   03/30/2017 11   03/29/2017 10   02/10/2017 12     Creatinine (mg/dL)   Date Value   03/30/2017 0.72   03/29/2017 0.65 (L)   02/10/2017 0.64 (L)     Hemoglobin A1C (%)   Date Value   03/29/2017 9.87 (H)   02/10/2017 9.62 (H)     Triglycerides (mg/dL)   Date Value   02/10/2017 503 (H)       Assessment/Plan      1. Type 2 diabetes mellitus without complication, with long-term current use of insulin    2. Essential hypertension    3. Hypercholesterolemia    4. Diabetic polyneuropathy associated with type 2 diabetes mellitus    .    Medications prescribed:  Outpatient Encounter Prescriptions as of 8/11/2017   Medication Sig Dispense Refill   • amLODIPine (NORVASC) 5 MG tablet Take 1 tablet by mouth Daily. 90 tablet 3   • aspirin 81 MG chewable tablet Chew 1  tablet Daily. 30 tablet 1   • atorvastatin (LIPITOR) 10 MG tablet Take 1 tablet by mouth Daily. 30 tablet 1   • butalbital-acetaminophen-caffeine (FIORICET, ESGIC) -40 MG per tablet Take  tablets by mouth Every 6 (Six) Hours As Needed.     • cyclobenzaprine (FLEXERIL) 10 MG tablet Take 10 mg by mouth Every 8 (Eight) Hours As Needed.     • Dapagliflozin-Metformin HCl ER (XIGDUO XR) 5-1000 MG tablet sustained-release 24 hour Take 2 tablets by mouth Daily With Breakfast. 60 tablet 11   • Dulaglutide 0.75 MG/0.5ML solution pen-injector Inject 0.75 mg under the skin 1 (One) Time Per Week. Alternatives,  tanzeum 30 mg weekly,  victoza 1.2 mg daily , bydureon 2mg weekly 4 pen 11   • gabapentin (NEURONTIN) 600 MG tablet TAKE 2 TABLETS THREE TIMES DAILY 180 tablet 2   • gabapentin (NEURONTIN) 800 MG tablet Take 800 mg by mouth 3 (Three) Times a Day.     • HYDROcodone-acetaminophen (NORCO) 7.5-325 MG per tablet Take 1 tablet by mouth Every 4 (Four) Hours As Needed for Moderate Pain (4-6). 120 tablet 0   • Insulin Glargine (TOUJEO SOLOSTAR) 300 UNIT/ML solution pen-injector Inject 30 units of lipase under the skin Daily. 2 pen 11   • Insulin Lispro 200 UNIT/ML solution pen-injector Inject 15 Units under the skin 3 (Three) Times a Day Before Meals. 3 pen 11   • Insulin Pen Needle (PEN NEEDLES) 31G X 8 MM misc use as indicated 4 times daily. 120 each 11   • losartan-hydrochlorothiazide (HYZAAR) 100-12.5 MG per tablet Take 1 tablet by mouth Every Morning.     • predniSONE (DELTASONE) 20 MG tablet Take 1 pill a day for 5 days starting Thursday morning 15 tablet 0   • sertraline (ZOLOFT) 100 MG tablet Take 1 tablet by mouth Daily. 90 tablet 2     No facility-administered encounter medications on file as of 8/11/2017.        Orders placed during this encounter include:  No orders of the defined types were placed in this encounter.      Glycemic management       on xigduo xr full dose - not taking      tanzeum 50 mg  weekly--- not taking      fasting not a problem     biggest meal is supper     Add toujeo 10 units with supper , increase by 2 units every 3 days until fasting less than 130      restart apridra or novolog or humalog for supper and big meals , do by experience , start with 5 units to 20       Restart Toujeo -- 30 units at night    If wakes up less than 80 decrease by 5 units       Restart Apidra    2 units per 15 grams of CHO    + scale 2 per 50 above 150    Return in 3 days       Lipid Management        Lab Results   Component Value Date     CHOL 182 02/10/2017            Lab Results   Component Value Date     TRIG 503 (H) 02/10/2017            Lab Results   Component Value Date     HDL 35 (L) 02/10/2017      LDL 77     ASCVD was 10 y 3.6%     reevaluate     no statin but weight reduction  Blood Pressure Management  controlled on lisinopril   Microvascular Complication Monitoring:  No Microalbuminuria, Date of last Microalbumin Assessment 07/22/2016, No Diabetic Retinopathy, Diabetic Neuropathy  eye exam scheduled     for neuropathy - neurontin  1200 tid   component of back pain , has had surgery      start lyrica 75 mg bid , too expensive and similar efficacy      suggested anodyne therapy - not working   Immunizations:  Last influenza immunization 2015, Last pneumococcal immunization 2015  Preventive Care:  Patient is not smoking  Weight Related:  Obesity, Counseled on nutrition, Counseled on physical activity  sleep study suggested     he prefers to wait  [facility]  July 2016     vit D 26   Other Diabetes Related Aspects           Lab Results   Component Value Date     TSH 0.940 02/10/2017            Lab Results   Component Value Date     [id number] 718 02/10/2017          4. Follow-up: Return in about 3 days (around 8/14/2017) for Recheck.

## 2017-08-15 ENCOUNTER — OFFICE VISIT (OUTPATIENT)
Dept: ENDOCRINOLOGY | Facility: CLINIC | Age: 42
End: 2017-08-15

## 2017-08-15 VITALS
HEIGHT: 70 IN | BODY MASS INDEX: 40.09 KG/M2 | SYSTOLIC BLOOD PRESSURE: 138 MMHG | DIASTOLIC BLOOD PRESSURE: 90 MMHG | WEIGHT: 280 LBS | HEART RATE: 81 BPM

## 2017-08-15 DIAGNOSIS — E11.9 TYPE 2 DIABETES MELLITUS WITHOUT COMPLICATION, WITH LONG-TERM CURRENT USE OF INSULIN (HCC): Primary | Chronic | ICD-10-CM

## 2017-08-15 DIAGNOSIS — E78.00 HYPERCHOLESTEROLEMIA: Chronic | ICD-10-CM

## 2017-08-15 DIAGNOSIS — E11.42 DIABETIC POLYNEUROPATHY ASSOCIATED WITH TYPE 2 DIABETES MELLITUS (HCC): Chronic | ICD-10-CM

## 2017-08-15 DIAGNOSIS — I10 ESSENTIAL HYPERTENSION: Chronic | ICD-10-CM

## 2017-08-15 DIAGNOSIS — Z79.4 TYPE 2 DIABETES MELLITUS WITHOUT COMPLICATION, WITH LONG-TERM CURRENT USE OF INSULIN (HCC): Primary | Chronic | ICD-10-CM

## 2017-08-15 PROCEDURE — 99214 OFFICE O/P EST MOD 30 MIN: CPT | Performed by: NURSE PRACTITIONER

## 2017-08-15 NOTE — PROGRESS NOTES
Subjective    Trevor Hernandez is a 42 y.o. male. he is here for follow up     History of Present Illness        Duration/Timing:  Diabetes mellitus type 2, Age at onset of diabetes: 37 years, Onset of symptoms gradual  timing - constant     quality - not controlled,      severity - moderate     Patient was recently seen at [facility] for preop for back surgery     His CMP showed a blood sugar of 466      A1c was 10.3     Surgery has been put on hold     He is not taking any of his diabetic medications      Severity (Complications/Hospitalizations)  Secondary Macrovascular Complications:  No CAD, No CVA, No PAD  Secondary Microvascular Complications:  No Diabetic Nephropathy, No proteinuria, No Diabetic Retinopathy, Diabetic Neuropathy     Context  Diabetes Regimen:  Oral Medications, Side effects from regimen hypoglcyemia,            HgbA1c 10.3% from August 2017      Blood Glucose Readings     Running 200s am fasting               No lows  Diet  variable, high carb  Exercise:  Does not exercise     Associated Signs/Symptoms  Hyperglycemic Symptoms:  No polyuria, No polydipsia, No polyphagia  Hypoglycemic Episodes:  No documented hypoglycemia              The following portions of the patient's history were reviewed and updated as appropriate:   Past Medical History:   Diagnosis Date   • Chronic back pain    • Diabetes mellitus    • Diabetic neuropathy    • Hypercholesterolemia    • Hypercholesterolemia    • Hypertension    • Obesity      Past Surgical History:   Procedure Laterality Date   • BACK SURGERY       Family History   Problem Relation Age of Onset   • Diabetes Other    • Heart disease Other        Current Outpatient Prescriptions   Medication Sig Dispense Refill   • amLODIPine (NORVASC) 5 MG tablet Take 1 tablet by mouth Daily. 90 tablet 3   • aspirin 81 MG chewable tablet Chew 1 tablet Daily. 30 tablet 1   • atorvastatin (LIPITOR) 10 MG tablet Take 1 tablet by mouth Daily. 30 tablet 1   •  butalbital-acetaminophen-caffeine (FIORICET, ESGIC) -40 MG per tablet Take  tablets by mouth Every 6 (Six) Hours As Needed.     • cyclobenzaprine (FLEXERIL) 10 MG tablet Take 10 mg by mouth Every 8 (Eight) Hours As Needed.     • Dapagliflozin-Metformin HCl ER (XIGDUO XR) 5-1000 MG tablet sustained-release 24 hour Take 2 tablets by mouth Daily With Breakfast. 60 tablet 11   • Dulaglutide 0.75 MG/0.5ML solution pen-injector Inject 0.75 mg under the skin 1 (One) Time Per Week. Alternatives,  tanzeum 30 mg weekly,  victoza 1.2 mg daily , bydureon 2mg weekly 4 pen 11   • gabapentin (NEURONTIN) 600 MG tablet TAKE 2 TABLETS THREE TIMES DAILY 180 tablet 2   • gabapentin (NEURONTIN) 800 MG tablet Take 800 mg by mouth 3 (Three) Times a Day.     • HYDROcodone-acetaminophen (NORCO) 7.5-325 MG per tablet Take 1 tablet by mouth Every 4 (Four) Hours As Needed for Moderate Pain (4-6). 120 tablet 0   • Insulin Glargine (TOUJEO SOLOSTAR) 300 UNIT/ML solution pen-injector Inject 30 units of lipase under the skin Daily. 2 pen 11   • Insulin Lispro 200 UNIT/ML solution pen-injector Inject 15 Units under the skin 3 (Three) Times a Day Before Meals. 3 pen 11   • Insulin Pen Needle (PEN NEEDLES) 31G X 8 MM misc use as indicated 4 times daily. 120 each 11   • losartan-hydrochlorothiazide (HYZAAR) 100-12.5 MG per tablet Take 1 tablet by mouth Every Morning.     • predniSONE (DELTASONE) 20 MG tablet Take 1 pill a day for 5 days starting Thursday morning 15 tablet 0   • sertraline (ZOLOFT) 100 MG tablet Take 1 tablet by mouth Daily. 90 tablet 2     No current facility-administered medications for this visit.      No Known Allergies  Social History     Social History   • Marital status:      Spouse name: N/A   • Number of children: N/A   • Years of education: N/A     Social History Main Topics   • Smoking status: Never Smoker   • Smokeless tobacco: Current User     Types: Chew   • Alcohol use No   • Drug use: No   • Sexual  "activity: Not Asked     Other Topics Concern   • None     Social History Narrative       Review of Systems  Review of Systems   Constitutional: Negative for activity change, appetite change, diaphoresis and fatigue.   HENT: Negative for congestion, dental problem, drooling, facial swelling, sneezing, sore throat, tinnitus, trouble swallowing and voice change.    Eyes: Negative for photophobia, pain, discharge, redness, itching and visual disturbance.   Respiratory: Negative for apnea, cough, choking, chest tightness and shortness of breath.    Cardiovascular: Negative for chest pain, palpitations and leg swelling.   Gastrointestinal: Negative for abdominal distention, abdominal pain, constipation, diarrhea, nausea and vomiting.   Endocrine: Negative for cold intolerance, heat intolerance, polydipsia, polyphagia and polyuria.   Genitourinary: Negative for difficulty urinating, dysuria, frequency, hematuria and urgency.   Musculoskeletal: Negative for arthralgias, back pain, gait problem, joint swelling, myalgias, neck pain and neck stiffness.   Skin: Negative for color change, pallor, rash and wound.   Allergic/Immunologic: Negative for environmental allergies, food allergies and immunocompromised state.   Neurological: Negative for dizziness, tremors, facial asymmetry, weakness, light-headedness, numbness and headaches.   Hematological: Negative for adenopathy. Does not bruise/bleed easily.   Psychiatric/Behavioral: Negative for agitation, behavioral problems, confusion, decreased concentration and sleep disturbance.        Objective    /90 (BP Location: Right arm, Patient Position: Sitting, Cuff Size: Adult)  Pulse 81  Ht 70\" (177.8 cm)  Wt 280 lb (127 kg)  BMI 40.18 kg/m2  Physical Exam   Constitutional: He is oriented to person, place, and time. He appears well-developed and well-nourished. No distress.   HENT:   Head: Normocephalic and atraumatic.   Right Ear: External ear normal.   Left Ear: External " ear normal.   Nose: Nose normal.   Eyes: Conjunctivae and EOM are normal. Pupils are equal, round, and reactive to light.   Neck: Normal range of motion. Neck supple. No tracheal deviation present. No thyromegaly present.   Cardiovascular: Normal rate, regular rhythm and normal heart sounds.    No murmur heard.  Pulmonary/Chest: Effort normal and breath sounds normal. No respiratory distress. He has no wheezes.   Abdominal: Soft. Bowel sounds are normal. There is no tenderness. There is no rebound and no guarding.   Musculoskeletal: Normal range of motion. He exhibits no edema, tenderness or deformity.   Neurological: He is alert and oriented to person, place, and time. No cranial nerve deficit.   Skin: Skin is warm and dry. No rash noted.   Psychiatric: He has a normal mood and affect. His behavior is normal. Judgment and thought content normal.       Lab Review  Glucose (mg/dL)   Date Value   03/30/2017 199 (H)   03/29/2017 255 (H)   02/10/2017 195 (H)     Sodium (mmol/L)   Date Value   03/30/2017 135 (L)   03/29/2017 135 (L)   02/10/2017 136 (L)     Potassium (mmol/L)   Date Value   03/30/2017 4.4   03/29/2017 4.3   02/10/2017 4.2     Chloride (mmol/L)   Date Value   03/30/2017 100   03/29/2017 98   02/10/2017 99     CO2 (mmol/L)   Date Value   03/30/2017 24.0   03/29/2017 23.0   02/10/2017 25.0     BUN (mg/dL)   Date Value   03/30/2017 11   03/29/2017 10   02/10/2017 12     Creatinine (mg/dL)   Date Value   03/30/2017 0.72   03/29/2017 0.65 (L)   02/10/2017 0.64 (L)     Hemoglobin A1C (%)   Date Value   03/29/2017 9.87 (H)   02/10/2017 9.62 (H)     Triglycerides (mg/dL)   Date Value   02/10/2017 503 (H)       Assessment/Plan      1. Type 2 diabetes mellitus without complication, with long-term current use of insulin    2. Essential hypertension    3. Diabetic polyneuropathy associated with type 2 diabetes mellitus    4. Hypercholesterolemia    .    Medications prescribed:  Outpatient Encounter Prescriptions as  of 8/15/2017   Medication Sig Dispense Refill   • amLODIPine (NORVASC) 5 MG tablet Take 1 tablet by mouth Daily. 90 tablet 3   • aspirin 81 MG chewable tablet Chew 1 tablet Daily. 30 tablet 1   • atorvastatin (LIPITOR) 10 MG tablet Take 1 tablet by mouth Daily. 30 tablet 1   • butalbital-acetaminophen-caffeine (FIORICET, ESGIC) -40 MG per tablet Take  tablets by mouth Every 6 (Six) Hours As Needed.     • cyclobenzaprine (FLEXERIL) 10 MG tablet Take 10 mg by mouth Every 8 (Eight) Hours As Needed.     • Dapagliflozin-Metformin HCl ER (XIGDUO XR) 5-1000 MG tablet sustained-release 24 hour Take 2 tablets by mouth Daily With Breakfast. 60 tablet 11   • Dulaglutide 0.75 MG/0.5ML solution pen-injector Inject 0.75 mg under the skin 1 (One) Time Per Week. Alternatives,  tanzeum 30 mg weekly,  victoza 1.2 mg daily , bydureon 2mg weekly 4 pen 11   • gabapentin (NEURONTIN) 600 MG tablet TAKE 2 TABLETS THREE TIMES DAILY 180 tablet 2   • gabapentin (NEURONTIN) 800 MG tablet Take 800 mg by mouth 3 (Three) Times a Day.     • HYDROcodone-acetaminophen (NORCO) 7.5-325 MG per tablet Take 1 tablet by mouth Every 4 (Four) Hours As Needed for Moderate Pain (4-6). 120 tablet 0   • Insulin Glargine (TOUJEO SOLOSTAR) 300 UNIT/ML solution pen-injector Inject 30 units of lipase under the skin Daily. 2 pen 11   • Insulin Lispro 200 UNIT/ML solution pen-injector Inject 15 Units under the skin 3 (Three) Times a Day Before Meals. 3 pen 11   • Insulin Pen Needle (PEN NEEDLES) 31G X 8 MM misc use as indicated 4 times daily. 120 each 11   • losartan-hydrochlorothiazide (HYZAAR) 100-12.5 MG per tablet Take 1 tablet by mouth Every Morning.     • predniSONE (DELTASONE) 20 MG tablet Take 1 pill a day for 5 days starting Thursday morning 15 tablet 0   • sertraline (ZOLOFT) 100 MG tablet Take 1 tablet by mouth Daily. 90 tablet 2     No facility-administered encounter medications on file as of 8/15/2017.        Orders placed during this  encounter include:  No orders of the defined types were placed in this encounter.    Glycemic management         on xigduo xr full dose - not taking       tanzeum 50 mg weekly--- not taking              Restart Toujeo -- 30 units at night-- increase to 45 units      If wakes up less than 80 decrease by 5 units         Restart Apidra     2 units per 15 grams of CHO - 3 units per 15 grams of CHO     + scale 2 per 50 above 150               Lipid Management            Lab Results   Component Value Date     CHOL 182 02/10/2017                Lab Results   Component Value Date     TRIG 503 (H) 02/10/2017                Lab Results   Component Value Date     HDL 35 (L) 02/10/2017      LDL 77     ASCVD was 10 y 3.6%     reevaluate     no statin but weight reduction  Blood Pressure Management  controlled on lisinopril   Microvascular Complication Monitoring:  No Microalbuminuria, Date of last Microalbumin Assessment 07/22/2016, No Diabetic Retinopathy, Diabetic Neuropathy  eye exam scheduled     for neuropathy - neurontin  1200 tid   component of back pain , has had surgery      start lyrica 75 mg bid , too expensive and similar efficacy      suggested anodyne therapy - not working   Immunizations:  Last influenza immunization 2015, Last pneumococcal immunization 2015  Preventive Care:  Patient is not smoking  Weight Related:  Obesity, Counseled on nutrition, Counseled on physical activity  sleep study suggested     he prefers to wait  [facility]  July 2016     vit D 26   Other Diabetes Related Aspects               Lab Results   Component Value Date     TSH 0.940 02/10/2017                Lab Results   Component Value Date     [id number] 718 02/10/2017               4. Follow-up: No Follow-up on file.

## 2017-08-15 NOTE — TELEPHONE ENCOUNTER
Patient had appointment with Dr. Mac.  He had his pre-op and surgery was scheduled for 8/17/17.  On Friday received a call from doctor's office that surgery had been cancelled due to his insurance.  They [place] not [place] out of state.  They have sent all of this records to Dr. Chowdhury at [facility].  He has not heard anything since Friday.  In severe pain.  Not sure what he needs to do.  Would like for Dr. Deng to call once he returns to the office.

## 2017-08-16 RX ORDER — HYDROCODONE BITARTRATE AND ACETAMINOPHEN 7.5; 325 MG/1; MG/1
1 TABLET ORAL EVERY 4 HOURS PRN
Qty: 120 TABLET | Refills: 0 | Status: SHIPPED | OUTPATIENT
Start: 2017-08-16 | End: 2017-09-12 | Stop reason: SDUPTHER

## 2017-08-23 ENCOUNTER — TELEPHONE (OUTPATIENT)
Dept: FAMILY MEDICINE CLINIC | Facility: CLINIC | Age: 42
End: 2017-08-23

## 2017-08-23 DIAGNOSIS — M51.36 DDD (DEGENERATIVE DISC DISEASE), LUMBAR: Primary | ICD-10-CM

## 2017-08-23 NOTE — TELEPHONE ENCOUNTER
PT'S SPOUSE HAS CALLED & WANTS A REFERRAL TO [facility]GEON  IN [place].    DC MARQUEZ MD    FAX #  [phone] referred to Dc Marquez-degenerative disc disease of spine with radiculopathy

## 2017-09-12 ENCOUNTER — OFFICE VISIT (OUTPATIENT)
Dept: FAMILY MEDICINE CLINIC | Facility: CLINIC | Age: 42
End: 2017-09-12

## 2017-09-12 VITALS
OXYGEN SATURATION: 95 % | WEIGHT: 272 LBS | TEMPERATURE: 98.1 F | HEIGHT: 70 IN | SYSTOLIC BLOOD PRESSURE: 144 MMHG | BODY MASS INDEX: 38.94 KG/M2 | HEART RATE: 87 BPM | DIASTOLIC BLOOD PRESSURE: 78 MMHG

## 2017-09-12 DIAGNOSIS — G89.29 CHRONIC LEFT-SIDED LOW BACK PAIN WITH LEFT-SIDED SCIATICA: Primary | ICD-10-CM

## 2017-09-12 DIAGNOSIS — M54.42 CHRONIC LEFT-SIDED LOW BACK PAIN WITH LEFT-SIDED SCIATICA: Primary | ICD-10-CM

## 2017-09-12 PROCEDURE — 96372 THER/PROPH/DIAG INJ SC/IM: CPT | Performed by: FAMILY MEDICINE

## 2017-09-12 PROCEDURE — 99213 OFFICE O/P EST LOW 20 MIN: CPT | Performed by: FAMILY MEDICINE

## 2017-09-12 RX ORDER — HYDROCODONE BITARTRATE AND ACETAMINOPHEN 7.5; 325 MG/1; MG/1
1 TABLET ORAL EVERY 4 HOURS PRN
Qty: 120 TABLET | Refills: 0 | Status: SHIPPED | OUTPATIENT
Start: 2017-09-12 | End: 2017-11-30 | Stop reason: HOSPADM

## 2017-09-12 RX ORDER — METHYLPREDNISOLONE ACETATE 40 MG/ML
40 INJECTION, SUSPENSION INTRA-ARTICULAR; INTRALESIONAL; INTRAMUSCULAR; SOFT TISSUE ONCE
Status: COMPLETED | OUTPATIENT
Start: 2017-09-12 | End: 2017-09-12

## 2017-09-12 RX ADMIN — METHYLPREDNISOLONE ACETATE 40 MG: 40 INJECTION, SUSPENSION INTRA-ARTICULAR; INTRALESIONAL; INTRAMUSCULAR; SOFT TISSUE at 13:30

## 2017-09-12 NOTE — PROGRESS NOTES
Subjective   Trevor Hernandez is a 42 y.o. male.     Back Pain   This is a recurrent problem. The current episode started more than 1 month ago. The problem occurs constantly. The pain is present in the sacro-iliac. The quality of the pain is described as burning and cramping. The pain radiates to the left thigh and left knee. The pain is at a severity of 5/10. The pain is moderate. The pain is the same all the time. The symptoms are aggravated by bending and position. Stiffness is present in the morning. Pertinent negatives include no bladder incontinence or bowel incontinence. Risk factors include obesity, poor posture and sedentary lifestyle. He has tried analgesics for the symptoms.       The following portions of the patient's history were reviewed and updated as appropriate: allergies, current medications, past family history, past medical history, past social history, past surgical history and problem list.    Review of Systems   Gastrointestinal: Negative for bowel incontinence.   Genitourinary: Negative for bladder incontinence.   Musculoskeletal: Positive for back pain.   Neurological:        Lumbar radiculopathy on left-was scheduled for fusion-anterior approach etc. and insurance stopped it because it was [place]       Objective   Physical Exam   Constitutional: He is oriented to person, place, and time.   Morbidly obese   Musculoskeletal: He exhibits no edema.   Paravertebral muscle spasm   Neurological: He is alert and oriented to person, place, and time. He has normal reflexes. He displays normal reflexes.   Psychiatric: He has a normal mood and affect. His behavior is normal. Thought content normal.   Nursing note and vitals reviewed.      Assessment/Plan   Trevor was seen today for back pain.    Diagnoses and all orders for this visit:    Chronic left-sided low back pain with left-sided sciatica  -     methylPREDNISolone acetate (DEPO-medrol) injection 40 mg; Inject 1 mL into the  shoulder, thigh, or buttocks 1 (One) Time.    Other orders  -     HYDROcodone-acetaminophen (NORCO) 7.5-325 MG per tablet; Take 1 tablet by mouth Every 4 (Four) Hours As Needed for Moderate Pain .     [place] in [place] neuro[place], if loses function to call, advised steroids will make diabetes worse

## 2017-09-25 ENCOUNTER — TELEPHONE (OUTPATIENT)
Dept: FAMILY MEDICINE CLINIC | Facility: CLINIC | Age: 42
End: 2017-09-25

## 2017-09-25 RX ORDER — GABAPENTIN 600 MG/1
TABLET ORAL
Qty: 180 TABLET | Refills: 2 | OUTPATIENT
Start: 2017-09-25

## 2017-09-25 RX ORDER — GABAPENTIN 600 MG/1
TABLET ORAL
Qty: 180 TABLET | Refills: 2 | Status: SHIPPED | OUTPATIENT
Start: 2017-09-25 | End: 2018-12-05

## 2017-10-10 ENCOUNTER — HOSPITAL ENCOUNTER (OUTPATIENT)
Facility: HOSPITAL | Age: 42
Setting detail: SURGERY ADMIT
End: 2017-10-10
Attending: ORTHOPAEDIC SURGERY | Admitting: ORTHOPAEDIC SURGERY

## 2017-10-13 ENCOUNTER — TELEPHONE (OUTPATIENT)
Dept: VASCULAR SURGERY | Facility: CLINIC | Age: 42
End: 2017-10-13

## 2017-10-13 NOTE — TELEPHONE ENCOUNTER
SPOKE WITH PATIENT AND ADVISED IF UPCOMING APPOINTMENT FOR DR CLARKE 11/21/2017.  PATIENT EXPRESSED UNDERSTANDING FOR ALL THAT WAS DISCUSSED. LETTER SENT

## 2017-11-20 ENCOUNTER — APPOINTMENT (OUTPATIENT)
Dept: PREADMISSION TESTING | Facility: HOSPITAL | Age: 42
End: 2017-11-20

## 2017-11-20 ENCOUNTER — HOSPITAL ENCOUNTER (OUTPATIENT)
Dept: GENERAL RADIOLOGY | Facility: HOSPITAL | Age: 42
Discharge: HOME | End: 2017-11-20
Admitting: ORTHOPAEDIC SURGERY

## 2017-11-20 VITALS
WEIGHT: 278 LBS | HEIGHT: 72 IN | DIASTOLIC BLOOD PRESSURE: 96 MMHG | OXYGEN SATURATION: 97 % | BODY MASS INDEX: 37.65 KG/M2 | SYSTOLIC BLOOD PRESSURE: 150 MMHG | HEART RATE: 77 BPM | RESPIRATION RATE: 18 BRPM

## 2017-11-20 LAB
APTT PPP: 26.3 SECONDS (ref 24.1–34.8)
BASOPHILS # BLD AUTO: 0.05 10*3/MM3 (ref 0–0.2)
BASOPHILS NFR BLD AUTO: 0.8 % (ref 0–2)
BILIRUB UR QL STRIP: NEGATIVE
CLARITY UR: CLEAR
COLOR UR: YELLOW
DEPRECATED RDW RBC AUTO: 37.9 FL (ref 40–54)
EOSINOPHIL # BLD AUTO: 0.13 10*3/MM3 (ref 0–0.7)
EOSINOPHIL NFR BLD AUTO: 2.1 % (ref 0–4)
ERYTHROCYTE [DISTWIDTH] IN BLOOD BY AUTOMATED COUNT: 12.6 % (ref 12–15)
GLUCOSE UR STRIP-MCNC: ABNORMAL MG/DL
HCT VFR BLD AUTO: 43.7 % (ref 40–52)
HGB BLD-MCNC: 15.7 G/DL (ref 14–18)
HGB UR QL STRIP.AUTO: NEGATIVE
IMM GRANULOCYTES # BLD: 0.01 10*3/MM3 (ref 0–0.03)
IMM GRANULOCYTES NFR BLD: 0.2 % (ref 0–5)
INR PPP: 0.85 (ref 0.91–1.09)
KETONES UR QL STRIP: NEGATIVE
LEUKOCYTE ESTERASE UR QL STRIP.AUTO: NEGATIVE
LYMPHOCYTES # BLD AUTO: 1.77 10*3/MM3 (ref 0.72–4.86)
LYMPHOCYTES NFR BLD AUTO: 28.7 % (ref 15–45)
MCH RBC QN AUTO: 30 PG (ref 28–32)
MCHC RBC AUTO-ENTMCNC: 35.9 G/DL (ref 33–36)
MCV RBC AUTO: 83.6 FL (ref 82–95)
MONOCYTES # BLD AUTO: 0.39 10*3/MM3 (ref 0.19–1.3)
MONOCYTES NFR BLD AUTO: 6.3 % (ref 4–12)
NEUTROPHILS # BLD AUTO: 3.81 10*3/MM3 (ref 1.87–8.4)
NEUTROPHILS NFR BLD AUTO: 61.9 % (ref 39–78)
NITRITE UR QL STRIP: NEGATIVE
PH UR STRIP.AUTO: <=5 [PH] (ref 5–8)
PLATELET # BLD AUTO: 237 10*3/MM3 (ref 130–400)
PMV BLD AUTO: 10.3 FL (ref 6–12)
PROT UR QL STRIP: NEGATIVE
PROTHROMBIN TIME: 11.9 SECONDS (ref 11.9–14.6)
RBC # BLD AUTO: 5.23 10*6/MM3 (ref 4.8–5.9)
SP GR UR STRIP: >1.03 (ref 1–1.03)
UROBILINOGEN UR QL STRIP: ABNORMAL
WBC NRBC COR # BLD: 6.16 10*3/MM3 (ref 4.8–10.8)

## 2017-11-20 PROCEDURE — 85610 PROTHROMBIN TIME: CPT | Performed by: ORTHOPAEDIC SURGERY

## 2017-11-20 PROCEDURE — 93005 ELECTROCARDIOGRAM TRACING: CPT

## 2017-11-20 PROCEDURE — 85730 THROMBOPLASTIN TIME PARTIAL: CPT | Performed by: ORTHOPAEDIC SURGERY

## 2017-11-20 PROCEDURE — 36415 COLL VENOUS BLD VENIPUNCTURE: CPT

## 2017-11-20 PROCEDURE — 93010 ELECTROCARDIOGRAM REPORT: CPT | Performed by: INTERNAL MEDICINE

## 2017-11-20 PROCEDURE — 81003 URINALYSIS AUTO W/O SCOPE: CPT | Performed by: ORTHOPAEDIC SURGERY

## 2017-11-20 PROCEDURE — 85025 COMPLETE CBC W/AUTO DIFF WBC: CPT | Performed by: ORTHOPAEDIC SURGERY

## 2017-11-20 PROCEDURE — 71020 HC CHEST PA AND LATERAL: CPT

## 2017-11-20 NOTE — DISCHARGE INSTRUCTIONS
DAY OF SURGERY INSTRUCTIONS        YOUR SURGEON: ANGEL VILLEGAS MD  PROCEDURE: ANTERIOR DECOMPRESSION ANTERIOR LUMBAR INTERBODY FUSION WITH INSTRUMENTATION LUMBAR 4 SACRAL 1    DATE OF SURGERY: November 27TH 2017    ARRIVAL TIME: AS DIRECTED BY OFFICE    DAY OF SURGERY TAKE ONLY THESE MEDICATIONS: NORVASC WITH A SIP OF WATER        BEFORE YOU COME TO THE HOSPITAL  (Pre-op instructions)  • Do not eat, drink, smoke or chew gum after midnight the night before surgery.  This also includes no mints.  • Morning of surgery take only the medicines you have been instructed with a sip of water unless otherwise instructed  by your physician.  • Do not shave, wear makeup or dark nail polish.  • Remove all jewelry including rings.  • Leave anything you consider valuable at home.  • Leave your suitcase in the car until after your surgery.  • Bring the following with you if applicable:  o Picture ID and insurance, Medicare or Medicaid cards  o Co-pay/deductible required by insurance (cash, check, credit card)  o Copy of advance directive, living will or power-of- documents if not brought to PAT  o CPAP or BIPAP mask and tubing  o Relaxation aids (MP3 player, book, magazine)  • On the day of surgery check in at registration located at the main entrance of the hospital.       Outpatient Surgery Guidelines, Adult  Outpatient procedures are those for which the person having the procedure is allowed to go home the same day as the procedure. Various procedures are done on an outpatient basis. You should follow some general guidelines if you will be having an outpatient procedure.  LET YOUR HEALTH CARE PROVIDER KNOW ABOUT:  · Any allergies you have.  · All medicines you are taking, including vitamins, herbs, eye drops, creams, and over-the-counter medicines.  · Previous problems you or members of your family have had with the use of anesthetics.  · Any blood disorders you have.  · Previous surgeries you have had.  · Medical  conditions you have.  RISKS AND COMPLICATIONS  Your health care provider will discuss possible risks and complications with you before surgery. Common risks and complications include:    · Problems due to the use of anesthetics.  · Blood loss and replacement (does not apply to minor surgical procedures).  · Temporary increase in pain due to surgery.  · Uncorrected pain or problems that the surgery was meant to correct.  · Infection.  · New damage.  BEFORE THE PROCEDURE  · Ask your health care provider about changing or stopping your regular medicines. You may need to stop taking certain medicines in the days or weeks before the procedure.  · Stop smoking at least 2 weeks before surgery. This lowers your risk for complications during and after surgery. Ask your health care provider for help with this if needed.  · Eat your usual meals and a light supper the day before surgery. Continue fluid intake. Do not drink alcohol.  · Do not eat or drink after midnight the night before your surgery.   · Arrange for someone to take you home and to stay with you for 24 hours after the procedure. Medicine given for your procedure may affect your ability to drive or to care for yourself.  · Call your health care provider's office if you develop an illness or problem that may prevent you from safely having your procedure.  AFTER THE PROCEDURE  After surgery, you will be taken to a recovery area, where your progress will be monitored. If there are no complications, you will be allowed to go home when you are awake, stable, and taking fluids well. You may have numbness around the surgical site. Healing will take some time. You will have tenderness at the surgical site and may have some swelling and bruising. You may also have some nausea.  HOME CARE INSTRUCTIONS  · Do not drive for 24 hours, or as directed by your health care provider. Do not drive while taking prescription pain medicines.  · Do not drink alcohol for 24 hours.  · Do  not make important decisions or sign legal documents for 24 hours.  · You may resume a normal diet and activities as directed.  · Do not lift anything heavier than 10 pounds (4.5 kg) or play contact sports until your health care provider says it is okay.  · Change your bandages (dressings) as directed.  · Only take over-the-counter or prescription medicines as directed by your health care provider.  · Follow up with your health care provider as directed.  SEEK MEDICAL CARE IF:  · You have increased bleeding (more than a small spot) from the surgical site.  · You have redness, swelling, or increasing pain in the wound.  · You see pus coming from the wound.  · You have a fever.  · You notice a bad smell coming from the wound or dressing.  · You feel lightheaded or faint.  · You develop a rash.  · You have trouble breathing.  · You develop allergies.  MAKE SURE YOU:  · Understand these instructions.  · Will watch your condition.  · Will get help right away if you are not doing well or get worse.     This information is not intended to replace advice given to you by your health care provider. Make sure you discuss any questions you have with your health care provider.     Document Released: 09/12/2002 Document Revised: 05/03/2016 Document Reviewed: 05/22/2014  [employer] Interactive Patient Education ©2016 [employer]       Fall Prevention in Hospitals, Adult  As a hospital patient, your condition and the treatments you receive can increase your risk for falls. Some additional risk factors for falls in a hospital include:  · Being in an unfamiliar environment.  · Being on bed rest.  · Your surgery.  · Taking certain medicines.  · Your tubing requirements, such as intravenous (IV) therapy or catheters.  It is important that you learn how to decrease fall risks while at the hospital. Below are important tips that can help prevent falls.  SAFETY TIPS FOR PREVENTING FALLS  Talk about your risk of falling.  · Ask your health  care provider why you are at risk for falling. Is it your medicine, illness, tubing placement, or something else?  · Make a plan with your health care provider to keep you safe from falls.  · Ask your health care provider or pharmacist about side effects of your medicines. Some medicines can make you dizzy or affect your coordination.  Ask for help.  · Ask for help before getting out of bed. You may need to press your call button.  · Ask for assistance in getting safely to the toilet.  · Ask for a walker or cane to be put at your bedside. Ask that most of the side rails on your bed be placed up before your health care provider leaves the room.  · Ask family or friends to sit with you.  · Ask for things that are out of your reach, such as your glasses, hearing aids, telephone, bedside table, or call button.  Follow these tips to avoid falling:  · Stay lying or seated, rather than standing, while waiting for help.  · Wear rubber-soled slippers or shoes whenever you walk in the hospital.  · Avoid quick, sudden movements.  ¨ Change positions slowly.  ¨ Sit on the side of your bed before standing.  ¨ Stand up slowly and wait before you start to walk.  · Let your health care provider know if there is a spill on the floor.  · Pay careful attention to the medical equipment, electrical cords, and tubes around you.  · When you need help, use your call button by your bed or in the bathroom. Wait for one of your health care providers to help you.  · If you feel dizzy or unsure of your footing, return to bed and wait for assistance.  · Avoid being distracted by the TV, telephone, or another person in your room.  · Do not lean or support yourself on rolling objects, such as IV poles or bedside tables.     This information is not intended to replace advice given to you by your health care provider. Make sure you discuss any questions you have with your health care provider.     Document Released: 12/15/2001 Document Revised:  01/08/2016 Document Reviewed: 08/25/2013  [employer] Interactive Patient Education ©2016 [employer]       Surgical Site Infections FAQs  What is a Surgical Site Infection (SSI)?  A surgical site infection is an infection that occurs after surgery in the part of the body where the surgery took place. Most patients who have surgery do not develop an infection. However, infections develop in about 1 to 3 out of every 100 patients who have surgery.  Some of the common symptoms of a surgical site infection are:  · Redness and pain around the area where you had surgery  · Drainage of cloudy fluid from your surgical wound  · Fever  Can SSIs be treated?  Yes. Most surgical site infections can be treated with antibiotics. The antibiotic given to you depends on the bacteria (germs) causing the infection. Sometimes patients with SSIs also need another surgery to treat the infection.  What are some of the things that hospitals are doing to prevent SSIs?  To prevent SSIs, doctors, nurses, and other healthcare providers:  · Clean their hands and arms up to their elbows with an antiseptic agent just before the surgery.  · Clean their hands with soap and water or an alcohol-based hand rub before and after caring for each patient.  · May remove some of your hair immediately before your surgery using electric clippers if the hair is in the same area where the procedure will occur. They should not shave you with a razor.  · Wear special hair covers, masks, gowns, and gloves during surgery to keep the surgery area clean.  · Give you antibiotics before your surgery starts. In most cases, you should get antibiotics within 60 minutes before the surgery starts and the antibiotics should be stopped within 24 hours after surgery.  · Clean the skin at the site of your surgery with a special soap that kills germs.  What can I do to help prevent SSIs?  Before your surgery:  · Tell your doctor about other medical problems you may have. Health  problems such as allergies, diabetes, and obesity could affect your surgery and your treatment.  · Quit smoking. Patients who smoke get more infections. Talk to your doctor about how you can quit before your surgery.  · Do not shave near where you will have surgery. Shaving with a razor can irritate your skin and make it easier to develop an infection.  At the time of your surgery:  · Speak up if someone tries to shave you with a razor before surgery. Ask why you need to be shaved and talk with your surgeon if you have any concerns.  · Ask if you will get antibiotics before surgery.  After your surgery:  · Make sure that your healthcare providers clean their hands before examining you, either with soap and water or an alcohol-based hand rub.  · If you do not see your providers clean their hands, please ask them to do so.  · Family and friends who visit you should not touch the surgical wound or dressings.  · Family and friends should clean their hands with soap and water or an alcohol-based hand rub before and after visiting you. If you do not see them clean their hands, ask them to clean their hands.  What do I need to do when I go home from the hospital?  · Before you go home, your doctor or nurse should explain everything you need to know about taking care of your wound. Make sure you understand how to care for your wound before you leave the hospital.  · Always clean your hands before and after caring for your wound.  · Before you go home, make sure you know who to contact if you have questions or problems after you get home.  · If you have any symptoms of an infection, such as redness and pain at the surgery site, drainage, or fever, call your doctor immediately.  If you have additional questions, please ask your doctor or nurse.  Developed and co-sponsored by The Society for Healthcare Epidemiology of [place] (SHEA); Infectious Diseases Society of [place] (IDSA); American Hospital Association; Association for  Professionals in Infection Control and Epidemiology (APIC); Centers for Disease Control and Prevention (CDC); and The Joint Commission.     This information is not intended to replace advice given to you by your health care provider. Make sure you discuss any questions you have with your health care provider.     Document Released: 12/23/2014 Document Revised: 01/08/2016 Document Reviewed: 03/02/2016  [employer] Interactive Patient Education ©2016 [employer]       [facility]  CHG 4% Patient Instruction Sheet    Preparing the Skin Before Surgery  Preparing or “prepping” skin before surgery can reduce the risk of infection at the surgical site. To make the process easier,[facility] has chosen 4% Chlorhexidine Gluconate (CHG) antiseptic solution.   The steps below outline the prepping process and should be carefully followed.                                                                                                                                                      Prep the skin at the following time(s):                                                      We recommend you shower the night before surgery, and again the morning of surgery with the 4% CHG antiseptic solution using half of the bottle and a cloth each time.  Dress in clean clothes/sleepwear after showering.  See instructions below for application.          Do not apply any lotions or moisturizers.       Do not shave the area to be prepped for at least 2 days prior to surgery.    Clipping the hair may be done immediately prior to your surgery at the hospital    if needed.    Directions:  Thoroughly rinse your body with water.  Apply 4% CHG to a cloth and wash skin gently, paying special attention to the operative site.  Rinse again thoroughly.  Once you have begun using this product do not apply anything else to your skin. If itching or redness persists, rinse affected areas and discontinue use.    When using this product:  • Keep out of eyes,  ears, and mouth.  • If solution should contact these areas, rinse out promptly and thoroughly with water.  • For external use only.  • Do not use in genital area, on your face or head.      PATIENT/FAMILY/RESPONSIBLE PARTY VERBALIZES UNDERSTANDING OF ABOVE EDUCATION.  COPY OF PAIN SCALE GIVEN AND REVIEWED WITH VERBALIZED UNDERSTANDING.

## 2017-11-21 ENCOUNTER — OFFICE VISIT (OUTPATIENT)
Dept: VASCULAR SURGERY | Facility: CLINIC | Age: 42
End: 2017-11-21

## 2017-11-21 VITALS
HEART RATE: 96 BPM | HEIGHT: 70 IN | SYSTOLIC BLOOD PRESSURE: 152 MMHG | BODY MASS INDEX: 38.94 KG/M2 | DIASTOLIC BLOOD PRESSURE: 98 MMHG | WEIGHT: 272 LBS

## 2017-11-21 DIAGNOSIS — E78.00 HYPERCHOLESTEROLEMIA: Chronic | ICD-10-CM

## 2017-11-21 DIAGNOSIS — G89.29 CHRONIC BILATERAL LOW BACK PAIN WITH BILATERAL SCIATICA: Primary | ICD-10-CM

## 2017-11-21 DIAGNOSIS — M54.42 CHRONIC BILATERAL LOW BACK PAIN WITH BILATERAL SCIATICA: Primary | ICD-10-CM

## 2017-11-21 DIAGNOSIS — M54.16 CHRONIC LUMBAR RADICULOPATHY: ICD-10-CM

## 2017-11-21 DIAGNOSIS — M54.41 CHRONIC BILATERAL LOW BACK PAIN WITH BILATERAL SCIATICA: Primary | ICD-10-CM

## 2017-11-21 DIAGNOSIS — I10 ESSENTIAL HYPERTENSION: Chronic | ICD-10-CM

## 2017-11-21 PROCEDURE — 99204 OFFICE O/P NEW MOD 45 MIN: CPT | Performed by: SURGERY

## 2017-11-21 RX ORDER — HYDROCODONE BITARTRATE AND ACETAMINOPHEN 10; 325 MG/1; MG/1
TABLET ORAL
Refills: 0 | Status: ON HOLD | COMMUNITY
Start: 2017-10-23 | End: 2017-11-27

## 2017-11-21 RX ORDER — ESZOPICLONE 2 MG/1
TABLET, FILM COATED ORAL
Refills: 0 | COMMUNITY
Start: 2017-11-01 | End: 2018-12-05

## 2017-11-21 NOTE — PROGRESS NOTES
11/21/2017      SHERI Lunsford MD  [address] [place] [address]    Trevor Hernandez  1975    Chief Complaint   Patient presents with   • Pre-op Exam     ALIF consult.       Dear SHERI Lunsford MD:      HPI  I had the pleasure of seeing your patient Trevor Hernandez in the office today.  Thank you kindly for this consultation.  As you recall, Trevor Hernandez is a 42 y.o.  male who you are currently following for chronic back pain.  He is scheduled on 11/27/17 for anterior lumbar interbody fusion with you.  He does have a history of back surgery x2, no other surgical history by Dr. Rosales and Dr. Diaz.  He has worsening back pain radiating into buttocks and legs, worse on the left.    Past Medical History:   Diagnosis Date   • Chronic back pain    • Diabetes mellitus    • Diabetic neuropathy    • History of migraine headaches    • Hypercholesterolemia    • Hypercholesterolemia    • Hypertension    • Obesity        Past Surgical History:   Procedure Laterality Date   • BACK SURGERY      X 2       Family History   Problem Relation Age of Onset   • Diabetes Other    • Heart disease Other    • Diabetes Father    • Heart disease Father        Social History     Social History   • Marital status:      Spouse name: N/A   • Number of children: N/A   • Years of education: N/A     Occupational History   • Not on file.     Social History Main Topics   • Smoking status: Never Smoker   • Smokeless tobacco: Former User     Types: Chew   • Alcohol use No   • Drug use: No   • Sexual activity: Defer     Other Topics Concern   • Not on file     Social History Narrative       No Known Allergies    Prior to Admission medications    Medication Sig Start Date End Date Taking? Authorizing Provider   amLODIPine (NORVASC) 5 MG tablet Take 1 tablet by mouth Daily. 5/24/17  Yes Efrem Deng MD   aspirin 81 MG chewable tablet Chew 1 tablet Daily. 3/31/17  Yes Librado Clark MD    atorvastatin (LIPITOR) 10 MG tablet Take 1 tablet by mouth Daily. 3/31/17  Yes Librado Clark MD   butalbital-acetaminophen-caffeine (FIORICET, ESGIC) -40 MG per tablet Take  tablets by mouth Every 6 (Six) Hours As Needed. 9/21/16  Yes Historical Provider, MD   cyclobenzaprine (FLEXERIL) 10 MG tablet Take 10 mg by mouth Every 8 (Eight) Hours As Needed.   Yes Historical Provider, MD   Dulaglutide 0.75 MG/0.5ML solution pen-injector Inject 0.75 mg under the skin 1 (One) Time Per Week. Alternatives,  tanzeum 30 mg weekly,  victoza 1.2 mg daily , bydureon 2mg weekly 2/14/17  Yes Ruben Lester MD   eszopiclone (LUNESTA) 2 MG tablet TAKE 1/2 TO 1 TABLET AT BEDTIME 11/1/17  Yes Historical Provider, MD   gabapentin (NEURONTIN) 600 MG tablet Take 2 tablets three times daily 9/25/17  Yes Efrem Deng MD   HYDROcodone-acetaminophen (NORCO)  MG per tablet TAKE 1 TABLET EVERY 4 HOURS AS NEEDED FOR PAIN 10/23/17  Yes Historical Provider, MD   HYDROcodone-acetaminophen (NORCO) 7.5-325 MG per tablet Take 1 tablet by mouth Every 4 (Four) Hours As Needed for Moderate Pain . 9/12/17  Yes Efrem Deng MD   sertraline (ZOLOFT) 100 MG tablet Take 1 tablet by mouth Daily. 3/27/17  Yes Efrem Deng MD   Insulin Glargine (TOUJEO SOLOSTAR) 300 UNIT/ML solution pen-injector Inject 30 units of lipase under the skin Daily.  Patient taking differently: Inject 30 units of lipase under the skin Daily. EVERY AM 2/14/17 11/21/17 Yes Ruben Lester MD   Insulin Pen Needle (PEN NEEDLES) 31G X 8 MM misc use as indicated 4 times daily. 2/14/17   Ruben Lester MD       Review of Systems   Constitutional: Negative.    HENT: Negative.    Eyes: Negative.    Respiratory: Negative.    Cardiovascular: Negative.    Gastrointestinal: Negative.    Endocrine: Negative.    Genitourinary: Negative.    Musculoskeletal: Positive for back pain.   Skin: Negative.    Allergic/Immunologic:  "Negative.    Neurological: Negative.    Hematological: Negative.    Psychiatric/Behavioral: Negative.    All other systems reviewed and are negative.      /98  Pulse 96  Ht 70\" (177.8 cm)  Wt 272 lb (123 kg)  BMI 39.03 kg/m2  Physical Exam   Constitutional: He is oriented to person, place, and time. He appears well-developed and well-nourished.   HENT:   Head: Normocephalic and atraumatic.   Eyes: Pupils are equal, round, and reactive to light. No scleral icterus.   Neck: Normal range of motion. Neck supple. No JVD present. Carotid bruit is not present. No thyromegaly present.   Cardiovascular: Normal rate, regular rhythm, normal heart sounds and intact distal pulses.    Pulses:       Carotid pulses are 2+ on the right side, and 2+ on the left side.       Femoral pulses are 2+ on the right side, and 2+ on the left side.       Popliteal pulses are 2+ on the right side, and 2+ on the left side.        Dorsalis pedis pulses are 2+ on the right side, and 2+ on the left side.        Posterior tibial pulses are 2+ on the right side, and 2+ on the left side.   Pulmonary/Chest: Effort normal and breath sounds normal.   Abdominal: Soft. Bowel sounds are normal. He exhibits no distension, no abdominal bruit and no mass. There is no hepatosplenomegaly. There is no tenderness.   Musculoskeletal: Normal range of motion. He exhibits no edema.   Back pain   Lymphadenopathy:     He has no cervical adenopathy.   Neurological: He is alert and oriented to person, place, and time. He has normal strength. No cranial nerve deficit or sensory deficit.   Skin: Skin is warm, dry and intact.   Psychiatric: He has a normal mood and affect. His behavior is normal. Judgment and thought content normal.   Nursing note and vitals reviewed.      Xr Chest Pa & Lateral    Result Date: 11/20/2017  Narrative: EXAMINATION: XR CHEST PA AND LATERAL-  11/20/2017 9:46 AM CST  TWO-VIEW CHEST:  HISTORY: Hypertension. Preop  Frontal and lateral " projection chest radiograph obtained.  COMPARISON:  12/14/2009  FINDINGS:  The lungs are clear without acute infiltrates.  The heart is normal in size, pulmonary circulation appropriate, without heart failure.  The bony structures are intact.                                                                                                                  Impression: 1. No acute cardiopulmonary process.   This report was finalized on 11/20/2017 09:46 by Dr. Alberto Pearce MD.      Patient Active Problem List   Diagnosis   • Chest pain   • Diabetes mellitus   • Hypertension   • Obesity   • Diabetic neuropathy   • Hypercholesterolemia         ICD-10-CM ICD-9-CM   1. Chronic bilateral low back pain with bilateral sciatica M54.42 724.2    M54.41 724.3    G89.29 338.29   2. Essential hypertension I10 401.9   3. Hypercholesterolemia E78.00 272.0   4. Chronic lumbar radiculopathy M54.16 724.4       Lab Frequency Next Occurrence   Lipid Panel Once 2/15/2017   Vitamin B12 Once 2/15/2017   Comprehensive Metabolic Panel Once 2/15/2017   Vitamin D 25 Hydroxy Once 2/15/2017   Hemoglobin & Hematocrit, Blood Once 2/15/2017   TSH Once 2/15/2017   MicroAlbumin, Urine, Random Once 2/10/2017   Hemoglobin A1c Once 2/15/2017   Protein, Urine, 24 Hour Once 2/10/2017   Vitamin B12 Once 2/15/2017       Plan: After thoroughly evaluating Trevor Hernandez, I believe the best course of action is to proceed with an anterior lumbar interbody fusion.  Risks of ALIF were discussed and include, but are not limited to, bleeding, infection, nerve damage, vessel damage, retrograde ejaculation, bowel damage, ureteral damage, DVT, MI, stroke, and death.  The patient understands these risks and wishes to proceed with procedure.  I also counseled him on his vascular risk factors with regards to hypertension and hypercholesterolemia.  The patient can continue taking her current medication regimen as previously planned.  This was all discussed in full  with complete understanding.    Thank you for allowing me to participate in the care of your patient.  Please do not hesitate with any questions or concerns.  I will keep you aware of any further encounters with Trevor Hernandez.        Sincerely yours,         DO Efrem Palafox MD

## 2017-11-22 DIAGNOSIS — G89.29 CHRONIC MIDLINE LOW BACK PAIN, WITH SCIATICA PRESENCE UNSPECIFIED: Primary | ICD-10-CM

## 2017-11-22 DIAGNOSIS — M54.5 CHRONIC MIDLINE LOW BACK PAIN, WITH SCIATICA PRESENCE UNSPECIFIED: Primary | ICD-10-CM

## 2017-11-27 ENCOUNTER — APPOINTMENT (OUTPATIENT)
Dept: GENERAL RADIOLOGY | Facility: HOSPITAL | Age: 42
End: 2017-11-27

## 2017-11-27 ENCOUNTER — ANESTHESIA EVENT (OUTPATIENT)
Dept: PERIOP | Facility: HOSPITAL | Age: 42
End: 2017-11-27

## 2017-11-27 ENCOUNTER — ANESTHESIA (OUTPATIENT)
Dept: PERIOP | Facility: HOSPITAL | Age: 42
End: 2017-11-27

## 2017-11-27 ENCOUNTER — HOSPITAL ENCOUNTER (INPATIENT)
Facility: HOSPITAL | Age: 42
LOS: 3 days | Discharge: HOME | End: 2017-11-30
Attending: ORTHOPAEDIC SURGERY | Admitting: ORTHOPAEDIC SURGERY

## 2017-11-27 DIAGNOSIS — Z78.9 DECREASED ACTIVITIES OF DAILY LIVING (ADL): ICD-10-CM

## 2017-11-27 PROBLEM — M48.061 LUMBAR STENOSIS: Status: ACTIVE | Noted: 2017-11-27

## 2017-11-27 LAB
ABO GROUP BLD: NORMAL
ALBUMIN SERPL-MCNC: 4.4 G/DL (ref 3.5–5)
ALBUMIN/GLOB SERPL: 1.1 G/DL (ref 1.1–2.5)
ALP SERPL-CCNC: 151 U/L (ref 24–120)
ALT SERPL W P-5'-P-CCNC: 53 U/L (ref 0–54)
ANION GAP SERPL CALCULATED.3IONS-SCNC: 13 MMOL/L (ref 4–13)
AST SERPL-CCNC: 42 U/L (ref 7–45)
BILIRUB SERPL-MCNC: 0.9 MG/DL (ref 0.1–1)
BLD GP AB SCN SERPL QL: NEGATIVE
BUN BLD-MCNC: 15 MG/DL (ref 5–21)
BUN/CREAT SERPL: 23.1 (ref 7–25)
CALCIUM SPEC-SCNC: 9.3 MG/DL (ref 8.4–10.4)
CHLORIDE SERPL-SCNC: 99 MMOL/L (ref 98–110)
CO2 SERPL-SCNC: 23 MMOL/L (ref 24–31)
CREAT BLD-MCNC: 0.65 MG/DL (ref 0.5–1.4)
GFR SERPL CREATININE-BSD FRML MDRD: 135 ML/MIN/1.73
GLOBULIN UR ELPH-MCNC: 3.9 GM/DL
GLUCOSE BLD-MCNC: 313 MG/DL (ref 70–100)
GLUCOSE BLDC GLUCOMTR-MCNC: 272 MG/DL (ref 70–130)
GLUCOSE BLDC GLUCOMTR-MCNC: 301 MG/DL (ref 70–130)
POTASSIUM BLD-SCNC: 4.9 MMOL/L (ref 3.5–5.3)
PROT SERPL-MCNC: 8.3 G/DL (ref 6.3–8.7)
RH BLD: POSITIVE
SODIUM BLD-SCNC: 135 MMOL/L (ref 135–145)

## 2017-11-27 PROCEDURE — 86850 RBC ANTIBODY SCREEN: CPT | Performed by: SURGERY

## 2017-11-27 PROCEDURE — 80053 COMPREHEN METABOLIC PANEL: CPT | Performed by: ORTHOPAEDIC SURGERY

## 2017-11-27 PROCEDURE — 25010000002 MIDAZOLAM PER 1 MG: Performed by: ANESTHESIOLOGY

## 2017-11-27 PROCEDURE — 94799 UNLISTED PULMONARY SVC/PX: CPT

## 2017-11-27 PROCEDURE — 0SB40ZZ EXCISION OF LUMBOSACRAL DISC, OPEN APPROACH: ICD-10-PCS | Performed by: ORTHOPAEDIC SURGERY

## 2017-11-27 PROCEDURE — 25010000002 HYDROMORPHONE PER 4 MG: Performed by: ANESTHESIOLOGY

## 2017-11-27 PROCEDURE — 4A11X4G MONITORING OF PERIPHERAL NERVOUS ELECTRICAL ACTIVITY, INTRAOPERATIVE, EXTERNAL APPROACH: ICD-10-PCS | Performed by: ORTHOPAEDIC SURGERY

## 2017-11-27 PROCEDURE — 94640 AIRWAY INHALATION TREATMENT: CPT

## 2017-11-27 PROCEDURE — 25010000003 CEFAZOLIN 1 GM/50ML SOLUTION: Performed by: ORTHOPAEDIC SURGERY

## 2017-11-27 PROCEDURE — 25010000002 ONDANSETRON PER 1 MG: Performed by: NURSE ANESTHETIST, CERTIFIED REGISTERED

## 2017-11-27 PROCEDURE — 25010000002 NEOSTIGMINE PER 0.5 MG: Performed by: NURSE ANESTHETIST, CERTIFIED REGISTERED

## 2017-11-27 PROCEDURE — 25010000002 ONDANSETRON PER 1 MG: Performed by: ANESTHESIOLOGY

## 2017-11-27 PROCEDURE — 63710000001 INSULIN REGULAR HUMAN PER 5 UNITS: Performed by: ANESTHESIOLOGY

## 2017-11-27 PROCEDURE — 25010000003 CEFAZOLIN PER 500 MG: Performed by: ORTHOPAEDIC SURGERY

## 2017-11-27 PROCEDURE — 72100 X-RAY EXAM L-S SPINE 2/3 VWS: CPT

## 2017-11-27 PROCEDURE — G8988 SELF CARE GOAL STATUS: HCPCS

## 2017-11-27 PROCEDURE — 25010000002 HYDROMORPHONE PER 4 MG: Performed by: ORTHOPAEDIC SURGERY

## 2017-11-27 PROCEDURE — 97166 OT EVAL MOD COMPLEX 45 MIN: CPT

## 2017-11-27 PROCEDURE — C1713 ANCHOR/SCREW BN/BN,TIS/BN: HCPCS | Performed by: ORTHOPAEDIC SURGERY

## 2017-11-27 PROCEDURE — 86900 BLOOD TYPING SEROLOGIC ABO: CPT | Performed by: SURGERY

## 2017-11-27 PROCEDURE — 25010000002 PROPOFOL 10 MG/ML EMULSION: Performed by: NURSE ANESTHETIST, CERTIFIED REGISTERED

## 2017-11-27 PROCEDURE — 86901 BLOOD TYPING SEROLOGIC RH(D): CPT | Performed by: SURGERY

## 2017-11-27 PROCEDURE — G8989 SELF CARE D/C STATUS: HCPCS

## 2017-11-27 PROCEDURE — 76000 FLUOROSCOPY <1 HR PHYS/QHP: CPT

## 2017-11-27 PROCEDURE — 36415 COLL VENOUS BLD VENIPUNCTURE: CPT

## 2017-11-27 PROCEDURE — 82962 GLUCOSE BLOOD TEST: CPT

## 2017-11-27 PROCEDURE — 22558 ARTHRD ANT NTRBD MIN DSC LUM: CPT | Performed by: SURGERY

## 2017-11-27 PROCEDURE — G8987 SELF CARE CURRENT STATUS: HCPCS

## 2017-11-27 PROCEDURE — 74000 HC ABDOMEN KUB: CPT

## 2017-11-27 PROCEDURE — 25010000002 HYDROMORPHONE PER 4 MG: Performed by: NURSE ANESTHETIST, CERTIFIED REGISTERED

## 2017-11-27 PROCEDURE — 0SG30A0 FUSION OF LUMBOSACRAL JOINT WITH INTERBODY FUSION DEVICE, ANTERIOR APPROACH, ANTERIOR COLUMN, OPEN APPROACH: ICD-10-PCS | Performed by: ORTHOPAEDIC SURGERY

## 2017-11-27 DEVICE — BONE CANC CRUSHED 10CC: Type: IMPLANTABLE DEVICE | Status: FUNCTIONAL

## 2017-11-27 DEVICE — BONE GRAFT KIT 7510400 INFUSE MEDIUM
Type: IMPLANTABLE DEVICE | Status: FUNCTIONAL
Brand: INFUSE® BONE GRAFT

## 2017-11-27 DEVICE — IMPLANTABLE DEVICE: Type: IMPLANTABLE DEVICE | Status: FUNCTIONAL

## 2017-11-27 RX ORDER — SODIUM CHLORIDE, SODIUM LACTATE, POTASSIUM CHLORIDE, CALCIUM CHLORIDE 600; 310; 30; 20 MG/100ML; MG/100ML; MG/100ML; MG/100ML
100 INJECTION, SOLUTION INTRAVENOUS CONTINUOUS
Status: DISCONTINUED | OUTPATIENT
Start: 2017-11-27 | End: 2017-11-27 | Stop reason: HOSPADM

## 2017-11-27 RX ORDER — ROCURONIUM BROMIDE 10 MG/ML
INJECTION, SOLUTION INTRAVENOUS AS NEEDED
Status: DISCONTINUED | OUTPATIENT
Start: 2017-11-27 | End: 2017-11-27 | Stop reason: SURG

## 2017-11-27 RX ORDER — IPRATROPIUM BROMIDE AND ALBUTEROL SULFATE 2.5; .5 MG/3ML; MG/3ML
3 SOLUTION RESPIRATORY (INHALATION) ONCE AS NEEDED
Status: DISCONTINUED | OUTPATIENT
Start: 2017-11-27 | End: 2017-11-27 | Stop reason: HOSPADM

## 2017-11-27 RX ORDER — SUFENTANIL CITRATE 50 UG/ML
INJECTION EPIDURAL; INTRAVENOUS AS NEEDED
Status: DISCONTINUED | OUTPATIENT
Start: 2017-11-27 | End: 2017-11-27 | Stop reason: SURG

## 2017-11-27 RX ORDER — TIZANIDINE 4 MG/1
4 TABLET ORAL EVERY 8 HOURS PRN
Status: DISCONTINUED | OUTPATIENT
Start: 2017-11-27 | End: 2017-11-30 | Stop reason: HOSPADM

## 2017-11-27 RX ORDER — DIPHENHYDRAMINE HCL 25 MG
25 CAPSULE ORAL NIGHTLY PRN
Status: DISCONTINUED | OUTPATIENT
Start: 2017-11-27 | End: 2017-11-30 | Stop reason: HOSPADM

## 2017-11-27 RX ORDER — SODIUM CHLORIDE 9 MG/ML
75 INJECTION, SOLUTION INTRAVENOUS CONTINUOUS
Status: DISCONTINUED | OUTPATIENT
Start: 2017-11-27 | End: 2017-11-29 | Stop reason: SDUPTHER

## 2017-11-27 RX ORDER — NALOXONE HCL 0.4 MG/ML
0.04 VIAL (ML) INJECTION AS NEEDED
Status: DISCONTINUED | OUTPATIENT
Start: 2017-11-27 | End: 2017-11-27 | Stop reason: HOSPADM

## 2017-11-27 RX ORDER — SODIUM CHLORIDE, SODIUM LACTATE, POTASSIUM CHLORIDE, CALCIUM CHLORIDE 600; 310; 30; 20 MG/100ML; MG/100ML; MG/100ML; MG/100ML
1000 INJECTION, SOLUTION INTRAVENOUS CONTINUOUS
Status: DISCONTINUED | OUTPATIENT
Start: 2017-11-27 | End: 2017-11-27 | Stop reason: HOSPADM

## 2017-11-27 RX ORDER — MIDAZOLAM HYDROCHLORIDE 1 MG/ML
2 INJECTION INTRAMUSCULAR; INTRAVENOUS
Status: DISCONTINUED | OUTPATIENT
Start: 2017-11-27 | End: 2017-11-27 | Stop reason: HOSPADM

## 2017-11-27 RX ORDER — FAMOTIDINE 20 MG/1
20 TABLET, FILM COATED ORAL EVERY 12 HOURS SCHEDULED
Status: DISCONTINUED | OUTPATIENT
Start: 2017-11-27 | End: 2017-11-30 | Stop reason: HOSPADM

## 2017-11-27 RX ORDER — MAGNESIUM HYDROXIDE 1200 MG/15ML
LIQUID ORAL AS NEEDED
Status: DISCONTINUED | OUTPATIENT
Start: 2017-11-27 | End: 2017-11-27 | Stop reason: HOSPADM

## 2017-11-27 RX ORDER — OXYCODONE AND ACETAMINOPHEN 10; 325 MG/1; MG/1
2 TABLET ORAL EVERY 4 HOURS PRN
Status: DISCONTINUED | OUTPATIENT
Start: 2017-11-27 | End: 2017-11-30 | Stop reason: HOSPADM

## 2017-11-27 RX ORDER — MORPHINE SULFATE 2 MG/ML
2 INJECTION, SOLUTION INTRAMUSCULAR; INTRAVENOUS AS NEEDED
Status: DISCONTINUED | OUTPATIENT
Start: 2017-11-27 | End: 2017-11-27 | Stop reason: HOSPADM

## 2017-11-27 RX ORDER — AMLODIPINE BESYLATE 5 MG/1
5 TABLET ORAL DAILY
Status: DISCONTINUED | OUTPATIENT
Start: 2017-11-27 | End: 2017-11-30 | Stop reason: HOSPADM

## 2017-11-27 RX ORDER — OXYCODONE HCL 20 MG/1
20 TABLET, FILM COATED, EXTENDED RELEASE ORAL ONCE
Status: COMPLETED | OUTPATIENT
Start: 2017-11-27 | End: 2017-11-27

## 2017-11-27 RX ORDER — ONDANSETRON 2 MG/ML
4 INJECTION INTRAMUSCULAR; INTRAVENOUS EVERY 6 HOURS PRN
Status: DISCONTINUED | OUTPATIENT
Start: 2017-11-27 | End: 2017-11-30 | Stop reason: HOSPADM

## 2017-11-27 RX ORDER — METOCLOPRAMIDE HYDROCHLORIDE 5 MG/ML
5 INJECTION INTRAMUSCULAR; INTRAVENOUS
Status: DISCONTINUED | OUTPATIENT
Start: 2017-11-27 | End: 2017-11-27 | Stop reason: HOSPADM

## 2017-11-27 RX ORDER — PROPOFOL 10 MG/ML
VIAL (ML) INTRAVENOUS AS NEEDED
Status: DISCONTINUED | OUTPATIENT
Start: 2017-11-27 | End: 2017-11-27 | Stop reason: SURG

## 2017-11-27 RX ORDER — HYDRALAZINE HYDROCHLORIDE 20 MG/ML
5 INJECTION INTRAMUSCULAR; INTRAVENOUS
Status: DISCONTINUED | OUTPATIENT
Start: 2017-11-27 | End: 2017-11-27 | Stop reason: HOSPADM

## 2017-11-27 RX ORDER — GABAPENTIN 300 MG/1
600 CAPSULE ORAL EVERY 8 HOURS SCHEDULED
Status: DISCONTINUED | OUTPATIENT
Start: 2017-11-27 | End: 2017-11-28

## 2017-11-27 RX ORDER — FAMOTIDINE 10 MG/ML
20 INJECTION, SOLUTION INTRAVENOUS EVERY 12 HOURS SCHEDULED
Status: DISCONTINUED | OUTPATIENT
Start: 2017-11-27 | End: 2017-11-27 | Stop reason: SDUPTHER

## 2017-11-27 RX ORDER — IPRATROPIUM BROMIDE AND ALBUTEROL SULFATE 2.5; .5 MG/3ML; MG/3ML
3 SOLUTION RESPIRATORY (INHALATION)
Status: DISCONTINUED | OUTPATIENT
Start: 2017-11-27 | End: 2017-11-30 | Stop reason: HOSPADM

## 2017-11-27 RX ORDER — ONDANSETRON 2 MG/ML
4 INJECTION INTRAMUSCULAR; INTRAVENOUS AS NEEDED
Status: DISCONTINUED | OUTPATIENT
Start: 2017-11-27 | End: 2017-11-27 | Stop reason: HOSPADM

## 2017-11-27 RX ORDER — SODIUM CHLORIDE, SODIUM LACTATE, POTASSIUM CHLORIDE, CALCIUM CHLORIDE 600; 310; 30; 20 MG/100ML; MG/100ML; MG/100ML; MG/100ML
100 INJECTION, SOLUTION INTRAVENOUS CONTINUOUS PRN
Status: DISCONTINUED | OUTPATIENT
Start: 2017-11-27 | End: 2017-11-27 | Stop reason: HOSPADM

## 2017-11-27 RX ORDER — SODIUM CHLORIDE 0.9 % (FLUSH) 0.9 %
1-10 SYRINGE (ML) INJECTION AS NEEDED
Status: DISCONTINUED | OUTPATIENT
Start: 2017-11-27 | End: 2017-11-27 | Stop reason: HOSPADM

## 2017-11-27 RX ORDER — LABETALOL HYDROCHLORIDE 5 MG/ML
5 INJECTION, SOLUTION INTRAVENOUS
Status: DISCONTINUED | OUTPATIENT
Start: 2017-11-27 | End: 2017-11-27 | Stop reason: HOSPADM

## 2017-11-27 RX ORDER — ATORVASTATIN CALCIUM 10 MG/1
10 TABLET, FILM COATED ORAL NIGHTLY
Status: DISCONTINUED | OUTPATIENT
Start: 2017-11-27 | End: 2017-11-30 | Stop reason: HOSPADM

## 2017-11-27 RX ORDER — SODIUM CHLORIDE 0.9 % (FLUSH) 0.9 %
1-10 SYRINGE (ML) INJECTION AS NEEDED
Status: DISCONTINUED | OUTPATIENT
Start: 2017-11-27 | End: 2017-11-29 | Stop reason: SDUPTHER

## 2017-11-27 RX ORDER — FLUMAZENIL 0.1 MG/ML
0.2 INJECTION INTRAVENOUS AS NEEDED
Status: DISCONTINUED | OUTPATIENT
Start: 2017-11-27 | End: 2017-11-27 | Stop reason: HOSPADM

## 2017-11-27 RX ORDER — SERTRALINE HYDROCHLORIDE 100 MG/1
100 TABLET, FILM COATED ORAL DAILY
Status: DISCONTINUED | OUTPATIENT
Start: 2017-11-27 | End: 2017-11-30 | Stop reason: HOSPADM

## 2017-11-27 RX ORDER — ONDANSETRON 4 MG/1
4 TABLET, FILM COATED ORAL EVERY 6 HOURS PRN
Status: DISCONTINUED | OUTPATIENT
Start: 2017-11-27 | End: 2017-11-30 | Stop reason: HOSPADM

## 2017-11-27 RX ORDER — ONDANSETRON 2 MG/ML
INJECTION INTRAMUSCULAR; INTRAVENOUS AS NEEDED
Status: DISCONTINUED | OUTPATIENT
Start: 2017-11-27 | End: 2017-11-27 | Stop reason: SURG

## 2017-11-27 RX ORDER — MIDAZOLAM HYDROCHLORIDE 1 MG/ML
1 INJECTION INTRAMUSCULAR; INTRAVENOUS
Status: DISCONTINUED | OUTPATIENT
Start: 2017-11-27 | End: 2017-11-27 | Stop reason: HOSPADM

## 2017-11-27 RX ORDER — HYDROMORPHONE HCL 110MG/55ML
PATIENT CONTROLLED ANALGESIA SYRINGE INTRAVENOUS AS NEEDED
Status: DISCONTINUED | OUTPATIENT
Start: 2017-11-27 | End: 2017-11-27 | Stop reason: SURG

## 2017-11-27 RX ORDER — SODIUM CHLORIDE 0.9 % (FLUSH) 0.9 %
3 SYRINGE (ML) INJECTION AS NEEDED
Status: DISCONTINUED | OUTPATIENT
Start: 2017-11-27 | End: 2017-11-27 | Stop reason: HOSPADM

## 2017-11-27 RX ORDER — SODIUM CHLORIDE 9 MG/ML
75 INJECTION, SOLUTION INTRAVENOUS CONTINUOUS
Status: DISPENSED | OUTPATIENT
Start: 2017-11-27 | End: 2017-11-29

## 2017-11-27 RX ORDER — MEPERIDINE HYDROCHLORIDE 25 MG/ML
12.5 INJECTION INTRAMUSCULAR; INTRAVENOUS; SUBCUTANEOUS
Status: DISCONTINUED | OUTPATIENT
Start: 2017-11-27 | End: 2017-11-27 | Stop reason: HOSPADM

## 2017-11-27 RX ORDER — GLYCOPYRROLATE 0.2 MG/ML
INJECTION INTRAMUSCULAR; INTRAVENOUS AS NEEDED
Status: DISCONTINUED | OUTPATIENT
Start: 2017-11-27 | End: 2017-11-27 | Stop reason: SURG

## 2017-11-27 RX ORDER — ONDANSETRON 4 MG/1
4 TABLET, ORALLY DISINTEGRATING ORAL EVERY 6 HOURS PRN
Status: DISCONTINUED | OUTPATIENT
Start: 2017-11-27 | End: 2017-11-30 | Stop reason: HOSPADM

## 2017-11-27 RX ORDER — ONDANSETRON 2 MG/ML
4 INJECTION INTRAMUSCULAR; INTRAVENOUS EVERY 6 HOURS PRN
Status: DISCONTINUED | OUTPATIENT
Start: 2017-11-27 | End: 2017-11-27 | Stop reason: SDUPTHER

## 2017-11-27 RX ADMIN — SUFENTANIL CITRATE 30 MCG: 50 INJECTION, SOLUTION EPIDURAL; INTRAVENOUS at 07:42

## 2017-11-27 RX ADMIN — PROPOFOL 50 MG: 10 INJECTION, EMULSION INTRAVENOUS at 07:21

## 2017-11-27 RX ADMIN — CEFAZOLIN 3 G: 1 INJECTION, POWDER, FOR SOLUTION INTRAVENOUS at 07:26

## 2017-11-27 RX ADMIN — GABAPENTIN 600 MG: 300 CAPSULE ORAL at 14:43

## 2017-11-27 RX ADMIN — INSULIN HUMAN 6 UNITS: 100 INJECTION, SOLUTION PARENTERAL at 06:46

## 2017-11-27 RX ADMIN — SODIUM CHLORIDE, POTASSIUM CHLORIDE, SODIUM LACTATE AND CALCIUM CHLORIDE 1000 ML: 600; 310; 30; 20 INJECTION, SOLUTION INTRAVENOUS at 10:30

## 2017-11-27 RX ADMIN — HYDROMORPHONE HYDROCHLORIDE 1 MG: 2 INJECTION, SOLUTION INTRAMUSCULAR; INTRAVENOUS; SUBCUTANEOUS at 09:46

## 2017-11-27 RX ADMIN — SODIUM CHLORIDE, POTASSIUM CHLORIDE, SODIUM LACTATE AND CALCIUM CHLORIDE 1000 ML: 600; 310; 30; 20 INJECTION, SOLUTION INTRAVENOUS at 06:01

## 2017-11-27 RX ADMIN — SUFENTANIL CITRATE 20 MCG: 50 INJECTION, SOLUTION EPIDURAL; INTRAVENOUS at 07:18

## 2017-11-27 RX ADMIN — SODIUM CHLORIDE, POTASSIUM CHLORIDE, SODIUM LACTATE AND CALCIUM CHLORIDE 100 ML/HR: 600; 310; 30; 20 INJECTION, SOLUTION INTRAVENOUS at 05:58

## 2017-11-27 RX ADMIN — HYDROMORPHONE HYDROCHLORIDE 1 MG: 1 INJECTION, SOLUTION INTRAMUSCULAR; INTRAVENOUS; SUBCUTANEOUS at 18:32

## 2017-11-27 RX ADMIN — HYDROMORPHONE HYDROCHLORIDE 1 MG: 2 INJECTION, SOLUTION INTRAMUSCULAR; INTRAVENOUS; SUBCUTANEOUS at 09:45

## 2017-11-27 RX ADMIN — CEFAZOLIN SODIUM 1 G: 1 INJECTION, SOLUTION INTRAVENOUS at 14:43

## 2017-11-27 RX ADMIN — SUFENTANIL CITRATE 25 MCG: 50 INJECTION, SOLUTION EPIDURAL; INTRAVENOUS at 08:15

## 2017-11-27 RX ADMIN — PROPOFOL 150 MG: 10 INJECTION, EMULSION INTRAVENOUS at 07:20

## 2017-11-27 RX ADMIN — MIDAZOLAM HYDROCHLORIDE 2 MG: 1 INJECTION, SOLUTION INTRAMUSCULAR; INTRAVENOUS at 06:46

## 2017-11-27 RX ADMIN — HYDROMORPHONE HYDROCHLORIDE 1 MG: 1 INJECTION, SOLUTION INTRAMUSCULAR; INTRAVENOUS; SUBCUTANEOUS at 12:41

## 2017-11-27 RX ADMIN — OXYCODONE HYDROCHLORIDE AND ACETAMINOPHEN 2 TABLET: 10; 325 TABLET ORAL at 11:25

## 2017-11-27 RX ADMIN — ATORVASTATIN CALCIUM 10 MG: 10 TABLET, FILM COATED ORAL at 20:09

## 2017-11-27 RX ADMIN — Medication 3 MG: at 09:48

## 2017-11-27 RX ADMIN — ROCURONIUM BROMIDE 50 MG: 10 INJECTION INTRAVENOUS at 07:20

## 2017-11-27 RX ADMIN — AMLODIPINE BESYLATE 5 MG: 5 TABLET ORAL at 14:44

## 2017-11-27 RX ADMIN — OXYCODONE HYDROCHLORIDE 20 MG: 20 TABLET, FILM COATED, EXTENDED RELEASE ORAL at 06:01

## 2017-11-27 RX ADMIN — CEFAZOLIN SODIUM 1 G: 1 INJECTION, SOLUTION INTRAVENOUS at 23:17

## 2017-11-27 RX ADMIN — HYDROMORPHONE HYDROCHLORIDE 1 MG: 1 INJECTION, SOLUTION INTRAMUSCULAR; INTRAVENOUS; SUBCUTANEOUS at 21:40

## 2017-11-27 RX ADMIN — FAMOTIDINE 20 MG: 20 TABLET, FILM COATED ORAL at 14:44

## 2017-11-27 RX ADMIN — OXYCODONE HYDROCHLORIDE AND ACETAMINOPHEN 2 TABLET: 10; 325 TABLET ORAL at 16:28

## 2017-11-27 RX ADMIN — LIDOCAINE HYDROCHLORIDE 0.5 ML: 10 INJECTION, SOLUTION EPIDURAL; INFILTRATION; INTRACAUDAL; PERINEURAL at 05:57

## 2017-11-27 RX ADMIN — GABAPENTIN 600 MG: 300 CAPSULE ORAL at 21:40

## 2017-11-27 RX ADMIN — OXYCODONE HYDROCHLORIDE AND ACETAMINOPHEN 2 TABLET: 10; 325 TABLET ORAL at 20:28

## 2017-11-27 RX ADMIN — SERTRALINE 100 MG: 100 TABLET, FILM COATED ORAL at 14:44

## 2017-11-27 RX ADMIN — TIZANIDINE HYDROCHLORIDE 4 MG: 4 TABLET ORAL at 20:09

## 2017-11-27 RX ADMIN — ONDANSETRON 4 MG: 2 INJECTION INTRAMUSCULAR; INTRAVENOUS at 10:29

## 2017-11-27 RX ADMIN — IPRATROPIUM BROMIDE AND ALBUTEROL SULFATE 3 ML: 2.5; .5 SOLUTION RESPIRATORY (INHALATION) at 14:33

## 2017-11-27 RX ADMIN — TIZANIDINE HYDROCHLORIDE 4 MG: 4 TABLET ORAL at 11:41

## 2017-11-27 RX ADMIN — GLYCOPYRROLATE 0.3 MG: 0.2 INJECTION, SOLUTION INTRAMUSCULAR; INTRAVENOUS at 09:48

## 2017-11-27 RX ADMIN — HYDROMORPHONE HYDROCHLORIDE 1 MG: 1 INJECTION, SOLUTION INTRAMUSCULAR; INTRAVENOUS; SUBCUTANEOUS at 15:32

## 2017-11-27 RX ADMIN — SUFENTANIL CITRATE 25 MCG: 50 INJECTION, SOLUTION EPIDURAL; INTRAVENOUS at 08:10

## 2017-11-27 RX ADMIN — FAMOTIDINE 20 MG: 20 TABLET, FILM COATED ORAL at 21:40

## 2017-11-27 RX ADMIN — ROCURONIUM BROMIDE 20 MG: 10 INJECTION INTRAVENOUS at 08:09

## 2017-11-27 RX ADMIN — IPRATROPIUM BROMIDE AND ALBUTEROL SULFATE 3 ML: 2.5; .5 SOLUTION RESPIRATORY (INHALATION) at 20:32

## 2017-11-27 RX ADMIN — ONDANSETRON HYDROCHLORIDE 4 MG: 2 SOLUTION INTRAMUSCULAR; INTRAVENOUS at 09:30

## 2017-11-27 RX ADMIN — HYDROMORPHONE HYDROCHLORIDE 1 MG: 1 INJECTION, SOLUTION INTRAMUSCULAR; INTRAVENOUS; SUBCUTANEOUS at 10:29

## 2017-11-27 NOTE — ANESTHESIA PROCEDURE NOTES
Airway  Urgency: elective    Date/Time: 11/27/2017 7:28 AM  Airway not difficult    General Information and Staff    Patient location during procedure: OR  CRNA: LUCILLE WHELAN    Indications and Patient Condition  Indications for airway management: airway protection    Preoxygenated: yes  MILS maintained throughout  Mask difficulty assessment: 1 - vent by mask    Final Airway Details  Final airway type: endotracheal airway      Successful airway: ETT  Cuffed: yes   Successful intubation technique: direct laryngoscopy  Endotracheal tube insertion site: oral  Blade: Nieves  Blade size: #2  ETT size: 8.0 mm  Cormack-Lehane Classification: grade IIa - partial view of glottis  Placement verified by: chest auscultation and capnometry   Cuff volume (mL): 5  Measured from: gums  ETT to gums (cm): 21  Number of attempts at approach: 1    Additional Comments  Atraumatic

## 2017-11-27 NOTE — ANESTHESIA PREPROCEDURE EVALUATION
Anesthesia Evaluation     Patient summary reviewed   no history of anesthetic complications:  NPO Solid Status: > 8 hours  NPO Liquid Status: > 8 hours     Airway   Mallampati: II  TM distance: <3 FB  Neck ROM: full  Dental - normal exam     Pulmonary - normal exam   (-) COPD, asthma, sleep apnea, not a smoker  Cardiovascular - normal exam  Exercise tolerance: good (4-7 METS)    ECG reviewed    (+) hypertension, hyperlipidemia  (-) angina      Neuro/Psych  (-) seizures, TIA, CVA  GI/Hepatic/Renal/Endo    (+) obesity,  diabetes mellitus,   (-) liver disease, no renal disease    Musculoskeletal     Abdominal    Substance History      OB/GYN          Other                                      Anesthesia Plan    ASA 2     general     intravenous induction   Anesthetic plan and risks discussed with patient.

## 2017-11-27 NOTE — ANESTHESIA POSTPROCEDURE EVALUATION
Patient: Trevor Hernandez    Procedure Summary     Date Anesthesia Start Anesthesia Stop Room / Location    11/27/17 0717 1003 [facility] OR 10 / [facility] OR       Procedure Diagnosis Surgeon Provider    ANTERIOR DECOMPRESSION, ANTERIOR LUMBAR INTERBODY FUSION WITH INSTRUMENTATION L5-S1 (N/A Spine Lumbar); ANTERIOR LUMBAR EXPOSURE (N/A Abdomen) (M54.16) K Jeffery Lunsford MD; DO Antonio Mann CRNA          Anesthesia Type: general  Last vitals  BP   131/81 (11/27/17 1230)   Temp   98.3 °F (36.8 °C) (11/27/17 1230)   Pulse   107 (11/27/17 1230)   Resp   12 (11/27/17 1230)     SpO2   98 % (11/27/17 1230)     Post Anesthesia Care and Evaluation    Patient location during evaluation: PACU  Patient participation: complete - patient participated  Level of consciousness: awake and alert  Pain management: adequate  Airway patency: patent  Anesthetic complications: No anesthetic complications  PONV Status: none  Cardiovascular status: acceptable and hemodynamically stable  Respiratory status: acceptable  Hydration status: acceptable    Comments: Blood pressure 131/81, pulse 107, temperature 98.3 °F (36.8 °C), temperature source Axillary, resp. rate 12, SpO2 98 %.    Patient discharged from PACU based upon Luna score. Please see RN notes for further details

## 2017-11-28 LAB
ANION GAP SERPL CALCULATED.3IONS-SCNC: 11 MMOL/L (ref 4–13)
BASOPHILS # BLD AUTO: 0.03 10*3/MM3 (ref 0–0.2)
BASOPHILS NFR BLD AUTO: 0.3 % (ref 0–2)
BUN BLD-MCNC: 12 MG/DL (ref 5–21)
BUN/CREAT SERPL: 14.6 (ref 7–25)
CALCIUM SPEC-SCNC: 8.5 MG/DL (ref 8.4–10.4)
CHLORIDE SERPL-SCNC: 92 MMOL/L (ref 98–110)
CO2 SERPL-SCNC: 30 MMOL/L (ref 24–31)
CREAT BLD-MCNC: 0.82 MG/DL (ref 0.5–1.4)
DEPRECATED RDW RBC AUTO: 40.4 FL (ref 40–54)
EOSINOPHIL # BLD AUTO: 0.18 10*3/MM3 (ref 0–0.7)
EOSINOPHIL NFR BLD AUTO: 1.9 % (ref 0–4)
ERYTHROCYTE [DISTWIDTH] IN BLOOD BY AUTOMATED COUNT: 13 % (ref 12–15)
GFR SERPL CREATININE-BSD FRML MDRD: 104 ML/MIN/1.73
GLUCOSE BLD-MCNC: 238 MG/DL (ref 70–100)
HCT VFR BLD AUTO: 40.6 % (ref 40–52)
HGB BLD-MCNC: 13.7 G/DL (ref 14–18)
IMM GRANULOCYTES # BLD: 0.04 10*3/MM3 (ref 0–0.03)
IMM GRANULOCYTES NFR BLD: 0.4 % (ref 0–5)
LYMPHOCYTES # BLD AUTO: 1.91 10*3/MM3 (ref 0.72–4.86)
LYMPHOCYTES NFR BLD AUTO: 20.5 % (ref 15–45)
MCH RBC QN AUTO: 29 PG (ref 28–32)
MCHC RBC AUTO-ENTMCNC: 33.7 G/DL (ref 33–36)
MCV RBC AUTO: 85.8 FL (ref 82–95)
MONOCYTES # BLD AUTO: 0.96 10*3/MM3 (ref 0.19–1.3)
MONOCYTES NFR BLD AUTO: 10.3 % (ref 4–12)
NEUTROPHILS # BLD AUTO: 6.21 10*3/MM3 (ref 1.87–8.4)
NEUTROPHILS NFR BLD AUTO: 66.6 % (ref 39–78)
PLATELET # BLD AUTO: 218 10*3/MM3 (ref 130–400)
PMV BLD AUTO: 10.3 FL (ref 6–12)
POTASSIUM BLD-SCNC: 4.1 MMOL/L (ref 3.5–5.3)
RBC # BLD AUTO: 4.73 10*6/MM3 (ref 4.8–5.9)
SODIUM BLD-SCNC: 133 MMOL/L (ref 135–145)
WBC NRBC COR # BLD: 9.33 10*3/MM3 (ref 4.8–10.8)

## 2017-11-28 PROCEDURE — 85025 COMPLETE CBC W/AUTO DIFF WBC: CPT | Performed by: ORTHOPAEDIC SURGERY

## 2017-11-28 PROCEDURE — 25010000002 HYDROMORPHONE PER 4 MG: Performed by: ORTHOPAEDIC SURGERY

## 2017-11-28 PROCEDURE — G8978 MOBILITY CURRENT STATUS: HCPCS | Performed by: PHYSICAL THERAPIST

## 2017-11-28 PROCEDURE — 94799 UNLISTED PULMONARY SVC/PX: CPT

## 2017-11-28 PROCEDURE — 25010000003 CEFAZOLIN 1 GM/50ML SOLUTION: Performed by: ORTHOPAEDIC SURGERY

## 2017-11-28 PROCEDURE — G8980 MOBILITY D/C STATUS: HCPCS | Performed by: PHYSICAL THERAPIST

## 2017-11-28 PROCEDURE — 97161 PT EVAL LOW COMPLEX 20 MIN: CPT

## 2017-11-28 PROCEDURE — 25010000002 DEXAMETHASONE PER 1 MG: Performed by: PHYSICIAN ASSISTANT

## 2017-11-28 PROCEDURE — G8979 MOBILITY GOAL STATUS: HCPCS | Performed by: PHYSICAL THERAPIST

## 2017-11-28 PROCEDURE — 80048 BASIC METABOLIC PNL TOTAL CA: CPT | Performed by: ORTHOPAEDIC SURGERY

## 2017-11-28 RX ORDER — DEXAMETHASONE SODIUM PHOSPHATE 10 MG/ML
10 INJECTION INTRAMUSCULAR; INTRAVENOUS EVERY 8 HOURS
Status: COMPLETED | OUTPATIENT
Start: 2017-11-28 | End: 2017-11-28

## 2017-11-28 RX ORDER — GABAPENTIN 400 MG/1
1200 CAPSULE ORAL EVERY 8 HOURS SCHEDULED
Status: DISCONTINUED | OUTPATIENT
Start: 2017-11-28 | End: 2017-11-30 | Stop reason: HOSPADM

## 2017-11-28 RX ORDER — POLYETHYLENE GLYCOL 3350 17 G/17G
17 POWDER, FOR SOLUTION ORAL 2 TIMES DAILY
Status: DISCONTINUED | OUTPATIENT
Start: 2017-11-28 | End: 2017-11-30 | Stop reason: HOSPADM

## 2017-11-28 RX ORDER — ZOLPIDEM TARTRATE 5 MG/1
5 TABLET ORAL NIGHTLY PRN
Status: DISCONTINUED | OUTPATIENT
Start: 2017-11-28 | End: 2017-11-30 | Stop reason: HOSPADM

## 2017-11-28 RX ADMIN — DEXAMETHASONE SODIUM PHOSPHATE 10 MG: 10 INJECTION, SOLUTION INTRAMUSCULAR; INTRAVENOUS at 08:37

## 2017-11-28 RX ADMIN — GABAPENTIN 600 MG: 300 CAPSULE ORAL at 05:33

## 2017-11-28 RX ADMIN — IPRATROPIUM BROMIDE AND ALBUTEROL SULFATE 3 ML: 2.5; .5 SOLUTION RESPIRATORY (INHALATION) at 06:32

## 2017-11-28 RX ADMIN — ZOLPIDEM TARTRATE 5 MG: 5 TABLET, COATED ORAL at 23:14

## 2017-11-28 RX ADMIN — IPRATROPIUM BROMIDE AND ALBUTEROL SULFATE 3 ML: 2.5; .5 SOLUTION RESPIRATORY (INHALATION) at 14:47

## 2017-11-28 RX ADMIN — OXYCODONE HYDROCHLORIDE AND ACETAMINOPHEN 2 TABLET: 10; 325 TABLET ORAL at 20:05

## 2017-11-28 RX ADMIN — DEXAMETHASONE SODIUM PHOSPHATE 10 MG: 10 INJECTION, SOLUTION INTRAMUSCULAR; INTRAVENOUS at 15:46

## 2017-11-28 RX ADMIN — HYDROMORPHONE HYDROCHLORIDE 1 MG: 1 INJECTION, SOLUTION INTRAMUSCULAR; INTRAVENOUS; SUBCUTANEOUS at 14:05

## 2017-11-28 RX ADMIN — HYDROMORPHONE HYDROCHLORIDE 1 MG: 1 INJECTION, SOLUTION INTRAMUSCULAR; INTRAVENOUS; SUBCUTANEOUS at 18:30

## 2017-11-28 RX ADMIN — HYDROMORPHONE HYDROCHLORIDE 1 MG: 1 INJECTION, SOLUTION INTRAMUSCULAR; INTRAVENOUS; SUBCUTANEOUS at 07:50

## 2017-11-28 RX ADMIN — FAMOTIDINE 20 MG: 20 TABLET, FILM COATED ORAL at 20:05

## 2017-11-28 RX ADMIN — HYDROMORPHONE HYDROCHLORIDE 1 MG: 1 INJECTION, SOLUTION INTRAMUSCULAR; INTRAVENOUS; SUBCUTANEOUS at 00:29

## 2017-11-28 RX ADMIN — FAMOTIDINE 20 MG: 20 TABLET, FILM COATED ORAL at 08:37

## 2017-11-28 RX ADMIN — POLYETHYLENE GLYCOL (3350) 17 G: 17 POWDER, FOR SOLUTION ORAL at 08:56

## 2017-11-28 RX ADMIN — IPRATROPIUM BROMIDE AND ALBUTEROL SULFATE 3 ML: 2.5; .5 SOLUTION RESPIRATORY (INHALATION) at 19:25

## 2017-11-28 RX ADMIN — ATORVASTATIN CALCIUM 10 MG: 10 TABLET, FILM COATED ORAL at 20:05

## 2017-11-28 RX ADMIN — DEXAMETHASONE SODIUM PHOSPHATE 10 MG: 10 INJECTION, SOLUTION INTRAMUSCULAR; INTRAVENOUS at 23:58

## 2017-11-28 RX ADMIN — SERTRALINE 100 MG: 100 TABLET, FILM COATED ORAL at 08:37

## 2017-11-28 RX ADMIN — HYDROMORPHONE HYDROCHLORIDE 1 MG: 1 INJECTION, SOLUTION INTRAMUSCULAR; INTRAVENOUS; SUBCUTANEOUS at 03:35

## 2017-11-28 RX ADMIN — OXYCODONE HYDROCHLORIDE AND ACETAMINOPHEN 2 TABLET: 10; 325 TABLET ORAL at 06:45

## 2017-11-28 RX ADMIN — TIZANIDINE HYDROCHLORIDE 4 MG: 4 TABLET ORAL at 20:19

## 2017-11-28 RX ADMIN — OXYCODONE HYDROCHLORIDE AND ACETAMINOPHEN 2 TABLET: 10; 325 TABLET ORAL at 01:31

## 2017-11-28 RX ADMIN — HYDROMORPHONE HYDROCHLORIDE 1 MG: 1 INJECTION, SOLUTION INTRAMUSCULAR; INTRAVENOUS; SUBCUTANEOUS at 21:31

## 2017-11-28 RX ADMIN — OXYCODONE HYDROCHLORIDE AND ACETAMINOPHEN 2 TABLET: 10; 325 TABLET ORAL at 15:46

## 2017-11-28 RX ADMIN — TIZANIDINE HYDROCHLORIDE 4 MG: 4 TABLET ORAL at 12:12

## 2017-11-28 RX ADMIN — CEFAZOLIN SODIUM 1 G: 1 INJECTION, SOLUTION INTRAVENOUS at 06:45

## 2017-11-28 RX ADMIN — SODIUM CHLORIDE 75 ML/HR: 9 INJECTION, SOLUTION INTRAVENOUS at 00:56

## 2017-11-28 RX ADMIN — TIZANIDINE HYDROCHLORIDE 4 MG: 4 TABLET ORAL at 04:09

## 2017-11-28 RX ADMIN — GABAPENTIN 1200 MG: 400 CAPSULE ORAL at 21:31

## 2017-11-28 RX ADMIN — HYDROMORPHONE HYDROCHLORIDE 1 MG: 1 INJECTION, SOLUTION INTRAMUSCULAR; INTRAVENOUS; SUBCUTANEOUS at 05:33

## 2017-11-28 RX ADMIN — AMLODIPINE BESYLATE 5 MG: 5 TABLET ORAL at 08:37

## 2017-11-28 RX ADMIN — HYDROMORPHONE HYDROCHLORIDE 1 MG: 1 INJECTION, SOLUTION INTRAMUSCULAR; INTRAVENOUS; SUBCUTANEOUS at 16:29

## 2017-11-28 RX ADMIN — HYDROMORPHONE HYDROCHLORIDE 1 MG: 1 INJECTION, SOLUTION INTRAMUSCULAR; INTRAVENOUS; SUBCUTANEOUS at 12:12

## 2017-11-28 RX ADMIN — POLYETHYLENE GLYCOL (3350) 17 G: 17 POWDER, FOR SOLUTION ORAL at 17:28

## 2017-11-28 RX ADMIN — HYDROMORPHONE HYDROCHLORIDE 1 MG: 1 INJECTION, SOLUTION INTRAMUSCULAR; INTRAVENOUS; SUBCUTANEOUS at 10:13

## 2017-11-28 RX ADMIN — GABAPENTIN 1200 MG: 400 CAPSULE ORAL at 13:32

## 2017-11-28 RX ADMIN — OXYCODONE HYDROCHLORIDE AND ACETAMINOPHEN 2 TABLET: 10; 325 TABLET ORAL at 23:58

## 2017-11-28 RX ADMIN — OXYCODONE HYDROCHLORIDE AND ACETAMINOPHEN 2 TABLET: 10; 325 TABLET ORAL at 11:26

## 2017-11-29 ENCOUNTER — ANESTHESIA (OUTPATIENT)
Dept: PERIOP | Facility: HOSPITAL | Age: 42
End: 2017-11-29

## 2017-11-29 ENCOUNTER — APPOINTMENT (OUTPATIENT)
Dept: GENERAL RADIOLOGY | Facility: HOSPITAL | Age: 42
End: 2017-11-29

## 2017-11-29 ENCOUNTER — ANESTHESIA EVENT (OUTPATIENT)
Dept: PERIOP | Facility: HOSPITAL | Age: 42
End: 2017-11-29

## 2017-11-29 LAB
GLUCOSE BLDC GLUCOMTR-MCNC: 271 MG/DL (ref 70–130)
GLUCOSE BLDC GLUCOMTR-MCNC: 290 MG/DL (ref 70–130)
IRON 24H UR-MRATE: 29 MCG/DL (ref 42–180)
IRON SATN MFR SERPL: 10 % (ref 20–45)
TIBC SERPL-MCNC: 292 MCG/DL (ref 225–420)
VIT B12 BLD-MCNC: 804 PG/ML (ref 239–931)

## 2017-11-29 PROCEDURE — 94799 UNLISTED PULMONARY SVC/PX: CPT

## 2017-11-29 PROCEDURE — 25010000002 DEXAMETHASONE PER 1 MG: Performed by: NURSE ANESTHETIST, CERTIFIED REGISTERED

## 2017-11-29 PROCEDURE — 25010000002 ONDANSETRON PER 1 MG: Performed by: ANESTHESIOLOGY

## 2017-11-29 PROCEDURE — 94760 N-INVAS EAR/PLS OXIMETRY 1: CPT

## 2017-11-29 PROCEDURE — 72100 X-RAY EXAM L-S SPINE 2/3 VWS: CPT

## 2017-11-29 PROCEDURE — G8978 MOBILITY CURRENT STATUS: HCPCS | Performed by: PHYSICAL THERAPIST

## 2017-11-29 PROCEDURE — 25010000002 ONDANSETRON PER 1 MG: Performed by: NURSE ANESTHETIST, CERTIFIED REGISTERED

## 2017-11-29 PROCEDURE — C1713 ANCHOR/SCREW BN/BN,TIS/BN: HCPCS | Performed by: ORTHOPAEDIC SURGERY

## 2017-11-29 PROCEDURE — G8980 MOBILITY D/C STATUS: HCPCS | Performed by: PHYSICAL THERAPIST

## 2017-11-29 PROCEDURE — 82962 GLUCOSE BLOOD TEST: CPT

## 2017-11-29 PROCEDURE — 25010000002 HYDROMORPHONE PER 4 MG: Performed by: ORTHOPAEDIC SURGERY

## 2017-11-29 PROCEDURE — 83540 ASSAY OF IRON: CPT | Performed by: FAMILY MEDICINE

## 2017-11-29 PROCEDURE — 4A11X4G MONITORING OF PERIPHERAL NERVOUS ELECTRICAL ACTIVITY, INTRAOPERATIVE, EXTERNAL APPROACH: ICD-10-PCS | Performed by: ORTHOPAEDIC SURGERY

## 2017-11-29 PROCEDURE — 25010000002 PROPOFOL 10 MG/ML EMULSION: Performed by: NURSE ANESTHETIST, CERTIFIED REGISTERED

## 2017-11-29 PROCEDURE — 97168 OT RE-EVAL EST PLAN CARE: CPT

## 2017-11-29 PROCEDURE — G8989 SELF CARE D/C STATUS: HCPCS

## 2017-11-29 PROCEDURE — 25010000003 CEFAZOLIN PER 500 MG: Performed by: ORTHOPAEDIC SURGERY

## 2017-11-29 PROCEDURE — 76000 FLUOROSCOPY <1 HR PHYS/QHP: CPT

## 2017-11-29 PROCEDURE — 97164 PT RE-EVAL EST PLAN CARE: CPT

## 2017-11-29 PROCEDURE — 0SG3071 FUSION OF LUMBOSACRAL JOINT WITH AUTOLOGOUS TISSUE SUBSTITUTE, POSTERIOR APPROACH, POSTERIOR COLUMN, OPEN APPROACH: ICD-10-PCS | Performed by: ORTHOPAEDIC SURGERY

## 2017-11-29 PROCEDURE — 83550 IRON BINDING TEST: CPT | Performed by: FAMILY MEDICINE

## 2017-11-29 PROCEDURE — G8979 MOBILITY GOAL STATUS: HCPCS | Performed by: PHYSICAL THERAPIST

## 2017-11-29 PROCEDURE — G8987 SELF CARE CURRENT STATUS: HCPCS

## 2017-11-29 PROCEDURE — 25010000002 MIDAZOLAM PER 1 MG: Performed by: ANESTHESIOLOGY

## 2017-11-29 PROCEDURE — 25010000002 HYDROMORPHONE PER 4 MG: Performed by: ANESTHESIOLOGY

## 2017-11-29 PROCEDURE — 82607 VITAMIN B-12: CPT | Performed by: FAMILY MEDICINE

## 2017-11-29 PROCEDURE — 25010000002 SUCCINYLCHOLINE PER 20 MG: Performed by: NURSE ANESTHETIST, CERTIFIED REGISTERED

## 2017-11-29 PROCEDURE — G8988 SELF CARE GOAL STATUS: HCPCS

## 2017-11-29 DEVICE — IMPLANTABLE DEVICE: Type: IMPLANTABLE DEVICE | Status: FUNCTIONAL

## 2017-11-29 DEVICE — SCRW ST EVEREST PA TI: Type: IMPLANTABLE DEVICE | Status: FUNCTIONAL

## 2017-11-29 RX ORDER — SUCCINYLCHOLINE CHLORIDE 20 MG/ML
INJECTION INTRAMUSCULAR; INTRAVENOUS AS NEEDED
Status: DISCONTINUED | OUTPATIENT
Start: 2017-11-29 | End: 2017-11-29 | Stop reason: SURG

## 2017-11-29 RX ORDER — SUFENTANIL CITRATE 50 UG/ML
INJECTION EPIDURAL; INTRAVENOUS AS NEEDED
Status: DISCONTINUED | OUTPATIENT
Start: 2017-11-29 | End: 2017-11-29 | Stop reason: SURG

## 2017-11-29 RX ORDER — MIDAZOLAM HYDROCHLORIDE 1 MG/ML
2 INJECTION INTRAMUSCULAR; INTRAVENOUS
Status: DISCONTINUED | OUTPATIENT
Start: 2017-11-29 | End: 2017-11-29 | Stop reason: HOSPADM

## 2017-11-29 RX ORDER — PROPOFOL 10 MG/ML
VIAL (ML) INTRAVENOUS AS NEEDED
Status: DISCONTINUED | OUTPATIENT
Start: 2017-11-29 | End: 2017-11-29 | Stop reason: SURG

## 2017-11-29 RX ORDER — IPRATROPIUM BROMIDE AND ALBUTEROL SULFATE 2.5; .5 MG/3ML; MG/3ML
3 SOLUTION RESPIRATORY (INHALATION) ONCE AS NEEDED
Status: DISCONTINUED | OUTPATIENT
Start: 2017-11-29 | End: 2017-11-29 | Stop reason: HOSPADM

## 2017-11-29 RX ORDER — LIDOCAINE HYDROCHLORIDE 20 MG/ML
INJECTION, SOLUTION INFILTRATION; PERINEURAL AS NEEDED
Status: DISCONTINUED | OUTPATIENT
Start: 2017-11-29 | End: 2017-11-29 | Stop reason: SURG

## 2017-11-29 RX ORDER — MEPERIDINE HYDROCHLORIDE 25 MG/ML
12.5 INJECTION INTRAMUSCULAR; INTRAVENOUS; SUBCUTANEOUS
Status: DISCONTINUED | OUTPATIENT
Start: 2017-11-29 | End: 2017-11-29 | Stop reason: HOSPADM

## 2017-11-29 RX ORDER — FAMOTIDINE 10 MG/ML
20 INJECTION, SOLUTION INTRAVENOUS
Status: DISCONTINUED | OUTPATIENT
Start: 2017-11-29 | End: 2017-11-29 | Stop reason: HOSPADM

## 2017-11-29 RX ORDER — LABETALOL HYDROCHLORIDE 5 MG/ML
5 INJECTION, SOLUTION INTRAVENOUS
Status: DISCONTINUED | OUTPATIENT
Start: 2017-11-29 | End: 2017-11-29 | Stop reason: HOSPADM

## 2017-11-29 RX ORDER — HYDRALAZINE HYDROCHLORIDE 20 MG/ML
5 INJECTION INTRAMUSCULAR; INTRAVENOUS
Status: DISCONTINUED | OUTPATIENT
Start: 2017-11-29 | End: 2017-11-29 | Stop reason: HOSPADM

## 2017-11-29 RX ORDER — SODIUM CHLORIDE 0.9 % (FLUSH) 0.9 %
1-10 SYRINGE (ML) INJECTION AS NEEDED
Status: DISCONTINUED | OUTPATIENT
Start: 2017-11-29 | End: 2017-11-30 | Stop reason: HOSPADM

## 2017-11-29 RX ORDER — FENTANYL CITRATE 50 UG/ML
25 INJECTION, SOLUTION INTRAMUSCULAR; INTRAVENOUS
Status: DISCONTINUED | OUTPATIENT
Start: 2017-11-29 | End: 2017-11-29 | Stop reason: HOSPADM

## 2017-11-29 RX ORDER — SODIUM CHLORIDE, SODIUM LACTATE, POTASSIUM CHLORIDE, CALCIUM CHLORIDE 600; 310; 30; 20 MG/100ML; MG/100ML; MG/100ML; MG/100ML
9 INJECTION, SOLUTION INTRAVENOUS CONTINUOUS
Status: DISCONTINUED | OUTPATIENT
Start: 2017-11-29 | End: 2017-11-29 | Stop reason: SDUPTHER

## 2017-11-29 RX ORDER — ONDANSETRON 2 MG/ML
INJECTION INTRAMUSCULAR; INTRAVENOUS AS NEEDED
Status: DISCONTINUED | OUTPATIENT
Start: 2017-11-29 | End: 2017-11-29 | Stop reason: SURG

## 2017-11-29 RX ORDER — MIDAZOLAM HYDROCHLORIDE 1 MG/ML
1 INJECTION INTRAMUSCULAR; INTRAVENOUS
Status: DISCONTINUED | OUTPATIENT
Start: 2017-11-29 | End: 2017-11-29 | Stop reason: HOSPADM

## 2017-11-29 RX ORDER — DEXTROSE MONOHYDRATE 25 G/50ML
12.5 INJECTION, SOLUTION INTRAVENOUS AS NEEDED
Status: DISCONTINUED | OUTPATIENT
Start: 2017-11-29 | End: 2017-11-29 | Stop reason: HOSPADM

## 2017-11-29 RX ORDER — DEXAMETHASONE SODIUM PHOSPHATE 4 MG/ML
INJECTION, SOLUTION INTRA-ARTICULAR; INTRALESIONAL; INTRAMUSCULAR; INTRAVENOUS; SOFT TISSUE AS NEEDED
Status: DISCONTINUED | OUTPATIENT
Start: 2017-11-29 | End: 2017-11-29 | Stop reason: SURG

## 2017-11-29 RX ORDER — MAGNESIUM HYDROXIDE 1200 MG/15ML
LIQUID ORAL AS NEEDED
Status: DISCONTINUED | OUTPATIENT
Start: 2017-11-29 | End: 2017-11-29 | Stop reason: HOSPADM

## 2017-11-29 RX ORDER — SODIUM CHLORIDE 9 MG/ML
75 INJECTION, SOLUTION INTRAVENOUS CONTINUOUS
Status: DISCONTINUED | OUTPATIENT
Start: 2017-11-29 | End: 2017-11-30 | Stop reason: HOSPADM

## 2017-11-29 RX ORDER — DIPHENHYDRAMINE HYDROCHLORIDE 50 MG/ML
12.5 INJECTION INTRAMUSCULAR; INTRAVENOUS
Status: DISCONTINUED | OUTPATIENT
Start: 2017-11-29 | End: 2017-11-29 | Stop reason: HOSPADM

## 2017-11-29 RX ORDER — SODIUM CHLORIDE 0.9 % (FLUSH) 0.9 %
1-10 SYRINGE (ML) INJECTION AS NEEDED
Status: DISCONTINUED | OUTPATIENT
Start: 2017-11-29 | End: 2017-11-29 | Stop reason: HOSPADM

## 2017-11-29 RX ORDER — ROCURONIUM BROMIDE 10 MG/ML
INJECTION, SOLUTION INTRAVENOUS AS NEEDED
Status: DISCONTINUED | OUTPATIENT
Start: 2017-11-29 | End: 2017-11-29 | Stop reason: SURG

## 2017-11-29 RX ORDER — LIDOCAINE HYDROCHLORIDE 40 MG/ML
SOLUTION TOPICAL AS NEEDED
Status: DISCONTINUED | OUTPATIENT
Start: 2017-11-29 | End: 2017-11-29 | Stop reason: SURG

## 2017-11-29 RX ORDER — ONDANSETRON 2 MG/ML
4 INJECTION INTRAMUSCULAR; INTRAVENOUS ONCE AS NEEDED
Status: COMPLETED | OUTPATIENT
Start: 2017-11-29 | End: 2017-11-29

## 2017-11-29 RX ORDER — NALOXONE HCL 0.4 MG/ML
0.4 VIAL (ML) INJECTION AS NEEDED
Status: DISCONTINUED | OUTPATIENT
Start: 2017-11-29 | End: 2017-11-29 | Stop reason: HOSPADM

## 2017-11-29 RX ORDER — SODIUM CHLORIDE, SODIUM LACTATE, POTASSIUM CHLORIDE, CALCIUM CHLORIDE 600; 310; 30; 20 MG/100ML; MG/100ML; MG/100ML; MG/100ML
20 INJECTION, SOLUTION INTRAVENOUS CONTINUOUS
Status: DISCONTINUED | OUTPATIENT
Start: 2017-11-29 | End: 2017-11-29 | Stop reason: SDUPTHER

## 2017-11-29 RX ORDER — MORPHINE SULFATE 2 MG/ML
2 INJECTION, SOLUTION INTRAMUSCULAR; INTRAVENOUS
Status: DISCONTINUED | OUTPATIENT
Start: 2017-11-29 | End: 2017-11-29 | Stop reason: HOSPADM

## 2017-11-29 RX ADMIN — ONDANSETRON 4 MG: 2 INJECTION INTRAMUSCULAR; INTRAVENOUS at 08:40

## 2017-11-29 RX ADMIN — HYDROMORPHONE HYDROCHLORIDE 1 MG: 1 INJECTION, SOLUTION INTRAMUSCULAR; INTRAVENOUS; SUBCUTANEOUS at 12:23

## 2017-11-29 RX ADMIN — SUCCINYLCHOLINE CHLORIDE 160 MG: 20 INJECTION, SOLUTION INTRAMUSCULAR; INTRAVENOUS at 07:10

## 2017-11-29 RX ADMIN — MIDAZOLAM HYDROCHLORIDE 2 MG: 1 INJECTION, SOLUTION INTRAMUSCULAR; INTRAVENOUS at 06:54

## 2017-11-29 RX ADMIN — OXYCODONE HYDROCHLORIDE AND ACETAMINOPHEN 2 TABLET: 10; 325 TABLET ORAL at 23:24

## 2017-11-29 RX ADMIN — GABAPENTIN 1200 MG: 400 CAPSULE ORAL at 13:11

## 2017-11-29 RX ADMIN — LIDOCAINE HYDROCHLORIDE 100 MG: 20 INJECTION, SOLUTION INFILTRATION; PERINEURAL at 07:10

## 2017-11-29 RX ADMIN — HYDROMORPHONE HYDROCHLORIDE 1 MG: 1 INJECTION, SOLUTION INTRAMUSCULAR; INTRAVENOUS; SUBCUTANEOUS at 21:03

## 2017-11-29 RX ADMIN — ROCURONIUM BROMIDE 25 MG: 10 INJECTION INTRAVENOUS at 07:17

## 2017-11-29 RX ADMIN — FAMOTIDINE 20 MG: 20 TABLET, FILM COATED ORAL at 09:53

## 2017-11-29 RX ADMIN — OXYCODONE HYDROCHLORIDE AND ACETAMINOPHEN 2 TABLET: 10; 325 TABLET ORAL at 11:12

## 2017-11-29 RX ADMIN — SUFENTANIL CITRATE 50 MCG: 50 INJECTION, SOLUTION EPIDURAL; INTRAVENOUS at 07:10

## 2017-11-29 RX ADMIN — IPRATROPIUM BROMIDE AND ALBUTEROL SULFATE 3 ML: 2.5; .5 SOLUTION RESPIRATORY (INHALATION) at 12:14

## 2017-11-29 RX ADMIN — SODIUM CHLORIDE, POTASSIUM CHLORIDE, SODIUM LACTATE AND CALCIUM CHLORIDE 20 ML/HR: 600; 310; 30; 20 INJECTION, SOLUTION INTRAVENOUS at 06:58

## 2017-11-29 RX ADMIN — HYDROMORPHONE HYDROCHLORIDE 1 MG: 1 INJECTION, SOLUTION INTRAMUSCULAR; INTRAVENOUS; SUBCUTANEOUS at 14:18

## 2017-11-29 RX ADMIN — TIZANIDINE HYDROCHLORIDE 4 MG: 4 TABLET ORAL at 12:30

## 2017-11-29 RX ADMIN — DEXAMETHASONE SODIUM PHOSPHATE 4 MG: 4 INJECTION, SOLUTION INTRAMUSCULAR; INTRAVENOUS at 07:31

## 2017-11-29 RX ADMIN — FAMOTIDINE 20 MG: 20 TABLET, FILM COATED ORAL at 21:14

## 2017-11-29 RX ADMIN — SODIUM CHLORIDE, POTASSIUM CHLORIDE, SODIUM LACTATE AND CALCIUM CHLORIDE 9 ML/HR: 600; 310; 30; 20 INJECTION, SOLUTION INTRAVENOUS at 06:54

## 2017-11-29 RX ADMIN — POLYETHYLENE GLYCOL (3350) 17 G: 17 POWDER, FOR SOLUTION ORAL at 17:37

## 2017-11-29 RX ADMIN — OXYCODONE HYDROCHLORIDE AND ACETAMINOPHEN 2 TABLET: 10; 325 TABLET ORAL at 19:21

## 2017-11-29 RX ADMIN — IPRATROPIUM BROMIDE AND ALBUTEROL SULFATE 3 ML: 2.5; .5 SOLUTION RESPIRATORY (INHALATION) at 19:24

## 2017-11-29 RX ADMIN — GABAPENTIN 1200 MG: 400 CAPSULE ORAL at 21:02

## 2017-11-29 RX ADMIN — ROCURONIUM BROMIDE 5 MG: 10 INJECTION INTRAVENOUS at 07:10

## 2017-11-29 RX ADMIN — HYDROMORPHONE HYDROCHLORIDE 1 MG: 1 INJECTION, SOLUTION INTRAMUSCULAR; INTRAVENOUS; SUBCUTANEOUS at 05:30

## 2017-11-29 RX ADMIN — SODIUM CHLORIDE 75 ML/HR: 9 INJECTION, SOLUTION INTRAVENOUS at 09:53

## 2017-11-29 RX ADMIN — TIZANIDINE HYDROCHLORIDE 4 MG: 4 TABLET ORAL at 21:02

## 2017-11-29 RX ADMIN — IPRATROPIUM BROMIDE AND ALBUTEROL SULFATE 3 ML: 2.5; .5 SOLUTION RESPIRATORY (INHALATION) at 16:07

## 2017-11-29 RX ADMIN — HYDROMORPHONE HYDROCHLORIDE 1 MG: 1 INJECTION, SOLUTION INTRAMUSCULAR; INTRAVENOUS; SUBCUTANEOUS at 08:40

## 2017-11-29 RX ADMIN — PROPOFOL 200 MG: 10 INJECTION, EMULSION INTRAVENOUS at 07:10

## 2017-11-29 RX ADMIN — LIDOCAINE HYDROCHLORIDE 1 EACH: 40 SOLUTION TOPICAL at 07:10

## 2017-11-29 RX ADMIN — ONDANSETRON HYDROCHLORIDE 4 MG: 2 SOLUTION INTRAMUSCULAR; INTRAVENOUS at 07:31

## 2017-11-29 RX ADMIN — HYDROMORPHONE HYDROCHLORIDE 1 MG: 1 INJECTION, SOLUTION INTRAMUSCULAR; INTRAVENOUS; SUBCUTANEOUS at 16:34

## 2017-11-29 RX ADMIN — CEFAZOLIN SODIUM 2 G: 2 SOLUTION INTRAVENOUS at 07:15

## 2017-11-29 RX ADMIN — AMLODIPINE BESYLATE 5 MG: 5 TABLET ORAL at 09:53

## 2017-11-29 RX ADMIN — FAMOTIDINE 20 MG: 10 INJECTION, SOLUTION INTRAVENOUS at 06:54

## 2017-11-29 RX ADMIN — HYDROMORPHONE HYDROCHLORIDE 1 MG: 1 INJECTION, SOLUTION INTRAMUSCULAR; INTRAVENOUS; SUBCUTANEOUS at 03:31

## 2017-11-29 RX ADMIN — OXYCODONE HYDROCHLORIDE AND ACETAMINOPHEN 2 TABLET: 10; 325 TABLET ORAL at 15:35

## 2017-11-29 RX ADMIN — HYDROMORPHONE HYDROCHLORIDE 1 MG: 1 INJECTION, SOLUTION INTRAMUSCULAR; INTRAVENOUS; SUBCUTANEOUS at 18:26

## 2017-11-29 RX ADMIN — HYDROMORPHONE HYDROCHLORIDE 1 MG: 1 INJECTION, SOLUTION INTRAMUSCULAR; INTRAVENOUS; SUBCUTANEOUS at 10:15

## 2017-11-29 RX ADMIN — LIDOCAINE HYDROCHLORIDE 0.5 ML: 10 INJECTION, SOLUTION EPIDURAL; INFILTRATION; INTRACAUDAL; PERINEURAL at 06:54

## 2017-11-29 RX ADMIN — ATORVASTATIN CALCIUM 10 MG: 10 TABLET, FILM COATED ORAL at 21:14

## 2017-11-29 NOTE — ANESTHESIA POSTPROCEDURE EVALUATION
"Patient: Trevor Hernandez    Procedure Summary     Date Anesthesia Start Anesthesia Stop Room / Location    11/29/17 0706 0820  [facility] OR 05 / [facility] OR       Procedure Diagnosis Surgeon Provider    POSTERIOR SPINAL FUSION WITH INSTRUMENTATION L5-S1 (N/A Spine Lumbar) (M54.16) MD Rhea Watson CRNA          Anesthesia Type: general  Last vitals  BP   144/78 (11/29/17 1000)   Temp   96.9 °F (36.1 °C) (11/29/17 1000)   Pulse   98 (11/29/17 1000)   Resp   16 (11/29/17 1000)     SpO2   97 % (11/29/17 1000)     Post Anesthesia Care and Evaluation    Patient location during evaluation: PACU  Patient participation: complete - patient participated  Level of consciousness: awake and alert  Pain management: adequate  Airway patency: patent  Anesthetic complications: No anesthetic complications  PONV Status: none  Cardiovascular status: acceptable and hemodynamically stable  Respiratory status: acceptable  Hydration status: acceptable    Comments: Blood pressure 144/78, pulse 98, temperature 96.9 °F (36.1 °C), temperature source Oral, resp. rate 16, height 70\" (177.8 cm), weight 276 lb 11.2 oz (126 kg), SpO2 97 %.    Patient discharged from PACU based upon Luna score. Please see RN notes for further details      "

## 2017-11-29 NOTE — ANESTHESIA PROCEDURE NOTES
Airway  Urgency: elective    Airway not difficult    General Information and Staff    Patient location during procedure: OR  CRNA: HIPOLITO PRO    Indications and Patient Condition  Indications for airway management: airway protection    Preoxygenated: yes  MILS maintained throughout  Mask difficulty assessment: 1 - vent by mask    Final Airway Details  Final airway type: endotracheal airway      Successful airway: ETT  Cuffed: yes   Successful intubation technique: direct laryngoscopy  Facilitating devices/methods: intubating stylet  Endotracheal tube insertion site: oral  Blade: Nieves  Blade size: #2  ETT size: 7.0 mm  Cormack-Lehane Classification: grade I - full view of glottis  Placement verified by: chest auscultation, capnometry and palpation of cuff   Cuff volume (mL): 8  Measured from: teeth  ETT to teeth (cm): 21  Number of attempts at approach: 1

## 2017-11-29 NOTE — ANESTHESIA PREPROCEDURE EVALUATION
Anesthesia Evaluation     Patient summary reviewed   no history of anesthetic complications:  NPO Solid Status: > 8 hours       Airway   Mallampati: II  TM distance: <3 FB  Neck ROM: full  Dental - normal exam     Pulmonary - normal exam   (-) COPD, asthma, sleep apnea, not a smoker  Cardiovascular - normal exam  Exercise tolerance: good (4-7 METS)    ECG reviewed    (+) hypertension, hyperlipidemia  (-) angina      Neuro/Psych  (-) seizures, TIA, CVA  GI/Hepatic/Renal/Endo    (+) obesity, morbid obesity, diabetes mellitus,   (-) liver disease, no renal disease    Musculoskeletal     Abdominal    Substance History      OB/GYN          Other                                      Anesthesia Plan    ASA 3     general     intravenous induction   Anesthetic plan and risks discussed with patient.

## 2017-11-30 VITALS
WEIGHT: 276.7 LBS | BODY MASS INDEX: 39.61 KG/M2 | SYSTOLIC BLOOD PRESSURE: 125 MMHG | HEIGHT: 70 IN | HEART RATE: 77 BPM | DIASTOLIC BLOOD PRESSURE: 70 MMHG | TEMPERATURE: 97.6 F | RESPIRATION RATE: 14 BRPM | OXYGEN SATURATION: 97 %

## 2017-11-30 LAB
ANION GAP SERPL CALCULATED.3IONS-SCNC: 8 MMOL/L (ref 4–13)
BASOPHILS # BLD AUTO: 0.02 10*3/MM3 (ref 0–0.2)
BASOPHILS NFR BLD AUTO: 0.2 % (ref 0–2)
BUN BLD-MCNC: 16 MG/DL (ref 5–21)
BUN/CREAT SERPL: 22.5 (ref 7–25)
CALCIUM SPEC-SCNC: 8.5 MG/DL (ref 8.4–10.4)
CHLORIDE SERPL-SCNC: 95 MMOL/L (ref 98–110)
CO2 SERPL-SCNC: 33 MMOL/L (ref 24–31)
CREAT BLD-MCNC: 0.71 MG/DL (ref 0.5–1.4)
DEPRECATED RDW RBC AUTO: 38 FL (ref 40–54)
EOSINOPHIL # BLD AUTO: 0.01 10*3/MM3 (ref 0–0.7)
EOSINOPHIL NFR BLD AUTO: 0.1 % (ref 0–4)
ERYTHROCYTE [DISTWIDTH] IN BLOOD BY AUTOMATED COUNT: 12.3 % (ref 12–15)
GFR SERPL CREATININE-BSD FRML MDRD: 122 ML/MIN/1.73
GLUCOSE BLD-MCNC: 261 MG/DL (ref 70–100)
HCT VFR BLD AUTO: 32.5 % (ref 40–52)
HGB BLD-MCNC: 10.9 G/DL (ref 14–18)
IMM GRANULOCYTES # BLD: 0.08 10*3/MM3 (ref 0–0.03)
IMM GRANULOCYTES NFR BLD: 0.7 % (ref 0–5)
LYMPHOCYTES # BLD AUTO: 1.92 10*3/MM3 (ref 0.72–4.86)
LYMPHOCYTES NFR BLD AUTO: 17.2 % (ref 15–45)
MCH RBC QN AUTO: 28.8 PG (ref 28–32)
MCHC RBC AUTO-ENTMCNC: 33.5 G/DL (ref 33–36)
MCV RBC AUTO: 85.8 FL (ref 82–95)
MONOCYTES # BLD AUTO: 0.8 10*3/MM3 (ref 0.19–1.3)
MONOCYTES NFR BLD AUTO: 7.2 % (ref 4–12)
NEUTROPHILS # BLD AUTO: 8.34 10*3/MM3 (ref 1.87–8.4)
NEUTROPHILS NFR BLD AUTO: 74.6 % (ref 39–78)
NRBC BLD MANUAL-RTO: 0 /100 WBC (ref 0–0)
PLATELET # BLD AUTO: 229 10*3/MM3 (ref 130–400)
PMV BLD AUTO: 10.1 FL (ref 6–12)
POTASSIUM BLD-SCNC: 4.3 MMOL/L (ref 3.5–5.3)
RBC # BLD AUTO: 3.79 10*6/MM3 (ref 4.8–5.9)
SODIUM BLD-SCNC: 136 MMOL/L (ref 135–145)
WBC NRBC COR # BLD: 11.17 10*3/MM3 (ref 4.8–10.8)

## 2017-11-30 PROCEDURE — 94799 UNLISTED PULMONARY SVC/PX: CPT

## 2017-11-30 PROCEDURE — 80048 BASIC METABOLIC PNL TOTAL CA: CPT | Performed by: PHYSICIAN ASSISTANT

## 2017-11-30 PROCEDURE — 85025 COMPLETE CBC W/AUTO DIFF WBC: CPT | Performed by: PHYSICIAN ASSISTANT

## 2017-11-30 PROCEDURE — 25010000002 HYDROMORPHONE PER 4 MG: Performed by: ORTHOPAEDIC SURGERY

## 2017-11-30 RX ORDER — OXYCODONE AND ACETAMINOPHEN 10; 325 MG/1; MG/1
2 TABLET ORAL EVERY 4 HOURS PRN
Start: 2017-11-30 | End: 2017-12-07 | Stop reason: SDUPTHER

## 2017-11-30 RX ORDER — GABAPENTIN 400 MG/1
1200 CAPSULE ORAL EVERY 8 HOURS SCHEDULED
Start: 2017-11-30 | End: 2017-12-07 | Stop reason: DRUGHIGH

## 2017-11-30 RX ORDER — TIZANIDINE 4 MG/1
4 TABLET ORAL EVERY 8 HOURS PRN
Start: 2017-11-30 | End: 2022-04-15

## 2017-11-30 RX ADMIN — POLYETHYLENE GLYCOL (3350) 17 G: 17 POWDER, FOR SOLUTION ORAL at 08:21

## 2017-11-30 RX ADMIN — SERTRALINE 100 MG: 100 TABLET, FILM COATED ORAL at 08:21

## 2017-11-30 RX ADMIN — HYDROMORPHONE HYDROCHLORIDE 1 MG: 1 INJECTION, SOLUTION INTRAMUSCULAR; INTRAVENOUS; SUBCUTANEOUS at 07:05

## 2017-11-30 RX ADMIN — OXYCODONE HYDROCHLORIDE AND ACETAMINOPHEN 2 TABLET: 10; 325 TABLET ORAL at 09:51

## 2017-11-30 RX ADMIN — GABAPENTIN 1200 MG: 400 CAPSULE ORAL at 05:57

## 2017-11-30 RX ADMIN — HYDROMORPHONE HYDROCHLORIDE 1 MG: 1 INJECTION, SOLUTION INTRAMUSCULAR; INTRAVENOUS; SUBCUTANEOUS at 02:46

## 2017-11-30 RX ADMIN — OXYCODONE HYDROCHLORIDE AND ACETAMINOPHEN 2 TABLET: 10; 325 TABLET ORAL at 05:57

## 2017-11-30 RX ADMIN — FAMOTIDINE 20 MG: 20 TABLET, FILM COATED ORAL at 08:21

## 2017-11-30 RX ADMIN — IPRATROPIUM BROMIDE AND ALBUTEROL SULFATE 3 ML: 2.5; .5 SOLUTION RESPIRATORY (INHALATION) at 06:28

## 2017-11-30 RX ADMIN — AMLODIPINE BESYLATE 5 MG: 5 TABLET ORAL at 08:21

## 2017-12-01 ENCOUNTER — TRANSITIONAL CARE MANAGEMENT TELEPHONE ENCOUNTER (OUTPATIENT)
Dept: FAMILY MEDICINE CLINIC | Facility: CLINIC | Age: 42
End: 2017-12-01

## 2017-12-01 NOTE — PAYOR COMM NOTE
"MT HOME 11-30-17  [id number]    Trevor Harkins (42 y.o. Male)     Date of Birth Social Security Number Address Home Phone MRN    1975  [address] [place] [address] [phone] [id number]    Sabianist Marital Status          Mormonism        Admission Date Admission Type Admitting Provider Attending Provider Department, Room/Bed    11/27/17 Elective SHERI Lunsford MD  [facility] 3A, 327/1    Discharge Date Discharge Disposition Discharge Destination        11/30/2017 Home or Self Care             Attending Provider: (none)    Allergies:  Ambien [Zolpidem Tartrate]    Isolation:  None   Infection:  None   Code Status:  Prior    Ht:  70\" (177.8 cm)   Wt:  276 lb 11.2 oz (126 kg)    Admission Cmt:  None   Principal Problem:  None                Active Insurance as of 11/27/2017     Primary Coverage     Payor Plan Insurance Group Employer/Plan Group    ANTH [employer] ANTH [employer]WAYS PPO [id number]     Payor Plan Address Payor Plan Phone Number Effective From Effective To    [id number]  1/1/2017     [place] [address]       Subscriber Name Subscriber Birth Date Member ID       TREVOR HARKINS 1975 [id number]                 Emergency Contacts      (Rel.) Home Phone Work Phone Mobile Phone    Verónica Harkins (Spouse) [phone] -- --               Discharge Summary      Clarence Umana PA-C at 11/30/2017  7:44 AM            Date of Discharge:  11/30/2017    Admission Diagnosis: M54.16    Discharge Diagnosis:   Lumbar stenosis    Consults During Admission: Dr. Fajardo    Hospital Course  Patient is a 42 y.o. male Known to our practice. Admitted for the above fusion.  This has been well tolerated and the patient will be discharged home today in good stable condition with instructions for brace when out of bed.  No driving until directed.  Patient will follow-up with Dr. Lunsford's clinic in two weeks. They will call if problems arise. "       Condition on Discharge:  STABLE    Vital Signs  Temp:  [96.3 °F (35.7 °C)-98.3 °F (36.8 °C)] 97.6 °F (36.4 °C)  Heart Rate:  [] 77  Resp:  [12-20] 14  BP: (112-147)/(62-88) 125/70    Physical Exam:   Alert and oriented ×3, no acute distress, grossly neurovascularly intact, vital signs stable, dressing clean dry and intact, moving all extremities without focal deficit    Discharge Disposition  Home or Self Care    Discharge Medications   Canaan Migueldiana Nyjose   Home Medication Instructions RUBIO:[id number]    Printed on:11/30/17 0744   Medication Information                      amLODIPine (NORVASC) 5 MG tablet  Take 1 tablet by mouth Daily.             aspirin 81 MG chewable tablet  Chew 1 tablet Daily.             atorvastatin (LIPITOR) 10 MG tablet  Take 1 tablet by mouth Daily.             Dulaglutide 0.75 MG/0.5ML solution pen-injector  Inject 0.75 mg under the skin 1 (One) Time Per Week. Alternatives,  tanzeum 30 mg weekly,  victoza 1.2 mg daily , bydureon 2mg weekly             eszopiclone (LUNESTA) 2 MG tablet  TAKE 1/2 TO 1 TABLET AT BEDTIME             gabapentin (NEURONTIN) 400 MG capsule  Take 3 capsules by mouth Every 8 (Eight) Hours.             gabapentin (NEURONTIN) 600 MG tablet  Take 2 tablets three times daily             Insulin Pen Needle (PEN NEEDLES) 31G X 8 MM misc  use as indicated 4 times daily.             oxyCODONE-acetaminophen (PERCOCET)  MG per tablet  Take 2 tablets by mouth Every 4 (Four) Hours As Needed for Severe Pain  for up to 7 days.             sertraline (ZOLOFT) 100 MG tablet  Take 1 tablet by mouth Daily.             tiZANidine (ZANAFLEX) 4 MG tablet  Take 1 tablet by mouth Every 8 (Eight) Hours As Needed for Muscle Spasms.                 Discharge Diet: Resume Home diet, advance as tolerated    Activity at Discharge: Resume home activity advace as tolerated, no lifting, no twisting, no bending, brace as directed, no driving until directed.     Follow-up  Appointments  Followup with PCP within one week  Followup [facility] at 2weeks post-op         Clarence Umana PA-C  11/30/17  7:44 AM               Electronically signed by Clarence Umana PA-C at 11/30/2017  7:45 AM

## 2017-12-07 ENCOUNTER — OFFICE VISIT (OUTPATIENT)
Dept: FAMILY MEDICINE CLINIC | Facility: CLINIC | Age: 42
End: 2017-12-07

## 2017-12-07 VITALS
OXYGEN SATURATION: 100 % | DIASTOLIC BLOOD PRESSURE: 82 MMHG | HEIGHT: 70 IN | BODY MASS INDEX: 38.85 KG/M2 | HEART RATE: 96 BPM | TEMPERATURE: 97.7 F | WEIGHT: 271.4 LBS | SYSTOLIC BLOOD PRESSURE: 130 MMHG

## 2017-12-07 DIAGNOSIS — G89.29 CHRONIC BILATERAL LOW BACK PAIN, WITH SCIATICA PRESENCE UNSPECIFIED: Primary | ICD-10-CM

## 2017-12-07 DIAGNOSIS — M54.5 CHRONIC BILATERAL LOW BACK PAIN, WITH SCIATICA PRESENCE UNSPECIFIED: Primary | ICD-10-CM

## 2017-12-07 PROCEDURE — 99496 TRANSJ CARE MGMT HIGH F2F 7D: CPT | Performed by: FAMILY MEDICINE

## 2017-12-07 RX ORDER — OXYCODONE AND ACETAMINOPHEN 10; 325 MG/1; MG/1
2 TABLET ORAL EVERY 4 HOURS PRN
Start: 2017-12-07 | End: 2017-12-07 | Stop reason: SDUPTHER

## 2017-12-07 RX ORDER — OXYCODONE AND ACETAMINOPHEN 10; 325 MG/1; MG/1
2 TABLET ORAL EVERY 4 HOURS PRN
Qty: 60 TABLET | Refills: 0 | Status: SHIPPED | OUTPATIENT
Start: 2017-12-07 | End: 2017-12-14

## 2017-12-07 NOTE — PROGRESS NOTES
Subjective   Trevor Hernandez is a 42 y.o. male.     Back Pain   This is a chronic problem. The current episode started more than 1 year ago (Surgery 11/27/2017-left radiculopathy severe-started postop). The problem occurs constantly. The problem is unchanged. The quality of the pain is described as burning. The pain radiates to the left knee, left thigh and left foot. The pain is at a severity of 10/10. The pain is severe. The symptoms are aggravated by position. Stiffness is present all day. Risk factors: Recent surgery. He has tried analgesics for the symptoms. The treatment provided no relief.       The following portions of the patient's history were reviewed and updated as appropriate: allergies, current medications, past family history, past medical history, past social history, past surgical history and problem list.    Review of Systems   Gastrointestinal:        Bowel and bladder working   Musculoskeletal: Positive for back pain.   Neurological:        Severe lumbar radiculopathy on left       Objective   Physical Exam   Constitutional: He is oriented to person, place, and time.   Morbidly obese   Musculoskeletal:   Severe pain i.e. neurogenic left leg to his foot   Neurological: He is alert and oriented to person, place, and time. He has normal reflexes.   Straight leg test positive   Skin:   Wound incision looks okay   Psychiatric: He has a normal mood and affect. His behavior is normal. Judgment and thought content normal.   Nursing note and vitals reviewed.      Assessment/Plan   Trevor was seen today for transitional care management.    Diagnoses and all orders for this visit:    Chronic bilateral low back pain, with sciatica presence unspecified    Other orders  -     oxyCODONE-acetaminophen (PERCOCET)  MG per tablet; Take 2 tablets by mouth Every 4 (Four) Hours As Needed for Severe Pain  for up to 7 days.           Plan-neurosurgical consult-urgent

## 2018-01-23 RX ORDER — PROMETHAZINE HYDROCHLORIDE 25 MG/1
25 TABLET ORAL EVERY 4 HOURS PRN
Qty: 10 TABLET | Refills: 2 | Status: SHIPPED | OUTPATIENT
Start: 2018-01-23 | End: 2018-12-05

## 2018-01-23 NOTE — TELEPHONE ENCOUNTER
PT'S WIFE CALLED STARTED VOMITING THROUGH THE NIGHT, ABD PAIN/CRAMPING. C/O DIARRHEA, NAUSEA.  CAN WE CALL IN PHENERGAN RX?  DO YOU WANT TO SEE IN OFFICE?  [facility]    Phenergan 25-10 mg every 4 hours when necessary clear liquids

## 2018-01-23 NOTE — TELEPHONE ENCOUNTER
PT'S WIFE WAS ADVISED CLEAR LIQUIDS & RX SENT TO PHARMACY.       [facility]

## 2018-02-07 RX ORDER — SERTRALINE HYDROCHLORIDE 100 MG/1
TABLET, FILM COATED ORAL
Qty: 90 TABLET | Refills: 0 | Status: SHIPPED | OUTPATIENT
Start: 2018-02-07 | End: 2018-05-23 | Stop reason: SDUPTHER

## 2018-02-07 NOTE — TELEPHONE ENCOUNTER
Last office visit 12/7/17 for TCM visit.  Patient has had recent back surgery and is aware due appt

## 2018-04-02 RX ORDER — OSELTAMIVIR PHOSPHATE 75 MG/1
75 CAPSULE ORAL DAILY
Qty: 10 CAPSULE | Refills: 0 | Status: SHIPPED | OUTPATIENT
Start: 2018-04-02 | End: 2018-12-05

## 2018-04-02 NOTE — TELEPHONE ENCOUNTER
PT'S WIFE STATES CHILD HAD FLU-TYPE A  STARTED CHILLS, FEVER, BODY ACHES, COUGH      DO YOU NEED TO SEE OR RX?    [facility]           Tamiflu 75 daily ×10 days

## 2018-04-19 PROBLEM — R07.9 CHEST PAIN: Status: RESOLVED | Noted: 2017-03-30 | Resolved: 2018-04-19

## 2018-04-19 PROBLEM — M48.061 LUMBAR STENOSIS: Status: RESOLVED | Noted: 2017-11-27 | Resolved: 2018-04-19

## 2018-05-23 RX ORDER — SERTRALINE HYDROCHLORIDE 100 MG/1
TABLET, FILM COATED ORAL
Qty: 90 TABLET | Refills: 0 | Status: SHIPPED | OUTPATIENT
Start: 2018-05-23 | End: 2018-08-28 | Stop reason: SDUPTHER

## 2018-08-28 RX ORDER — SERTRALINE HYDROCHLORIDE 100 MG/1
TABLET, FILM COATED ORAL
Qty: 90 TABLET | Refills: 0 | Status: SHIPPED | OUTPATIENT
Start: 2018-08-28 | End: 2018-11-19 | Stop reason: SDUPTHER

## 2018-11-19 RX ORDER — SERTRALINE HYDROCHLORIDE 100 MG/1
TABLET, FILM COATED ORAL
Qty: 90 TABLET | Refills: 0 | Status: SHIPPED | OUTPATIENT
Start: 2018-11-19 | End: 2019-03-11 | Stop reason: SDUPTHER

## 2018-12-05 ENCOUNTER — OFFICE VISIT (OUTPATIENT)
Dept: FAMILY MEDICINE CLINIC | Facility: CLINIC | Age: 43
End: 2018-12-05

## 2018-12-05 VITALS
TEMPERATURE: 99.2 F | WEIGHT: 248 LBS | DIASTOLIC BLOOD PRESSURE: 99 MMHG | SYSTOLIC BLOOD PRESSURE: 149 MMHG | OXYGEN SATURATION: 98 % | HEART RATE: 79 BPM | BODY MASS INDEX: 35.5 KG/M2 | HEIGHT: 70 IN

## 2018-12-05 DIAGNOSIS — Z23 NEED FOR PROPHYLACTIC VACCINATION AND INOCULATION AGAINST INFLUENZA: ICD-10-CM

## 2018-12-05 DIAGNOSIS — Z00.00 ANNUAL PHYSICAL EXAM: ICD-10-CM

## 2018-12-05 DIAGNOSIS — I10 ESSENTIAL HYPERTENSION: ICD-10-CM

## 2018-12-05 DIAGNOSIS — Z23 NEED FOR PROPHYLACTIC VACCINATION AGAINST STREPTOCOCCUS PNEUMONIAE (PNEUMOCOCCUS): ICD-10-CM

## 2018-12-05 DIAGNOSIS — Z23 NEED FOR PROPHYLACTIC VACCINATION WITH COMBINED DIPHTHERIA-TETANUS-PERTUSSIS (DTP) VACCINE: ICD-10-CM

## 2018-12-05 DIAGNOSIS — R73.9 HYPERGLYCEMIA: ICD-10-CM

## 2018-12-05 DIAGNOSIS — Z00.00 ROUTINE GENERAL MEDICAL EXAMINATION AT A HEALTH CARE FACILITY: Primary | ICD-10-CM

## 2018-12-05 DIAGNOSIS — E55.9 VITAMIN D DEFICIENCY: ICD-10-CM

## 2018-12-05 LAB
25(OH)D3+25(OH)D2 SERPL-MCNC: 18.8 NG/ML (ref 30–100)
ALBUMIN SERPL-MCNC: 4.4 G/DL (ref 3.5–5)
ALBUMIN/GLOB SERPL: 1.3 G/DL (ref 1.1–2.5)
ALP SERPL-CCNC: 107 U/L (ref 24–120)
ALT SERPL-CCNC: 47 U/L (ref 0–54)
AST SERPL-CCNC: 27 U/L (ref 7–45)
BASOPHILS # BLD AUTO: 0.05 10*3/MM3 (ref 0–0.2)
BASOPHILS NFR BLD AUTO: 0.6 % (ref 0–2)
BILIRUB BLD-MCNC: NEGATIVE MG/DL
BILIRUB SERPL-MCNC: 0.8 MG/DL (ref 0.1–1)
BUN SERPL-MCNC: 14 MG/DL (ref 5–21)
BUN/CREAT SERPL: 21.5 (ref 7–25)
CALCIUM SERPL-MCNC: 9.5 MG/DL (ref 8.4–10.4)
CHLORIDE SERPL-SCNC: 100 MMOL/L (ref 98–110)
CHOLEST SERPL-MCNC: 150 MG/DL (ref 130–200)
CLARITY, POC: CLEAR
CO2 SERPL-SCNC: 21 MMOL/L (ref 24–31)
COLOR UR: YELLOW
CREAT SERPL-MCNC: 0.65 MG/DL (ref 0.5–1.4)
EOSINOPHIL # BLD AUTO: 0.07 10*3/MM3 (ref 0–0.7)
EOSINOPHIL NFR BLD AUTO: 0.9 % (ref 0–4)
ERYTHROCYTE [DISTWIDTH] IN BLOOD BY AUTOMATED COUNT: 13.2 % (ref 12–15)
GLOBULIN SER CALC-MCNC: 3.3 GM/DL
GLUCOSE SERPL-MCNC: 185 MG/DL (ref 70–100)
GLUCOSE UR STRIP-MCNC: ABNORMAL MG/DL
HBA1C MFR BLD: 8.7 %
HCT VFR BLD AUTO: 47.3 % (ref 40–52)
HDLC SERPL-MCNC: 36 MG/DL
HGB BLD-MCNC: 15.9 G/DL (ref 14–18)
IMM GRANULOCYTES # BLD: 0.03 10*3/MM3 (ref 0–0.03)
IMM GRANULOCYTES NFR BLD: 0.4 % (ref 0–5)
KETONES UR QL: NEGATIVE
LDLC SERPL CALC-MCNC: 79 MG/DL (ref 0–99)
LEUKOCYTE EST, POC: NEGATIVE
LYMPHOCYTES # BLD AUTO: 2.01 10*3/MM3 (ref 0.72–4.86)
LYMPHOCYTES NFR BLD AUTO: 25.5 % (ref 15–45)
MCH RBC QN AUTO: 28.2 PG (ref 28–32)
MCHC RBC AUTO-ENTMCNC: 33.6 G/DL (ref 33–36)
MCV RBC AUTO: 83.9 FL (ref 82–95)
MONOCYTES # BLD AUTO: 0.38 10*3/MM3 (ref 0.19–1.3)
MONOCYTES NFR BLD AUTO: 4.8 % (ref 4–12)
NEUTROPHILS # BLD AUTO: 5.35 10*3/MM3 (ref 1.87–8.4)
NEUTROPHILS NFR BLD AUTO: 67.8 % (ref 39–78)
NITRITE UR-MCNC: NEGATIVE MG/ML
NRBC BLD AUTO-RTO: 0 /100 WBC (ref 0–0)
PH UR: 5.5 [PH] (ref 5–8)
PLATELET # BLD AUTO: 349 10*3/MM3 (ref 130–400)
POTASSIUM SERPL-SCNC: 4.5 MMOL/L (ref 3.5–5.3)
PROT SERPL-MCNC: 7.7 G/DL (ref 6.3–8.7)
PROT UR STRIP-MCNC: NEGATIVE MG/DL
RBC # BLD AUTO: 5.64 10*6/MM3 (ref 4.8–5.9)
RBC # UR STRIP: NEGATIVE /UL
SODIUM SERPL-SCNC: 137 MMOL/L (ref 135–145)
SP GR UR: 1.03 (ref 1–1.03)
T4 FREE SERPL-MCNC: 1.05 NG/DL (ref 0.78–2.19)
TRIGL SERPL-MCNC: 176 MG/DL (ref 0–149)
TSH SERPL DL<=0.005 MIU/L-ACNC: 1.2 MIU/ML (ref 0.47–4.68)
UROBILINOGEN UR QL: NORMAL
VLDLC SERPL CALC-MCNC: 35.2 MG/DL
WBC # BLD AUTO: 7.89 10*3/MM3 (ref 4.8–10.8)

## 2018-12-05 PROCEDURE — 90715 TDAP VACCINE 7 YRS/> IM: CPT | Performed by: FAMILY MEDICINE

## 2018-12-05 PROCEDURE — 99396 PREV VISIT EST AGE 40-64: CPT | Performed by: FAMILY MEDICINE

## 2018-12-05 PROCEDURE — 90674 CCIIV4 VAC NO PRSV 0.5 ML IM: CPT | Performed by: FAMILY MEDICINE

## 2018-12-05 PROCEDURE — 90471 IMMUNIZATION ADMIN: CPT | Performed by: FAMILY MEDICINE

## 2018-12-05 PROCEDURE — 90670 PCV13 VACCINE IM: CPT | Performed by: FAMILY MEDICINE

## 2018-12-05 PROCEDURE — 90472 IMMUNIZATION ADMIN EACH ADD: CPT | Performed by: FAMILY MEDICINE

## 2018-12-05 PROCEDURE — 81003 URINALYSIS AUTO W/O SCOPE: CPT | Performed by: FAMILY MEDICINE

## 2018-12-05 RX ORDER — GABAPENTIN 800 MG/1
800 TABLET ORAL 3 TIMES DAILY
COMMUNITY
End: 2023-01-10 | Stop reason: ALTCHOICE

## 2018-12-05 RX ORDER — VALACYCLOVIR HYDROCHLORIDE 1 G/1
1000 TABLET, FILM COATED ORAL 3 TIMES DAILY
Qty: 21 TABLET | Refills: 0 | Status: SHIPPED | OUTPATIENT
Start: 2018-12-05 | End: 2019-06-27

## 2018-12-05 NOTE — PATIENT INSTRUCTIONS
Preventive Care 18-39 Years, Male  Preventive care refers to lifestyle choices and visits with your health care provider that can promote health and wellness.  What does preventive care include?  · A yearly physical exam. This is also called an annual well check.  · Dental exams once or twice a year.  · Routine eye exams. Ask your health care provider how often you should have your eyes checked.  · Personal lifestyle choices, including:  ? Daily care of your teeth and gums.  ? Regular physical activity.  ? Eating a healthy diet.  ? Avoiding tobacco and drug use.  ? Limiting alcohol use.  ? Practicing safe sex.  What happens during an annual well check?  The services and screenings done by your health care provider during your annual well check will depend on your age, overall health, lifestyle risk factors, and family history of disease.  Counseling  Your health care provider may ask you questions about your:  · Alcohol use.  · Tobacco use.  · Drug use.  · Emotional well-being.  · Home and relationship well-being.  · Sexual activity.  · Eating habits.  · Work and work environment.    Screening  You may have the following tests or measurements:  · Height, weight, and BMI.  · Blood pressure.  · Lipid and cholesterol levels. These may be checked every 5 years starting at age 20.  · Diabetes screening. This is done by checking your blood sugar (glucose) after you have not eaten for a while (fasting).  · Skin check.  · Hepatitis C blood test.  · Hepatitis B blood test.  · Sexually transmitted disease (STD) testing.    Discuss your test results, treatment options, and if necessary, the need for more tests with your health care provider.  Vaccines  Your health care provider may recommend certain vaccines, such as:  · Influenza vaccine. This is recommended every year.  · Tetanus, diphtheria, and acellular pertussis (Tdap, Td) vaccine. You may need a Td booster every 10 years.  · Varicella vaccine. You may need this if  you have not been vaccinated.  · HPV vaccine. If you are 26 or younger, you may need three doses over 6 months.  · Measles, mumps, and rubella (MMR) vaccine. You may need at least one dose of MMR. You may also need a second dose.  · Pneumococcal 13-valent conjugate (PCV13) vaccine. You may need this if you have certain conditions and have not been vaccinated.  · Pneumococcal polysaccharide (PPSV23) vaccine. You may need one or two doses if you smoke cigarettes or if you have certain conditions.  · Meningococcal vaccine. One dose is recommended if you are age 19-21 years and a first-year college student living in a residence [place], or if you have one of several medical conditions. You may also need additional booster doses.  · Hepatitis A vaccine. You may need this if you have certain conditions or if you travel or work in places where you may be exposed to hepatitis A.  · Hepatitis B vaccine. You may need this if you have certain conditions or if you travel or work in places where you may be exposed to hepatitis B.  · Haemophilus influenzae type b (Hib) vaccine. You may need this if you have certain risk factors.    Talk to your health care provider about which screenings and vaccines you need and how often you need them.  This information is not intended to replace advice given to you by your health care provider. Make sure you discuss any questions you have with your health care provider.  Document Released: 02/13/2003 Document Revised: 09/06/2017 Document Reviewed: 10/18/2016  [employer] Patient Education © 2018 [employer]

## 2018-12-05 NOTE — PROGRESS NOTES
Subjective   Trevor Hernandez is a 43 y.o. male.     Hyperlipidemia   This is a chronic problem. The current episode started more than 1 year ago. Recent lipid tests were reviewed and are variable. Exacerbating diseases include diabetes and obesity. Factors aggravating his hyperlipidemia include fatty foods. Pertinent negatives include no chest pain or shortness of breath. Current antihyperlipidemic treatment includes diet change and statins. The current treatment provides moderate improvement of lipids. Compliance problems include adherence to exercise and adherence to diet.  Risk factors for coronary artery disease include diabetes mellitus, male sex, obesity, hypertension and a sedentary lifestyle.        The following portions of the patient's history were reviewed and updated as appropriate: allergies, current medications, past family history, past medical history, past social history, past surgical history and problem list.    Review of Systems   Respiratory: Negative for shortness of breath.    Cardiovascular: Negative for chest pain and leg swelling.   Genitourinary:        Ulcerated skin lesion on penis and left hip consistent with shingles-been there a week   All other systems reviewed and are negative.      Objective   Physical Exam   Constitutional: He is oriented to person, place, and time.   Obesity   HENT:   Right Ear: External ear normal.   Left Ear: External ear normal.   Mouth/Throat: Oropharynx is clear and moist.   Eyes: EOM are normal. Pupils are equal, round, and reactive to light.   Neck: No thyromegaly present.   Good carotid pulses   Cardiovascular: Normal rate and regular rhythm.   Pulmonary/Chest: Effort normal and breath sounds normal.   Abdominal: Soft. Bowel sounds are normal.   Genitourinary: Rectum normal and prostate normal.   Genitourinary Comments: Ulcerated skin lesion dorsal aspect of penis and left hip skin lesion consistent with shingles a-week-old-testicles  normal-prostate normal no nodules   Musculoskeletal: He exhibits no edema.    Trevor had a diabetic foot exam performed today.   During the foot exam he had a monofilament test not performed.  Vascular Status -  His right foot exhibits abnormal foot vasculature . His right foot exhibits no edema. His left foot exhibits abnormal foot vasculature . His left foot exhibits no edema.  Skin Integrity  -  His right foot skin is not intact.  His left foot skin is not intact..   Foot Structure and Biomechanics -  His right foot has no hallux valgus, no pes cavus, no claw toes and no hammer toes present. His left foot has no hallux valgus, no pes cavus, no claw toes and no hammer toes.  Lymphadenopathy:     He has no cervical adenopathy.   Neurological: He is alert and oriented to person, place, and time.   Skin: Skin is warm and dry. Capillary refill takes less than 2 seconds.   Psychiatric: He has a normal mood and affect. His behavior is normal. Judgment and thought content normal.   Nursing note and vitals reviewed.      Assessment/Plan   Problems Addressed this Visit     None      Visit Diagnoses     Routine general medical examination at a health care facility    -  Primary    Relevant Orders    TSH    T4, free    CBC & Differential    Comprehensive Metabolic Panel    Lipid Panel    Hyperglycemia        Relevant Orders    Comprehensive Metabolic Panel    Hemoglobin A1c    MicroAlbumin, Urine, Random - Urine, Clean Catch    Essential hypertension        Relevant Orders    POC Urinalysis Dipstick, Automated (Completed)    Vitamin D deficiency        Relevant Orders    Vitamin D 25 Hydroxy    Annual physical exam        Need for prophylactic vaccination and inoculation against influenza        Relevant Orders    Flucelvax Quad=>4Years (PFS)    Need for prophylactic vaccination with combined diphtheria-tetanus-pertussis (DTP) vaccine        Relevant Orders    Tdap Vaccine Greater Than or Equal To 6yo IM    Need for  prophylactic vaccination against Streptococcus pneumoniae (pneumococcus)        Relevant Orders    Pneumococcal Conjugate Vaccine 13-Valent All            Plan above plus Valtrex for a week return-denies sexual contacts other than wife

## 2018-12-06 LAB — MICROALBUMIN UR-MCNC: 16.4 UG/ML

## 2018-12-12 DIAGNOSIS — E55.9 VITAMIN D DEFICIENCY: Primary | ICD-10-CM

## 2018-12-12 RX ORDER — ERGOCALCIFEROL 1.25 MG/1
50000 CAPSULE ORAL WEEKLY
Qty: 12 CAPSULE | Refills: 0 | Status: SHIPPED | OUTPATIENT
Start: 2018-12-12 | End: 2019-06-27

## 2018-12-14 ENCOUNTER — TELEPHONE (OUTPATIENT)
Dept: FAMILY MEDICINE CLINIC | Facility: CLINIC | Age: 43
End: 2018-12-14

## 2018-12-14 RX ORDER — FLUCONAZOLE 150 MG/1
150 TABLET ORAL DAILY
Qty: 5 TABLET | Refills: 0 | Status: SHIPPED | OUTPATIENT
Start: 2018-12-14 | End: 2019-06-27

## 2019-02-08 RX ORDER — BUTALBITAL, ACETAMINOPHEN AND CAFFEINE 50; 325; 40 MG/1; MG/1; MG/1
1 TABLET ORAL EVERY 4 HOURS PRN
Qty: 10 TABLET | Refills: 1 | Status: SHIPPED | OUTPATIENT
Start: 2019-02-08 | End: 2020-03-31

## 2019-02-08 RX ORDER — ONDANSETRON 4 MG/1
4 TABLET, FILM COATED ORAL EVERY 6 HOURS PRN
Qty: 10 TABLET | Refills: 1 | Status: SHIPPED | OUTPATIENT
Start: 2019-02-08 | End: 2020-12-13 | Stop reason: HOSPADM

## 2019-02-08 NOTE — TELEPHONE ENCOUNTER
Patient has migraine, vomiting.  Has had medication in past but cannot remember what name           send in Zofran 4 mg every 6 and Fioricet No. 10 every 4-6 as needed headache

## 2019-03-11 RX ORDER — SERTRALINE HYDROCHLORIDE 100 MG/1
TABLET, FILM COATED ORAL
Qty: 90 TABLET | Refills: 2 | Status: SHIPPED | OUTPATIENT
Start: 2019-03-11 | End: 2020-01-13

## 2019-03-12 ENCOUNTER — RESULTS ENCOUNTER (OUTPATIENT)
Dept: FAMILY MEDICINE CLINIC | Facility: CLINIC | Age: 44
End: 2019-03-12

## 2019-03-12 DIAGNOSIS — E55.9 VITAMIN D DEFICIENCY: ICD-10-CM

## 2019-06-27 ENCOUNTER — OFFICE VISIT (OUTPATIENT)
Dept: FAMILY MEDICINE CLINIC | Facility: CLINIC | Age: 44
End: 2019-06-27

## 2019-06-27 VITALS
WEIGHT: 265 LBS | HEIGHT: 70 IN | DIASTOLIC BLOOD PRESSURE: 92 MMHG | TEMPERATURE: 98.6 F | SYSTOLIC BLOOD PRESSURE: 157 MMHG | BODY MASS INDEX: 37.94 KG/M2 | OXYGEN SATURATION: 95 % | HEART RATE: 71 BPM

## 2019-06-27 DIAGNOSIS — E78.2 MIXED HYPERLIPIDEMIA: ICD-10-CM

## 2019-06-27 DIAGNOSIS — R73.9 HYPERGLYCEMIA: Primary | ICD-10-CM

## 2019-06-27 PROCEDURE — 99214 OFFICE O/P EST MOD 30 MIN: CPT | Performed by: FAMILY MEDICINE

## 2019-06-27 RX ORDER — LOSARTAN POTASSIUM 50 MG/1
50 TABLET ORAL DAILY
Qty: 90 TABLET | Refills: 3 | Status: SHIPPED | OUTPATIENT
Start: 2019-06-27 | End: 2020-07-20

## 2019-06-27 NOTE — PROGRESS NOTES
Subjective   Trevor Hernandez is a 44 y.o. male.     44-year-old diabetic with obesity hyperlipidemia and hypertension      Hyperlipidemia   This is a chronic problem. The current episode started more than 1 year ago. The problem is resistant. Recent lipid tests were reviewed and are variable. Exacerbating diseases include diabetes and obesity. There are no known factors aggravating his hyperlipidemia. Pertinent negatives include no chest pain. Current antihyperlipidemic treatment includes diet change and statins. The current treatment provides moderate improvement of lipids. Compliance problems include adherence to diet and adherence to exercise.  Risk factors for coronary artery disease include diabetes mellitus, dyslipidemia, male sex, hypertension, a sedentary lifestyle and obesity.       The following portions of the patient's history were reviewed and updated as appropriate: allergies, current medications, past family history, past medical history, past social history, past surgical history and problem list.    Review of Systems   Eyes:        Patient advised to get eyes checked   Cardiovascular: Negative for chest pain and leg swelling.   Musculoskeletal: Positive for back pain.       Objective   Physical Exam   Constitutional: He is oriented to person, place, and time.   Obesity   Cardiovascular: Normal rate and regular rhythm.   Pulmonary/Chest: Effort normal and breath sounds normal.   Musculoskeletal: He exhibits no edema.   Neurological: He is alert and oriented to person, place, and time.   Psychiatric: He has a normal mood and affect. His behavior is normal. Judgment and thought content normal.   Nursing note and vitals reviewed.      Assessment/Plan   Patient Active Problem List   Diagnosis   • Back pain   • Displacement of lumbar intervertebral disc without myelopathy     Trevor was seen today for follow-up.    Diagnoses and all orders for this visit:    Hyperglycemia  -     Comprehensive  Metabolic Panel  -     Hemoglobin A1c    Mixed hyperlipidemia  -     Comprehensive Metabolic Panel  -     Lipid Panel    Other orders  -     losartan (COZAAR) 50 MG tablet; Take 1 tablet by mouth Daily.       Return in about 3 months (around 9/27/2019).    Plan-continue to lose weight-add above medication for blood pressure- having trouble affording new diabetic medicine

## 2019-06-28 LAB
ALBUMIN SERPL-MCNC: 4.2 G/DL (ref 3.5–5.2)
ALBUMIN/GLOB SERPL: 1.2 G/DL
ALP SERPL-CCNC: 104 U/L (ref 39–117)
ALT SERPL-CCNC: 42 U/L (ref 1–41)
AST SERPL-CCNC: 27 U/L (ref 1–40)
BILIRUB SERPL-MCNC: 1 MG/DL (ref 0.2–1.2)
BUN SERPL-MCNC: 10 MG/DL (ref 6–20)
BUN/CREAT SERPL: 10.2 (ref 7–25)
CALCIUM SERPL-MCNC: 9.9 MG/DL (ref 8.6–10.5)
CHLORIDE SERPL-SCNC: 91 MMOL/L (ref 98–107)
CHOLEST SERPL-MCNC: 201 MG/DL (ref 0–200)
CO2 SERPL-SCNC: 27.9 MMOL/L (ref 22–29)
CREAT SERPL-MCNC: 0.98 MG/DL (ref 0.76–1.27)
GLOBULIN SER CALC-MCNC: 3.5 GM/DL
GLUCOSE SERPL-MCNC: 284 MG/DL (ref 65–99)
HBA1C MFR BLD: 10.4 % (ref 4.8–5.6)
HDLC SERPL-MCNC: 29 MG/DL (ref 40–60)
LDLC SERPL CALC-MCNC: ABNORMAL MG/DL
POTASSIUM SERPL-SCNC: 5.5 MMOL/L (ref 3.5–5.2)
PROT SERPL-MCNC: 7.7 G/DL (ref 6–8.5)
SODIUM SERPL-SCNC: 131 MMOL/L (ref 136–145)
TRIGL SERPL-MCNC: 599 MG/DL (ref 0–150)
VLDLC SERPL CALC-MCNC: ABNORMAL MG/DL

## 2019-06-28 RX ORDER — GLIPIZIDE 10 MG/1
10 TABLET, FILM COATED, EXTENDED RELEASE ORAL EVERY MORNING
Qty: 90 TABLET | Refills: 3 | Status: SHIPPED | OUTPATIENT
Start: 2019-06-28 | End: 2020-08-19

## 2019-07-02 ENCOUNTER — PRIOR AUTHORIZATION (OUTPATIENT)
Dept: FAMILY MEDICINE CLINIC | Facility: CLINIC | Age: 44
End: 2019-07-02

## 2019-10-11 ENCOUNTER — OFFICE VISIT (OUTPATIENT)
Dept: FAMILY MEDICINE CLINIC | Facility: CLINIC | Age: 44
End: 2019-10-11

## 2019-10-11 VITALS
BODY MASS INDEX: 36.36 KG/M2 | SYSTOLIC BLOOD PRESSURE: 158 MMHG | HEIGHT: 70 IN | OXYGEN SATURATION: 98 % | HEART RATE: 73 BPM | TEMPERATURE: 97.5 F | WEIGHT: 254 LBS | DIASTOLIC BLOOD PRESSURE: 104 MMHG

## 2019-10-11 DIAGNOSIS — E78.2 MIXED HYPERLIPIDEMIA: Primary | ICD-10-CM

## 2019-10-11 DIAGNOSIS — Z23 NEED FOR IMMUNIZATION AGAINST INFLUENZA: ICD-10-CM

## 2019-10-11 DIAGNOSIS — R73.9 HYPERGLYCEMIA: ICD-10-CM

## 2019-10-11 PROCEDURE — 99213 OFFICE O/P EST LOW 20 MIN: CPT | Performed by: FAMILY MEDICINE

## 2019-10-11 PROCEDURE — 90471 IMMUNIZATION ADMIN: CPT | Performed by: FAMILY MEDICINE

## 2019-10-11 PROCEDURE — 90674 CCIIV4 VAC NO PRSV 0.5 ML IM: CPT | Performed by: FAMILY MEDICINE

## 2019-10-11 NOTE — PROGRESS NOTES
Subjective   Trevor Hernandez is a 44 y.o. male.     44-year-old diabetic with hypertension and hyperlipidemia-off blood pressure medicine x2 days      Hyperlipidemia   This is a chronic problem. The current episode started more than 1 year ago. Recent lipid tests were reviewed and are variable. Exacerbating diseases include diabetes and obesity. Pertinent negatives include no chest pain. Current antihyperlipidemic treatment includes diet change and statins. The current treatment provides mild improvement of lipids. Compliance problems include adherence to diet and adherence to exercise.  Risk factors for coronary artery disease include diabetes mellitus, dyslipidemia, male sex, hypertension, obesity and a sedentary lifestyle.       The following portions of the patient's history were reviewed and updated as appropriate: allergies, current medications, past family history, past medical history, past social history, past surgical history and problem list.    Review of Systems   Cardiovascular: Negative for chest pain and leg swelling.   Endocrine: Negative for polydipsia, polyphagia and polyuria.   Genitourinary: Negative for difficulty urinating.   Musculoskeletal: Positive for back pain.        Goes to orthopedic pain management for chronic back pain       Objective   Physical Exam   Constitutional: He is oriented to person, place, and time.   Obesity   Cardiovascular: Normal rate and regular rhythm.   Pulmonary/Chest: Effort normal and breath sounds normal.   Musculoskeletal: He exhibits no edema.   Neurological: He is alert and oriented to person, place, and time.   Skin: Skin is warm and dry.   Psychiatric: He has a normal mood and affect. His behavior is normal. Judgment and thought content normal.   Nursing note and vitals reviewed.      Assessment/Plan   Patient Active Problem List   Diagnosis   • Back pain   • Displacement of lumbar intervertebral disc without myelopathy     Trevor was seen today  for follow-up.    Diagnoses and all orders for this visit:    Mixed hyperlipidemia  -     Comprehensive Metabolic Panel  -     Lipid Panel    Hyperglycemia  -     Comprehensive Metabolic Panel  -     Hemoglobin A1c    Need for immunization against influenza  -     Flucelvax Quad=>4Years (PFS)       Return if symptoms worsen or fail to improve, for Next scheduled follow up.       Plan above      Electronically signed by Efrem Deng MD 10/11/2019

## 2019-10-12 LAB
ALBUMIN SERPL-MCNC: 4.5 G/DL (ref 3.5–5.2)
ALBUMIN/GLOB SERPL: 1.3 G/DL
ALP SERPL-CCNC: 118 U/L (ref 39–117)
ALT SERPL-CCNC: 40 U/L (ref 1–41)
AST SERPL-CCNC: 21 U/L (ref 1–40)
BILIRUB SERPL-MCNC: 0.7 MG/DL (ref 0.2–1.2)
BUN SERPL-MCNC: 15 MG/DL (ref 6–20)
BUN/CREAT SERPL: 15.5 (ref 7–25)
CALCIUM SERPL-MCNC: 9.6 MG/DL (ref 8.6–10.5)
CHLORIDE SERPL-SCNC: 95 MMOL/L (ref 98–107)
CHOLEST SERPL-MCNC: 173 MG/DL (ref 0–200)
CO2 SERPL-SCNC: 26 MMOL/L (ref 22–29)
CREAT SERPL-MCNC: 0.97 MG/DL (ref 0.76–1.27)
GLOBULIN SER CALC-MCNC: 3.5 GM/DL
GLUCOSE SERPL-MCNC: 223 MG/DL (ref 65–99)
HBA1C MFR BLD: 9.2 % (ref 4.8–5.6)
HDLC SERPL-MCNC: 40 MG/DL (ref 40–60)
LDLC SERPL CALC-MCNC: 96 MG/DL (ref 0–100)
POTASSIUM SERPL-SCNC: 5.7 MMOL/L (ref 3.5–5.2)
PROT SERPL-MCNC: 8 G/DL (ref 6–8.5)
SODIUM SERPL-SCNC: 135 MMOL/L (ref 136–145)
TRIGL SERPL-MCNC: 184 MG/DL (ref 0–150)
VLDLC SERPL CALC-MCNC: 36.8 MG/DL

## 2019-11-25 ENCOUNTER — OFFICE VISIT (OUTPATIENT)
Dept: FAMILY MEDICINE CLINIC | Facility: CLINIC | Age: 44
End: 2019-11-25

## 2019-11-25 VITALS
TEMPERATURE: 97.9 F | SYSTOLIC BLOOD PRESSURE: 133 MMHG | OXYGEN SATURATION: 98 % | WEIGHT: 265.2 LBS | HEART RATE: 77 BPM | DIASTOLIC BLOOD PRESSURE: 83 MMHG | BODY MASS INDEX: 38.05 KG/M2

## 2019-11-25 DIAGNOSIS — J32.9 SINUSITIS, UNSPECIFIED CHRONICITY, UNSPECIFIED LOCATION: Primary | ICD-10-CM

## 2019-11-25 PROCEDURE — 96372 THER/PROPH/DIAG INJ SC/IM: CPT | Performed by: FAMILY MEDICINE

## 2019-11-25 PROCEDURE — 99213 OFFICE O/P EST LOW 20 MIN: CPT | Performed by: FAMILY MEDICINE

## 2019-11-25 RX ORDER — METHYLPREDNISOLONE ACETATE 40 MG/ML
40 INJECTION, SUSPENSION INTRA-ARTICULAR; INTRALESIONAL; INTRAMUSCULAR; SOFT TISSUE ONCE
Status: COMPLETED | OUTPATIENT
Start: 2019-11-25 | End: 2019-11-25

## 2019-11-25 RX ORDER — PREDNISONE 20 MG/1
TABLET ORAL
Qty: 10 TABLET | Refills: 0 | Status: SHIPPED | OUTPATIENT
Start: 2019-11-25 | End: 2020-08-10

## 2019-11-25 RX ORDER — AMOXICILLIN 500 MG/1
500 CAPSULE ORAL 3 TIMES DAILY
Qty: 21 CAPSULE | Refills: 0 | Status: SHIPPED | OUTPATIENT
Start: 2019-11-25 | End: 2019-12-02

## 2019-11-25 RX ADMIN — METHYLPREDNISOLONE ACETATE 40 MG: 40 INJECTION, SUSPENSION INTRA-ARTICULAR; INTRALESIONAL; INTRAMUSCULAR; SOFT TISSUE at 15:44

## 2019-11-25 NOTE — PROGRESS NOTES
Subjective   Trevor Hernandez is a 44 y.o. male.     44-year-old diabetic male with sinusitis      Sinusitis   This is a new problem. The current episode started 1 to 4 weeks ago. The problem has been waxing and waning since onset. The maximum temperature recorded prior to his arrival was 100.4 - 100.9 F. Associated symptoms include congestion, coughing, headaches, a hoarse voice and sinus pressure. Past treatments include spray decongestants and saline sprays.     Vitals:    11/25/19 1519   BP: 133/83   Pulse: 77   Temp: 97.9 °F (36.6 °C)   SpO2: 98%       The following portions of the patient's history were reviewed and updated as appropriate: allergies, current medications, past family history, past medical history, past social history, past surgical history and problem list.    Review of Systems   HENT: Positive for congestion, hoarse voice and sinus pressure.    Respiratory: Positive for cough.    Neurological: Positive for headaches.       Objective   Physical Exam   Constitutional: He is oriented to person, place, and time.   Obesity   HENT:   Right Ear: External ear normal.   Left Ear: External ear normal.   Mouth/Throat: Oropharyngeal exudate present.   Maxillary and frontal sinus tenderness on the right   Cardiovascular: Normal rate and regular rhythm.   Pulmonary/Chest: Effort normal. He has rales.   Neurological: He is alert and oriented to person, place, and time.   Psychiatric: He has a normal mood and affect. His behavior is normal. Judgment and thought content normal.   Nursing note and vitals reviewed.      Patient's Body mass index is 38.05 kg/m². BMI is above normal parameters. Recommendations include: exercise counseling.      Assessment/Plan   Patient Active Problem List   Diagnosis   • Back pain   • Displacement of lumbar intervertebral disc without myelopathy     Trevor was seen today for uri.    Diagnoses and all orders for this visit:    Sinusitis, unspecified chronicity,  unspecified location  -     methylPREDNISolone acetate (DEPO-medrol) injection 40 mg    Other orders  -     amoxicillin (AMOXIL) 500 MG capsule; Take 1 capsule by mouth 3 (Three) Times a Day for 7 days.  -     predniSONE (DELTASONE) 20 MG tablet; 2 on Tuesday then 1 each a.m. Till medicine gone       Return if symptoms worsen or fail to improve, for Next scheduled follow up.         Plan above-May need sinus x-rays if did not clear    Electronically signed by Efrem Deng MD 11/25/2019

## 2019-11-25 NOTE — PROGRESS NOTES
Administered 40 mg of methylprednisolone into left dorsogluteal. Patient tolerated well, observed no reactions.

## 2020-01-13 RX ORDER — SERTRALINE HYDROCHLORIDE 100 MG/1
TABLET, FILM COATED ORAL
Qty: 90 TABLET | Refills: 3 | Status: SHIPPED | OUTPATIENT
Start: 2020-01-13 | End: 2021-02-05

## 2020-02-11 ENCOUNTER — TELEPHONE (OUTPATIENT)
Dept: FAMILY MEDICINE CLINIC | Facility: CLINIC | Age: 45
End: 2020-02-11

## 2020-02-11 RX ORDER — PROMETHAZINE HYDROCHLORIDE 25 MG/1
25 TABLET ORAL EVERY 6 HOURS PRN
Qty: 25 TABLET | Refills: 0 | Status: SHIPPED | OUTPATIENT
Start: 2020-02-11 | End: 2020-12-13 | Stop reason: HOSPADM

## 2020-03-31 DIAGNOSIS — G44.211 INTRACTABLE EPISODIC TENSION-TYPE HEADACHE: Primary | ICD-10-CM

## 2020-03-31 RX ORDER — BUTALBITAL, ACETAMINOPHEN AND CAFFEINE 50; 325; 40 MG/1; MG/1; MG/1
TABLET ORAL
Qty: 10 TABLET | Refills: 1 | Status: SHIPPED | OUTPATIENT
Start: 2020-03-31 | End: 2020-07-20

## 2020-07-20 DIAGNOSIS — G44.211 INTRACTABLE EPISODIC TENSION-TYPE HEADACHE: ICD-10-CM

## 2020-07-21 RX ORDER — LOSARTAN POTASSIUM 50 MG/1
50 TABLET ORAL DAILY
Qty: 30 TABLET | Refills: 0 | Status: SHIPPED | OUTPATIENT
Start: 2020-07-21 | End: 2020-08-19

## 2020-07-21 RX ORDER — BUTALBITAL, ACETAMINOPHEN AND CAFFEINE 50; 325; 40 MG/1; MG/1; MG/1
TABLET ORAL
Qty: 10 TABLET | Refills: 0 | Status: SHIPPED | OUTPATIENT
Start: 2020-07-21 | End: 2020-12-01

## 2020-08-10 ENCOUNTER — OFFICE VISIT (OUTPATIENT)
Dept: FAMILY MEDICINE CLINIC | Facility: CLINIC | Age: 45
End: 2020-08-10

## 2020-08-10 VITALS
WEIGHT: 278 LBS | DIASTOLIC BLOOD PRESSURE: 80 MMHG | TEMPERATURE: 97.3 F | OXYGEN SATURATION: 92 % | RESPIRATION RATE: 18 BRPM | SYSTOLIC BLOOD PRESSURE: 138 MMHG | HEART RATE: 85 BPM | HEIGHT: 70 IN | BODY MASS INDEX: 39.8 KG/M2

## 2020-08-10 DIAGNOSIS — R73.9 HYPERGLYCEMIA: ICD-10-CM

## 2020-08-10 DIAGNOSIS — Z00.00 ROUTINE GENERAL MEDICAL EXAMINATION AT A HEALTH CARE FACILITY: Primary | ICD-10-CM

## 2020-08-10 DIAGNOSIS — I10 ESSENTIAL HYPERTENSION: ICD-10-CM

## 2020-08-10 LAB
BILIRUB BLD-MCNC: NEGATIVE MG/DL
CLARITY, POC: CLEAR
COLOR UR: YELLOW
GLUCOSE UR STRIP-MCNC: ABNORMAL MG/DL
KETONES UR QL: NEGATIVE
LEUKOCYTE EST, POC: NEGATIVE
NITRITE UR-MCNC: NEGATIVE MG/ML
PH UR: 6 [PH] (ref 5–8)
PROT UR STRIP-MCNC: NEGATIVE MG/DL
RBC # UR STRIP: NEGATIVE /UL
SP GR UR: 1.03 (ref 1–1.03)
UROBILINOGEN UR QL: ABNORMAL

## 2020-08-10 PROCEDURE — 99396 PREV VISIT EST AGE 40-64: CPT | Performed by: FAMILY MEDICINE

## 2020-08-10 PROCEDURE — 81003 URINALYSIS AUTO W/O SCOPE: CPT | Performed by: FAMILY MEDICINE

## 2020-08-10 NOTE — PROGRESS NOTES
Subjective   Trevor Hernandez is a 45 y.o. male.     45-year-old male with diabetes hypertension and hyperlipidemia    Hyperlipidemia   This is a chronic problem. The current episode started more than 1 year ago. The problem is uncontrolled. Recent lipid tests were reviewed and are variable. Exacerbating diseases include diabetes and obesity. There are no known factors aggravating his hyperlipidemia. Pertinent negatives include no chest pain or shortness of breath. Current antihyperlipidemic treatment includes diet change and statins. The current treatment provides moderate improvement of lipids. Compliance problems include adherence to diet and adherence to exercise.  Risk factors for coronary artery disease include diabetes mellitus, dyslipidemia, family history, male sex, obesity, hypertension and a sedentary lifestyle.      Vitals:    08/10/20 0828   BP: 138/80   Pulse: 85   Resp: 18   Temp: 97.3 °F (36.3 °C)   SpO2: 92%       The following portions of the patient's history were reviewed and updated as appropriate: allergies, current medications, past family history, past medical history, past social history, past surgical history and problem list.    Review of Systems   Eyes:        Exam  this month   Respiratory: Negative for shortness of breath.    Cardiovascular: Negative for chest pain and leg swelling.   Musculoskeletal: Positive for back pain.   All other systems reviewed and are negative.      Objective   Physical Exam   Constitutional: He is oriented to person, place, and time.   Morbidly obese   HENT:   Right Ear: External ear normal.   Left Ear: External ear normal.   Mouth/Throat: Oropharynx is clear and moist.   Eyes: Pupils are equal, round, and reactive to light. EOM are normal.   Neck: No thyromegaly present.   Carotid pulses present   Cardiovascular: Normal rate and regular rhythm.   Pulmonary/Chest: Effort normal and breath sounds normal.   Abdominal: Soft. Bowel sounds are normal.    Genitourinary: Penis normal.   Genitourinary Comments: No testicular masses inguinal hernias or adenopathy   Musculoskeletal: He exhibits no edema.    Trevor had a diabetic foot exam performed today.   During the foot exam he had a monofilament test not performed.  Vascular Status -  His right foot exhibits normal foot vasculature  and no edema. His left foot exhibits normal foot vasculature  and no edema.  Skin Integrity  -  His right foot skin is intact.His left foot skin is intact..   Foot Structure and Biomechanics -  His right foot has no hallux valgus, no pes cavus, no claw toes and no hammer toes present. His left foot has no hallux valgus, no pes cavus, no claw toes and no hammer toes.  Lymphadenopathy:     He has no cervical adenopathy.   Neurological: He is alert and oriented to person, place, and time.   Skin: Skin is warm and dry. Capillary refill takes less than 2 seconds.   Psychiatric: He has a normal mood and affect. His behavior is normal. Judgment and thought content normal.   Nursing note and vitals reviewed.      Patient's Body mass index is 39.89 kg/m². BMI is above normal parameters. Recommendations include: exercise counseling.      Assessment/Plan   Patient Active Problem List   Diagnosis   • Back pain   • Displacement of lumbar intervertebral disc without myelopathy     Problems Addressed this Visit     None      Visit Diagnoses     Routine general medical examination at a health care facility    -  Primary    Relevant Orders    TSH    T4, free    CBC & Differential    Comprehensive Metabolic Panel    Hepatitis C Antibody    Lipid Panel    Essential hypertension        Relevant Orders    POC Urinalysis Dipstick, Multipro (Completed)    Hyperglycemia        Relevant Orders    Hemoglobin A1c    MicroAlbumin, Urine, Random - Urine, Clean Catch        Return in 6 months (on 2/10/2021).         Plan above plus encouraged to see endocrinologist plus get eye exam this month-COVID-19 safety  yearly flu shot  Electronically signed by Efrem Deng MD 08/10/2020

## 2020-08-10 NOTE — PATIENT INSTRUCTIONS
Preventive Care 21-39 Years Old, Male  Preventive care refers to lifestyle choices and visits with your health care provider that can promote health and wellness. This includes:  · A yearly physical exam. This is also called an annual well check.  · Regular dental and eye exams.  · Immunizations.  · Screening for certain conditions.  · Healthy lifestyle choices, such as eating a healthy diet, getting regular exercise, not using drugs or products that contain nicotine and tobacco, and limiting alcohol use.  What can I expect for my preventive care visit?  Physical exam  Your health care provider will check:  · Height and weight. These may be used to calculate body mass index (BMI), which is a measurement that tells if you are at a healthy weight.  · Heart rate and blood pressure.  · Your skin for abnormal spots.  Counseling  Your health care provider may ask you questions about:  · Alcohol, tobacco, and drug use.  · Emotional well-being.  · Home and relationship well-being.  · Sexual activity.  · Eating habits.  · Work and work environment.  What immunizations do I need?    Influenza (flu) vaccine  · This is recommended every year.  Tetanus, diphtheria, and pertussis (Tdap) vaccine  · You may need a Td booster every 10 years.  Varicella (chickenpox) vaccine  · You may need this vaccine if you have not already been vaccinated.  Human papillomavirus (HPV) vaccine  · If recommended by your health care provider, you may need three doses over 6 months.  Measles, mumps, and rubella (MMR) vaccine  · You may need at least one dose of MMR. You may also need a second dose.  Meningococcal conjugate (MenACWY) vaccine  · One dose is recommended if you are 19-21 years old and a first-year college student living in a residence [place], or if you have one of several medical conditions. You may also need additional booster doses.  Pneumococcal conjugate (PCV13) vaccine  · You may need this if you have certain conditions and were not  previously vaccinated.  Pneumococcal polysaccharide (PPSV23) vaccine  · You may need one or two doses if you smoke cigarettes or if you have certain conditions.  Hepatitis A vaccine  · You may need this if you have certain conditions or if you travel or work in places where you may be exposed to hepatitis A.  Hepatitis B vaccine  · You may need this if you have certain conditions or if you travel or work in places where you may be exposed to hepatitis B.  Haemophilus influenzae type b (Hib) vaccine  · You may need this if you have certain risk factors.  You may receive vaccines as individual doses or as more than one vaccine together in one shot (combination vaccines). Talk with your health care provider about the risks and benefits of combination vaccines.  What tests do I need?  Blood tests  · Lipid and cholesterol levels. These may be checked every 5 years starting at age 20.  · Hepatitis C test.  · Hepatitis B test.  Screening    · Diabetes screening. This is done by checking your blood sugar (glucose) after you have not eaten for a while (fasting).  · Sexually transmitted disease (STD) testing.  Talk with your health care provider about your test results, treatment options, and if necessary, the need for more tests.  Follow these instructions at home:  Eating and drinking    · Eat a diet that includes fresh fruits and vegetables, whole grains, lean protein, and low-fat dairy products.  · Take vitamin and mineral supplements as recommended by your health care provider.  · Do not drink alcohol if your health care provider tells you not to drink.  · If you drink alcohol:  ? Limit how much you have to 0-2 drinks a day.  ? Be aware of how much alcohol is in your drink. In the [place] one drink equals one 12 oz bottle of beer (355 mL), one 5 oz glass of wine (148 mL), or one 1½ oz glass of hard liquor (44 mL).  Lifestyle  · Take daily care of your teeth and gums.  · Stay active. Exercise for at least 30 minutes on 5 or  more days each week.  · Do not use any products that contain nicotine or tobacco, such as cigarettes, e-cigarettes, and chewing tobacco. If you need help quitting, ask your health care provider.  · If you are sexually active, practice safe sex. Use a condom or other form of protection to prevent STIs (sexually transmitted infections).  What's next?  · Go to your health care provider once a year for a well check visit.  · Ask your health care provider how often you should have your eyes and teeth checked.  · Stay up to date on all vaccines.  This information is not intended to replace advice given to you by your health care provider. Make sure you discuss any questions you have with your health care provider.  Document Released: 02/13/2003 Document Revised: 12/12/2019 Document Reviewed: 12/12/2019  [employer] Patient Education © 2020 [employer]    Exercising to Stay Healthy  To become healthy and stay healthy, it is recommended that you do moderate-intensity and vigorous-intensity exercise. You can tell that you are exercising at a moderate intensity if your heart starts beating faster and you start breathing faster but can still hold a conversation. You can tell that you are exercising at a vigorous intensity if you are breathing much harder and faster and cannot hold a conversation while exercising.  Exercising regularly is important. It has many health benefits, such as:  · Improving overall fitness, flexibility, and endurance.  · Increasing bone density.  · Helping with weight control.  · Decreasing body fat.  · Increasing muscle strength.  · Reducing stress and tension.  · Improving overall health.  How often should I exercise?  Choose an activity that you enjoy, and set realistic goals. Your health care provider can help you make an activity plan that works for you.  Exercise regularly as told by your health care provider. This may include:  · Doing strength training two times a week, such as:  ? Lifting  weights.  ? Using resistance bands.  ? Push-ups.  ? Sit-ups.  ? Yoga.  · Doing a certain intensity of exercise for a given amount of time. Choose from these options:  ? A total of 150 minutes of moderate-intensity exercise every week.  ? A total of 75 minutes of vigorous-intensity exercise every week.  ? A mix of moderate-intensity and vigorous-intensity exercise every week.  Children, pregnant women, people who have not exercised regularly, people who are overweight, and older adults may need to talk with a health care provider about what activities are safe to do. If you have a medical condition, be sure to talk with your health care provider before you start a new exercise program.  What are some exercise ideas?  Moderate-intensity exercise ideas include:  · Walking 1 mile (1.6 km) in about 15 minutes.  · Biking.  · Hiking.  · Golfing.  · Dancing.  · Water aerobics.  Vigorous-intensity exercise ideas include:  · Walking 4.5 miles (7.2 km) or more in about 1 hour.  · Jogging or running 5 miles (8 km) in about 1 hour.  · Biking 10 miles (16.1 km) or more in about 1 hour.  · Lap swimming.  · Roller-skating or in-line skating.  · Cross-country skiing.  · Vigorous competitive sports, such as football, basketball, and soccer.  · Jumping rope.  · Aerobic dancing.  What are some everyday activities that can help me to get exercise?  · Yard work, such as:  ? Pushing a .  ? Raking and bagging leaves.  · Washing your car.  · Pushing a stroller.  · Shoveling snow.  · Gardening.  · Washing windows or floors.  How can I be more active in my day-to-day activities?  · Use stairs instead of an elevator.  · Take a walk during your lunch break.  · If you drive, park your car farther away from your work or school.  · If you take public transportation, get off one stop early and walk the rest of the way.  · Stand up or walk around during all of your indoor phone calls.  · Get up, stretch, and walk around every 30 minutes  throughout the day.  · Enjoy exercise with a friend. Support to continue exercising will help you keep a regular routine of activity.  What guidelines can I follow while exercising?  · Before you start a new exercise program, talk with your health care provider.  · Do not exercise so much that you hurt yourself, feel dizzy, or get very short of breath.  · Wear comfortable clothes and wear shoes with good support.  · Drink plenty of water while you exercise to prevent dehydration or heat stroke.  · Work out until your breathing and your heartbeat get faster.  Where to find more information  · [place] Department of Health and Human Services: www.hhs.gov  · Centers for Disease Control and Prevention (CDC): www.cdc.gov  Summary  · Exercising regularly is important. It will improve your overall fitness, flexibility, and endurance.  · Regular exercise also will improve your overall health. It can help you control your weight, reduce stress, and improve your bone density.  · Do not exercise so much that you hurt yourself, feel dizzy, or get very short of breath.  · Before you start a new exercise program, talk with your health care provider.  This information is not intended to replace advice given to you by your health care provider. Make sure you discuss any questions you have with your health care provider.  Document Released: 01/20/2012 Document Revised: 11/30/2018 Document Reviewed: 11/08/2018  [employer] Patient Education © 2020 [employer]

## 2020-08-11 LAB
ALBUMIN SERPL-MCNC: 4.3 G/DL (ref 3.5–5.2)
ALBUMIN/GLOB SERPL: 1.6 G/DL
ALP SERPL-CCNC: 99 U/L (ref 39–117)
ALT SERPL-CCNC: 34 U/L (ref 1–41)
AST SERPL-CCNC: 20 U/L (ref 1–40)
BASOPHILS # BLD AUTO: 0.09 10*3/MM3 (ref 0–0.2)
BASOPHILS NFR BLD AUTO: 1.6 % (ref 0–1.5)
BILIRUB SERPL-MCNC: 0.5 MG/DL (ref 0–1.2)
BUN SERPL-MCNC: 8 MG/DL (ref 6–20)
BUN/CREAT SERPL: 10 (ref 7–25)
CALCIUM SERPL-MCNC: 9.1 MG/DL (ref 8.6–10.5)
CHLORIDE SERPL-SCNC: 91 MMOL/L (ref 98–107)
CHOLEST SERPL-MCNC: 203 MG/DL (ref 0–200)
CO2 SERPL-SCNC: 25.6 MMOL/L (ref 22–29)
CREAT SERPL-MCNC: 0.8 MG/DL (ref 0.76–1.27)
EOSINOPHIL # BLD AUTO: 0.18 10*3/MM3 (ref 0–0.4)
EOSINOPHIL NFR BLD AUTO: 3.2 % (ref 0.3–6.2)
ERYTHROCYTE [DISTWIDTH] IN BLOOD BY AUTOMATED COUNT: 13.2 % (ref 12.3–15.4)
GLOBULIN SER CALC-MCNC: 2.7 GM/DL
GLUCOSE SERPL-MCNC: 258 MG/DL (ref 65–99)
HBA1C MFR BLD: 10.7 % (ref 4.8–5.6)
HCT VFR BLD AUTO: 48.3 % (ref 37.5–51)
HCV AB S/CO SERPL IA: 0.1 S/CO RATIO (ref 0–0.9)
HDLC SERPL-MCNC: 28 MG/DL (ref 40–60)
HGB BLD-MCNC: 15.9 G/DL (ref 13–17.7)
IMM GRANULOCYTES # BLD AUTO: 0.01 10*3/MM3 (ref 0–0.05)
IMM GRANULOCYTES NFR BLD AUTO: 0.2 % (ref 0–0.5)
LDLC SERPL CALC-MCNC: ABNORMAL MG/DL
LYMPHOCYTES # BLD AUTO: 2.35 10*3/MM3 (ref 0.7–3.1)
LYMPHOCYTES NFR BLD AUTO: 42.3 % (ref 19.6–45.3)
MCH RBC QN AUTO: 29 PG (ref 26.6–33)
MCHC RBC AUTO-ENTMCNC: 32.9 G/DL (ref 31.5–35.7)
MCV RBC AUTO: 88 FL (ref 79–97)
MICROALBUMIN UR-MCNC: 4.4 UG/ML
MONOCYTES # BLD AUTO: 0.39 10*3/MM3 (ref 0.1–0.9)
MONOCYTES NFR BLD AUTO: 7 % (ref 5–12)
NEUTROPHILS # BLD AUTO: 2.53 10*3/MM3 (ref 1.7–7)
NEUTROPHILS NFR BLD AUTO: 45.7 % (ref 42.7–76)
NRBC BLD AUTO-RTO: 0 /100 WBC (ref 0–0.2)
PLATELET # BLD AUTO: 254 10*3/MM3 (ref 140–450)
POTASSIUM SERPL-SCNC: 4.7 MMOL/L (ref 3.5–5.2)
PROT SERPL-MCNC: 7 G/DL (ref 6–8.5)
RBC # BLD AUTO: 5.49 10*6/MM3 (ref 4.14–5.8)
SODIUM SERPL-SCNC: 130 MMOL/L (ref 136–145)
T4 FREE SERPL-MCNC: 1.19 NG/DL (ref 0.93–1.7)
TRIGL SERPL-MCNC: 930 MG/DL (ref 0–150)
TSH SERPL DL<=0.005 MIU/L-ACNC: 0.84 UIU/ML (ref 0.27–4.2)
VLDLC SERPL CALC-MCNC: ABNORMAL MG/DL
WBC # BLD AUTO: 5.55 10*3/MM3 (ref 3.4–10.8)

## 2020-08-13 DIAGNOSIS — R73.9 HYPERGLYCEMIA: Primary | ICD-10-CM

## 2020-08-14 RX ORDER — ATORVASTATIN CALCIUM 40 MG/1
40 TABLET, FILM COATED ORAL NIGHTLY
Qty: 90 TABLET | Refills: 3 | Status: SHIPPED | OUTPATIENT
Start: 2020-08-14

## 2020-08-19 ENCOUNTER — OFFICE VISIT (OUTPATIENT)
Dept: ENDOCRINOLOGY | Facility: CLINIC | Age: 45
End: 2020-08-19

## 2020-08-19 VITALS
HEIGHT: 70 IN | HEART RATE: 75 BPM | SYSTOLIC BLOOD PRESSURE: 150 MMHG | WEIGHT: 273.5 LBS | OXYGEN SATURATION: 100 % | BODY MASS INDEX: 39.16 KG/M2 | DIASTOLIC BLOOD PRESSURE: 90 MMHG

## 2020-08-19 DIAGNOSIS — E11.65 TYPE 2 DIABETES MELLITUS WITH HYPERGLYCEMIA, WITH LONG-TERM CURRENT USE OF INSULIN (HCC): Primary | ICD-10-CM

## 2020-08-19 DIAGNOSIS — I10 ESSENTIAL HYPERTENSION: ICD-10-CM

## 2020-08-19 DIAGNOSIS — E78.2 MIXED HYPERLIPIDEMIA: ICD-10-CM

## 2020-08-19 DIAGNOSIS — Z79.4 TYPE 2 DIABETES MELLITUS WITH HYPERGLYCEMIA, WITH LONG-TERM CURRENT USE OF INSULIN (HCC): Primary | ICD-10-CM

## 2020-08-19 PROCEDURE — 99204 OFFICE O/P NEW MOD 45 MIN: CPT | Performed by: NURSE PRACTITIONER

## 2020-08-19 RX ORDER — LOSARTAN POTASSIUM 50 MG/1
50 TABLET ORAL NIGHTLY
Qty: 90 TABLET | Refills: 3 | Status: SHIPPED | OUTPATIENT
Start: 2020-08-19 | End: 2021-09-21

## 2020-08-19 NOTE — PATIENT INSTRUCTIONS
Start Ozempic 0.25 mg once a week for 4 weeks then increase to 0.5 mg once a week    Side effects are nausea    If you are eating and become nauseated stop eatiing     If abdominal pain or vomiting stop medication    Start Toujeo 20 uints once daily     Am sugar should be 80 to 130    If you have an am sugar less than 80 decrease to 16 units       Start Lymjev sliding scale if needed for meals--- this is the fast acting insulin     Blood sugar  Insulin    151-200 2 units  201-250 4 untis  251-300 6 nits  Above 301  8 units         Goals for sugar    Fasting and before meals 80 to 130    2 hours after meals 180 or less      Aim for 60 grams of carbohydrate per meal    Aim for 15 grams of carbohydrate per snack

## 2020-08-19 NOTE — PROGRESS NOTES
Subjective    HPI     IN OFFICE VISIT      Referring provider Aixa Deng MD     45 year old male presents for consultation     REASON - diabetes       Duration/Timing -diagnosed in 2016/ constant       Quality -not controlled       Severity - moderate due to hyperglycemia       Severity (Complication/Hospitalizations)        Secondary Macrovascular-- no CAD, no CVA,no PAD         Secondary Microvascular--- neuropathy , no diabetic retinopathy, no renal disease       Context--- yearly physical           Diabetes Regimen--none         Lab Results   Component Value Date    HGBA1C 10.70 (H) 08/10/2020           Blood Glucose Readings    Checks once a week     Yesterday afternoon was 200     No lows       Diet-  High carb       Associated Signs/Symptoms       Hyperglycemic Symptoms: increased thirst        Hypoglycemic Episodes: none      Dad has diabetes     Review of Systems  Review of Systems   Constitutional: Negative for activity change, appetite change, chills, fatigue, unexpected weight gain and unexpected weight loss.   HENT: Negative for congestion, dental problem, ear discharge, ear pain, swollen glands, tinnitus, trouble swallowing and voice change.    Eyes: Negative for blurred vision, photophobia, pain, discharge, redness, itching and visual disturbance.   Respiratory: Negative for apnea, cough, choking, chest tightness, shortness of breath and wheezing.    Cardiovascular: Negative for chest pain, palpitations and leg swelling.   Gastrointestinal: Negative for abdominal distention, abdominal pain, constipation, diarrhea, nausea and vomiting.   Endocrine: Negative for cold intolerance, heat intolerance, polydipsia and polyphagia.   Genitourinary: Negative for breast discharge, decreased libido, decreased urine volume and difficulty urinating.   Musculoskeletal: Negative for arthralgias, back pain and neck pain.   Skin: Negative for color change, dry skin and skin lesions.   Allergic/Immunologic: Negative  for environmental allergies, food allergies and immunocompromised state.   Neurological: Negative for dizziness, tremors, weakness, numbness, headache, memory problem and confusion.   Hematological: Negative for adenopathy.   Psychiatric/Behavioral: Negative for agitation, behavioral problems, decreased concentration and depressed mood. The patient is not nervous/anxious.        Wt Readings from Last 3 Encounters:   08/19/20 124 kg (273 lb 8 oz)   08/10/20 126 kg (278 lb)   11/25/19 120 kg (265 lb 3.2 oz)     Temp Readings from Last 3 Encounters:   08/10/20 97.3 °F (36.3 °C) (Temporal)   11/25/19 97.9 °F (36.6 °C) (Temporal)   10/11/19 97.5 °F (36.4 °C) (Temporal)     BP Readings from Last 3 Encounters:   08/19/20 150/90   08/10/20 138/80   11/25/19 133/83     Pulse Readings from Last 3 Encounters:   08/19/20 75   08/10/20 85   11/25/19 77     Physical Exam  Physical Exam   Constitutional: He is oriented to person, place, and time. He appears well-developed and well-nourished. No distress.   HENT:   Head: Normocephalic and atraumatic.   Right Ear: External ear normal.   Left Ear: External ear normal.   Nose: Nose normal.   Eyes: Pupils are equal, round, and reactive to light. Conjunctivae and EOM are normal.   Neck: Normal range of motion. Neck supple. No tracheal deviation present. No thyromegaly present.   Cardiovascular: Normal rate, regular rhythm and normal heart sounds.   No murmur heard.  Pulmonary/Chest: Effort normal and breath sounds normal. No respiratory distress. He has no wheezes.   Abdominal: Soft. Bowel sounds are normal. There is no tenderness. There is no rebound and no guarding.   Musculoskeletal: Normal range of motion. He exhibits no edema, tenderness or deformity.   Neurological: He is alert and oriented to person, place, and time. No cranial nerve deficit.   Skin: Skin is warm and dry. No rash noted.   Psychiatric: He has a normal mood and affect. His behavior is normal. Judgment and thought  content normal.         Assessment/Plan    ICD-10-CM ICD-9-CM   1. Type 2 diabetes mellitus with hyperglycemia, with long-term current use of insulin (CMS/[facility]) E11.65 250.00    Z79.4 790.29     V58.67   2. Mixed hyperlipidemia E78.2 272.2   3. Essential hypertension I10 401.9          Glycemic Management    Diabetes mellitus type 2 not controled due to hyperglycemia       Lab Results   Component Value Date    HGBA1C 10.70 (H) 08/10/2020       Glipizide stopped     Metformin stopped due to side effects      trulicity not affordable    Start Ozempic 0.25 mg once a week for 4 weeks then increase to 0.5 mg once a week    Side effects are nausea    If you are eating and become nauseated stop eatiing     If abdominal pain or vomiting stop medication    Start Toujeo 20 uints once daily     Am sugar should be 80 to 130    If you have an am sugar less than 80 decrease to 16 units       Start Lymjev sliding scale if needed for meals--- this is the fast acting insulin     Blood sugar  Insulin    151-200 2 units  201-250 4 untis  251-300 6 nits  Above 301  8 units     Add Jardiance next visit         Rules of 15 discussed for hypoglycemia         Goals for sugar    Fasting and before meals 80 to 130    2 hours after meals 180 or less      Aim for 60 grams of carbohydrate per meal    Aim for 15 grams of carbohydrate per snack       Lipid Management      Lipitor 40 mg one at night       Lab Results   Component Value Date    CHOL 182 02/10/2017     Lab Results   Component Value Date    TRIG 930 (H) 08/10/2020     Lab Results   Component Value Date    HDL 28 (L) 08/10/2020     Lab Results   Component Value Date    LDL CANCELED 08/10/2020     Lab Results   Component Value Date    VLDL CANCELED 08/10/2020     Lab Results   Component Value Date    LDLHDL  02/10/2017      Comment:      Unable to calculate       Blood Pressure Management    Cozaar 50 mg one daily       Microvascular Complication Monitoring      Last eye exam August 18,  2020  No DR       Neuropathy     On gabapentin 800 mg tid       Lab Results   Component Value Date    MICROALBUR 4.4 08/10/2020       Bone Health      Lab Results   Component Value Date    CALCIUM 9.1 08/10/2020             Other Diabetes Related Aspects      Lab Results   Component Value Date    AGEJEKLZ77 804 11/29/2017     Thyroid health         Lab Results   Component Value Date    TSH 0.836 08/10/2020     Return in about 6 weeks (around 9/30/2020).     Records from  received and reviewed from 2020                  This document has been electronically signed by DAMIAN Mosquera on August 19, 2020 10:10

## 2020-09-28 ENCOUNTER — OFFICE VISIT (OUTPATIENT)
Dept: ENDOCRINOLOGY | Facility: CLINIC | Age: 45
End: 2020-09-28

## 2020-09-28 VITALS
BODY MASS INDEX: 38.14 KG/M2 | HEART RATE: 105 BPM | DIASTOLIC BLOOD PRESSURE: 70 MMHG | WEIGHT: 266.4 LBS | HEIGHT: 70 IN | OXYGEN SATURATION: 98 % | SYSTOLIC BLOOD PRESSURE: 138 MMHG

## 2020-09-28 DIAGNOSIS — Z79.4 TYPE 2 DIABETES MELLITUS WITH HYPERGLYCEMIA, WITH LONG-TERM CURRENT USE OF INSULIN (HCC): Primary | Chronic | ICD-10-CM

## 2020-09-28 DIAGNOSIS — I10 ESSENTIAL HYPERTENSION: ICD-10-CM

## 2020-09-28 DIAGNOSIS — E78.2 MIXED HYPERLIPIDEMIA: ICD-10-CM

## 2020-09-28 DIAGNOSIS — E11.65 TYPE 2 DIABETES MELLITUS WITH HYPERGLYCEMIA, WITH LONG-TERM CURRENT USE OF INSULIN (HCC): Primary | Chronic | ICD-10-CM

## 2020-09-28 PROCEDURE — 99214 OFFICE O/P EST MOD 30 MIN: CPT | Performed by: NURSE PRACTITIONER

## 2020-09-28 RX ORDER — INSULIN GLARGINE 300 U/ML
20 INJECTION, SOLUTION SUBCUTANEOUS DAILY
Qty: 2 PEN | Refills: 11 | Status: SHIPPED | OUTPATIENT
Start: 2020-09-28 | End: 2022-04-15

## 2020-09-28 RX ORDER — SEMAGLUTIDE 1.34 MG/ML
0.5 INJECTION, SOLUTION SUBCUTANEOUS WEEKLY
COMMUNITY
End: 2020-12-08 | Stop reason: SDUPTHER

## 2020-09-28 RX ORDER — SEMAGLUTIDE 1.34 MG/ML
0.5 INJECTION, SOLUTION SUBCUTANEOUS WEEKLY
Qty: 3 PEN | Refills: 11 | Status: SHIPPED | OUTPATIENT
Start: 2020-09-28 | End: 2020-12-29 | Stop reason: SDUPTHER

## 2020-09-28 NOTE — PROGRESS NOTES
Subjective    Trevor Hernandez is a 45 y.o. male. he is here today for follow-up.    History of Present Illness           IN OFFICE VISIT             Referring provider Aixa Deng MD      45 year old male presents for FOLLOW UP      REASON - diabetes         Duration/Timing -diagnosed in 2016/ constant         Quality -not controlled         Severity - moderate due to hyperglycemia         Severity (Complication/Hospitalizations)        Secondary Macrovascular-- no CAD, no CVA,no PAD         Secondary Microvascular--- neuropathy , no diabetic retinopathy, no renal disease         Context--- yearly physical             Diabetes Regimen--none               Lab Results   Component Value Date     HGBA1C 10.70 (H) 08/10/2020               Blood Glucose Readings     Now under 130     States has not had to use the mealtime      No lows         Diet-  High carb         Associated Signs/Symptoms       Hyperglycemic Symptoms: increased thirst        Hypoglycemic Episodes: none        Dad has diabetes          The following portions of the patient's history were reviewed and updated as appropriate:   Past Medical History:   Diagnosis Date   • Chronic back pain    • Diabetes mellitus (CMS/HCC)    • Diabetic neuropathy (CMS/HCC)    • History of migraine headaches    • Hypercholesterolemia    • Hypercholesterolemia    • Hypertension    • Obesity      Past Surgical History:   Procedure Laterality Date   • ANTERIOR LUMBAR EXPOSURE N/A 11/27/2017    Procedure: ANTERIOR LUMBAR EXPOSURE;  Surgeon: Devon Guan DO;  Location: [facility] OR;  Service:    • BACK SURGERY      X 2   • LUMBAR FUSION N/A 11/27/2017    Procedure: ANTERIOR DECOMPRESSION, ANTERIOR LUMBAR INTERBODY FUSION WITH INSTRUMENTATION L5-S1;  Surgeon: SHERI Lunsford MD;  Location: [facility] OR;  Service:    • LUMBAR LAMINECTOMY WITH FUSION N/A 11/29/2017    Procedure: POSTERIOR SPINAL FUSION WITH INSTRUMENTATION L5-S1;  Surgeon: SHERI Lunsford MD;   Location: [facility] OR;  Service:      Family History   Problem Relation Age of Onset   • Diabetes Other    • Heart disease Other    • Diabetes Father    • Heart disease Father        Current Outpatient Medications   Medication Sig Dispense Refill   • atorvastatin (Lipitor) 40 MG tablet Take 1 tablet by mouth Every Night. 90 tablet 3   • butalbital-acetaminophen-caffeine (FIORICET, ESGIC) -40 MG per tablet TAKE 1 TABLET BY MOUTH EVERY 4 HOURS AS NEEDED FOR HEADACHE 10 tablet 0   • gabapentin (NEURONTIN) 800 MG tablet Take 800 mg by mouth 3 (Three) Times a Day.     • Insulin Pen Needle (PEN NEEDLES) 31G X 8 MM misc use as indicated 4 times daily. 120 each 11   • losartan (COZAAR) 50 MG tablet Take 1 tablet by mouth Every Night. 90 tablet 3   • ondansetron (ZOFRAN) 4 MG tablet Take 1 tablet by mouth Every 6 (Six) Hours As Needed for Nausea or Vomiting. 10 tablet 1   • promethazine (PHENERGAN) 25 MG tablet Take 1 tablet by mouth Every 6 (Six) Hours As Needed for Nausea or Vomiting. 25 tablet 0   • Semaglutide,0.25 or 0.5MG/DOS, (Ozempic, 0.25 or 0.5 MG/DOSE,) 2 MG/1.5ML solution pen-injector Inject 0.5 mg under the skin into the appropriate area as directed 1 (One) Time Per Week.     • sertraline (ZOLOFT) 100 MG tablet TAKE 1 TABLET BY MOUTH DAILY 90 tablet 3   • tiZANidine (ZANAFLEX) 4 MG tablet Take 1 tablet by mouth Every 8 (Eight) Hours As Needed for Muscle Spasms.     • Insulin Glargine, 2 Unit Dial, (Toujeo Max SoloStar) 300 UNIT/ML solution pen-injector injection Inject 20 Units under the skin into the appropriate area as directed Daily. 2 pen 11   • Semaglutide,0.25 or 0.5MG/DOS, (Ozempic, 0.25 or 0.5 MG/DOSE,) 2 MG/1.5ML solution pen-injector Inject 0.5 mg under the skin into the appropriate area as directed 1 (One) Time Per Week. 3 pen 11     No current facility-administered medications for this visit.      Allergies   Allergen Reactions   • Ambien [Zolpidem Tartrate] Confusion     Social History  "    Socioeconomic History   • Marital status:      Spouse name: Not on file   • Number of children: Not on file   • Years of education: Not on file   • Highest education level: Not on file   Tobacco Use   • Smoking status: Never Smoker   • Smokeless tobacco: Former User     Types: Chew   Substance and Sexual Activity   • Alcohol use: No   • Drug use: No   • Sexual activity: Defer       Review of Systems  Review of Systems   Constitutional: Negative for activity change, appetite change, diaphoresis and fatigue.   HENT: Negative for facial swelling, sneezing, sore throat, tinnitus, trouble swallowing and voice change.    Eyes: Negative for photophobia, pain, discharge, redness, itching and visual disturbance.   Respiratory: Negative for apnea, cough, choking, chest tightness and shortness of breath.    Cardiovascular: Negative for chest pain, palpitations and leg swelling.   Gastrointestinal: Negative for abdominal distention, abdominal pain, constipation, diarrhea, nausea and vomiting.   Endocrine: Negative for cold intolerance, heat intolerance, polydipsia, polyphagia and polyuria.   Genitourinary: Negative for difficulty urinating, dysuria, frequency, hematuria and urgency.   Musculoskeletal: Negative for arthralgias, back pain, gait problem, joint swelling, myalgias, neck pain and neck stiffness.   Skin: Negative for color change, pallor, rash and wound.   Neurological: Negative for dizziness, tremors, weakness, light-headedness, numbness and headaches.   Hematological: Negative for adenopathy. Does not bruise/bleed easily.   Psychiatric/Behavioral: Negative for behavioral problems, confusion and sleep disturbance.        Objective    /70   Pulse 105   Ht 177.8 cm (70\")   Wt 121 kg (266 lb 6.4 oz)   SpO2 98%   BMI 38.22 kg/m²   Physical Exam  Constitutional:       General: He is not in acute distress.     Appearance: He is well-developed.   HENT:      Head: Normocephalic and atraumatic.      " Right Ear: External ear normal.      Left Ear: External ear normal.      Nose: Nose normal.   Eyes:      Conjunctiva/sclera: Conjunctivae normal.      Pupils: Pupils are equal, round, and reactive to light.   Neck:      Musculoskeletal: Normal range of motion and neck supple.      Thyroid: No thyromegaly.      Trachea: No tracheal deviation.   Cardiovascular:      Rate and Rhythm: Normal rate and regular rhythm.      Heart sounds: Normal heart sounds. No murmur.   Pulmonary:      Effort: Pulmonary effort is normal. No respiratory distress.      Breath sounds: Normal breath sounds. No wheezing.   Abdominal:      General: Bowel sounds are normal.      Palpations: Abdomen is soft.      Tenderness: There is no abdominal tenderness. There is no guarding or rebound.   Musculoskeletal: Normal range of motion.         General: No tenderness or deformity.   Skin:     General: Skin is warm and dry.      Findings: No rash.   Neurological:      Mental Status: He is alert and oriented to person, place, and time.      Cranial Nerves: No cranial nerve deficit.   Psychiatric:         Behavior: Behavior normal.         Thought Content: Thought content normal.         Judgment: Judgment normal.         Lab Review  Glucose (mg/dL)   Date Value   11/30/2017 261 (H)   11/28/2017 238 (H)   11/27/2017 313 (H)     Sodium (mmol/L)   Date Value   08/10/2020 130 (L)   10/11/2019 135 (L)   06/27/2019 131 (L)   11/30/2017 136   11/28/2017 133 (L)   11/27/2017 135     Potassium (mmol/L)   Date Value   08/10/2020 4.7   10/11/2019 5.7 (H)   06/27/2019 5.5 (H)   11/30/2017 4.3   11/28/2017 4.1   11/27/2017 4.9     Chloride (mmol/L)   Date Value   08/10/2020 91 (L)   10/11/2019 95 (L)   06/27/2019 91 (L)   11/30/2017 95 (L)   11/28/2017 92 (L)   11/27/2017 99     CO2 (mmol/L)   Date Value   11/30/2017 33.0 (H)   11/28/2017 30.0   11/27/2017 23.0 (L)     Total CO2 (mmol/L)   Date Value   08/10/2020 25.6   10/11/2019 26.0   06/27/2019 27.9     BUN  (mg/dL)   Date Value   08/10/2020 8   10/11/2019 15   06/27/2019 10   11/30/2017 16   11/28/2017 12   11/27/2017 15     Creatinine (mg/dL)   Date Value   08/10/2020 0.80   10/11/2019 0.97   06/27/2019 0.98   11/30/2017 0.71   11/28/2017 0.82   11/27/2017 0.65     Hemoglobin A1C (%)   Date Value   08/10/2020 10.70 (H)   10/11/2019 9.20 (H)   06/27/2019 10.40 (H)   03/29/2017 9.87 (H)   02/10/2017 9.62 (H)     Triglycerides (mg/dL)   Date Value   08/10/2020 930 (H)   10/11/2019 184 (H)   06/27/2019 599 (H)   02/10/2017 503 (H)     LDL Cholesterol  (mg/dL)   Date Value   08/10/2020 CANCELED   10/11/2019 96   06/27/2019 CANCELED   02/10/2017 77       Assessment/Plan      1. Type 2 diabetes mellitus with hyperglycemia, with long-term current use of insulin (CMS/[facility])    2. Mixed hyperlipidemia    3. Essential hypertension    .    Medications prescribed:  Outpatient Encounter Medications as of 9/28/2020   Medication Sig Dispense Refill   • atorvastatin (Lipitor) 40 MG tablet Take 1 tablet by mouth Every Night. 90 tablet 3   • butalbital-acetaminophen-caffeine (FIORICET, ESGIC) -40 MG per tablet TAKE 1 TABLET BY MOUTH EVERY 4 HOURS AS NEEDED FOR HEADACHE 10 tablet 0   • gabapentin (NEURONTIN) 800 MG tablet Take 800 mg by mouth 3 (Three) Times a Day.     • Insulin Pen Needle (PEN NEEDLES) 31G X 8 MM misc use as indicated 4 times daily. 120 each 11   • losartan (COZAAR) 50 MG tablet Take 1 tablet by mouth Every Night. 90 tablet 3   • ondansetron (ZOFRAN) 4 MG tablet Take 1 tablet by mouth Every 6 (Six) Hours As Needed for Nausea or Vomiting. 10 tablet 1   • promethazine (PHENERGAN) 25 MG tablet Take 1 tablet by mouth Every 6 (Six) Hours As Needed for Nausea or Vomiting. 25 tablet 0   • Semaglutide,0.25 or 0.5MG/DOS, (Ozempic, 0.25 or 0.5 MG/DOSE,) 2 MG/1.5ML solution pen-injector Inject 0.5 mg under the skin into the appropriate area as directed 1 (One) Time Per Week.     • sertraline (ZOLOFT) 100 MG tablet TAKE 1  TABLET BY MOUTH DAILY 90 tablet 3   • tiZANidine (ZANAFLEX) 4 MG tablet Take 1 tablet by mouth Every 8 (Eight) Hours As Needed for Muscle Spasms.     • Insulin Glargine, 2 Unit Dial, (Toujeo Max SoloStar) 300 UNIT/ML solution pen-injector injection Inject 20 Units under the skin into the appropriate area as directed Daily. 2 pen 11   • Semaglutide,0.25 or 0.5MG/DOS, (Ozempic, 0.25 or 0.5 MG/DOSE,) 2 MG/1.5ML solution pen-injector Inject 0.5 mg under the skin into the appropriate area as directed 1 (One) Time Per Week. 3 pen 11     No facility-administered encounter medications on file as of 9/28/2020.        Orders placed during this encounter include:  No orders of the defined types were placed in this encounter.    Glycemic Management     Diabetes mellitus type 2 not controled due to hyperglycemia               Lab Results   Component Value Date     HGBA1C 10.70 (H) 08/10/2020         Glipizide stopped      Metformin stopped due to side effects        trulicity not affordable        Ozempic  0.5 mg once a week     Side effects are nausea     If you are eating and become nauseated stop eatiing      If abdominal pain or vomiting stop medication      Toujeo 20 uints once daily      Am sugar should be 80 to 130     If you have an am sugar less than 80 decrease to 16 units         Lymjev sliding scale if needed for meals -- NOT HAD TO USE        Blood sugar     Insulin     151-200           2 units  201-250           4 untis  251-300           6 nits  Above 301       8 units      Add Jardiance next visit --he did not want to add any new medications today            Rules of 15 discussed for hypoglycemia            Goals for sugar     Fasting and before meals 80 to 130     2 hours after meals 180 or less        Aim for 60 grams of carbohydrate per meal     Aim for 15 grams of carbohydrate per snack         Lipid Management        Lipitor 40 mg one at night               Lab Results   Component Value Date     CHOL 182  02/10/2017            Lab Results   Component Value Date     TRIG 930 (H) 08/10/2020            Lab Results   Component Value Date     HDL 28 (L) 08/10/2020            Lab Results   Component Value Date     LDL CANCELED 08/10/2020            Lab Results   Component Value Date     VLDL CANCELED 08/10/2020              Lab Results   Component Value Date     LDLHDL   02/10/2017         Comment:         Unable to calculate         Blood Pressure Management     Cozaar 50 mg one daily         Microvascular Complication Monitoring        Last eye exam August 18, 2020  No DR         Neuropathy      On gabapentin 800 mg tid               Lab Results   Component Value Date     MICROALBUR 4.4 08/10/2020         Bone Health              Lab Results   Component Value Date     CALCIUM 9.1 08/10/2020                  Other Diabetes Related Aspects              Lab Results   Component Value Date     [id number] 804 11/29/2017      Thyroid health                  Lab Results   Component Value Date     TSH 0.836 08/10/2020        4. Follow-up: Return in about 3 months (around 12/28/2020) for Recheck.

## 2020-10-01 ENCOUNTER — TELEPHONE (OUTPATIENT)
Dept: FAMILY MEDICINE CLINIC | Facility: CLINIC | Age: 45
End: 2020-10-01

## 2020-11-25 ENCOUNTER — TELEPHONE (OUTPATIENT)
Dept: FAMILY MEDICINE CLINIC | Facility: CLINIC | Age: 45
End: 2020-11-25

## 2020-11-25 NOTE — TELEPHONE ENCOUNTER
Verónica called and she tested positive Monday.  Patient has quarantined.  Patient now has body aches, fatigue, congestion, did have fever but none today.

## 2020-11-25 NOTE — TELEPHONE ENCOUNTER
Needs to be tested. And start pepcid 20 twice a day, vitamin d 1000 units, zinc 220 mg a day and mucinex twice a day.

## 2020-11-25 NOTE — TELEPHONE ENCOUNTER
The benefit is that if he is positive, he has to isolate 10 days from the time of his positive test.  If he is not tested, he has to quarantine for 14 days from the time of his last exposure to her during her period of isolation. If he has a known positive it can cut down on the time he has to remain isolated/quarantined.

## 2020-11-25 NOTE — TELEPHONE ENCOUNTER
Wife advised.  She states when health dept contacted her yesterday his symptoms had started and they counted him as a positive case.  He was given date to quarantine til.

## 2020-11-25 NOTE — TELEPHONE ENCOUNTER
Wife advised.  She is going to talk to patient about getting tested.  Does not feel that is necessary since she has had two positive tests.

## 2020-12-01 ENCOUNTER — TELEPHONE (OUTPATIENT)
Dept: FAMILY MEDICINE CLINIC | Facility: CLINIC | Age: 45
End: 2020-12-01

## 2020-12-01 DIAGNOSIS — G44.211 INTRACTABLE EPISODIC TENSION-TYPE HEADACHE: ICD-10-CM

## 2020-12-01 RX ORDER — BUTALBITAL, ACETAMINOPHEN AND CAFFEINE 50; 325; 40 MG/1; MG/1; MG/1
TABLET ORAL
Qty: 10 TABLET | Refills: 2 | Status: SHIPPED | OUTPATIENT
Start: 2020-12-01 | End: 2020-12-13 | Stop reason: HOSPADM

## 2020-12-01 NOTE — TELEPHONE ENCOUNTER
Patient went to ER Monday, tested positive for COVID.  Patient received fluids. Received script for steroids.  Called on 11/25/20 and was advised to start vit d, zinc, pepcid, and mucinex. Patient still having chills, weak.  Was doing better until this morning.  Do they just need to monitor or anything else they need to do?      Verónica was released by health dept yesterday.

## 2020-12-08 ENCOUNTER — OFFICE VISIT (OUTPATIENT)
Dept: FAMILY MEDICINE CLINIC | Facility: CLINIC | Age: 45
End: 2020-12-08

## 2020-12-08 ENCOUNTER — HOSPITAL ENCOUNTER (INPATIENT)
Facility: HOSPITAL | Age: 45
LOS: 5 days | Discharge: HOME | End: 2020-12-13
Attending: FAMILY MEDICINE | Admitting: FAMILY MEDICINE

## 2020-12-08 ENCOUNTER — APPOINTMENT (OUTPATIENT)
Dept: GENERAL RADIOLOGY | Facility: HOSPITAL | Age: 45
End: 2020-12-08

## 2020-12-08 VITALS — WEIGHT: 250 LBS | TEMPERATURE: 98.6 F | HEIGHT: 70 IN | BODY MASS INDEX: 35.79 KG/M2

## 2020-12-08 DIAGNOSIS — R52 PAIN: Primary | ICD-10-CM

## 2020-12-08 DIAGNOSIS — J96.01 ACUTE RESPIRATORY FAILURE WITH HYPOXIA (HCC): ICD-10-CM

## 2020-12-08 DIAGNOSIS — J12.82 PNEUMONIA DUE TO COVID-19 VIRUS: Primary | ICD-10-CM

## 2020-12-08 DIAGNOSIS — U07.1 PNEUMONIA DUE TO COVID-19 VIRUS: Primary | ICD-10-CM

## 2020-12-08 LAB
ALBUMIN SERPL-MCNC: 3.3 G/DL (ref 3.5–5.2)
ALBUMIN SERPL-MCNC: 3.3 G/DL (ref 3.5–5.2)
ALBUMIN/GLOB SERPL: 0.7 G/DL
ALP SERPL-CCNC: 73 U/L (ref 39–117)
ALP SERPL-CCNC: 73 U/L (ref 39–117)
ALT SERPL W P-5'-P-CCNC: 29 U/L (ref 1–41)
ALT SERPL W P-5'-P-CCNC: 30 U/L (ref 1–41)
ANION GAP SERPL CALCULATED.3IONS-SCNC: 15 MMOL/L (ref 5–15)
AST SERPL-CCNC: 24 U/L (ref 1–40)
AST SERPL-CCNC: 25 U/L (ref 1–40)
BASOPHILS # BLD AUTO: 0.01 10*3/MM3 (ref 0–0.2)
BASOPHILS NFR BLD AUTO: 0.1 % (ref 0–1.5)
BILIRUB CONJ SERPL-MCNC: 0.2 MG/DL (ref 0–0.3)
BILIRUB INDIRECT SERPL-MCNC: 0.5 MG/DL
BILIRUB SERPL-MCNC: 0.7 MG/DL (ref 0–1.2)
BILIRUB SERPL-MCNC: 0.7 MG/DL (ref 0–1.2)
BUN SERPL-MCNC: 19 MG/DL (ref 6–20)
BUN/CREAT SERPL: 29.7 (ref 7–25)
CALCIUM SPEC-SCNC: 9 MG/DL (ref 8.6–10.5)
CHLORIDE SERPL-SCNC: 96 MMOL/L (ref 98–107)
CO2 SERPL-SCNC: 21 MMOL/L (ref 22–29)
CREAT SERPL-MCNC: 0.64 MG/DL (ref 0.76–1.27)
CREAT SERPL-MCNC: 0.7 MG/DL (ref 0.76–1.27)
D-DIMER, QUANTITATIVE (MAD,POW, STR): 1193 NG/ML (FEU) (ref 0–470)
D-LACTATE SERPL-SCNC: 1.8 MMOL/L (ref 0.5–2)
DEPRECATED RDW RBC AUTO: 38.5 FL (ref 37–54)
EOSINOPHIL # BLD AUTO: 0.17 10*3/MM3 (ref 0–0.4)
EOSINOPHIL # BLD MANUAL: 0.76 10*3/MM3 (ref 0–0.4)
EOSINOPHIL NFR BLD AUTO: 1.1 % (ref 0.3–6.2)
EOSINOPHIL NFR BLD MANUAL: 5 % (ref 0.3–6.2)
ERYTHROCYTE [DISTWIDTH] IN BLOOD BY AUTOMATED COUNT: 12.9 % (ref 12.3–15.4)
FERRITIN SERPL-MCNC: 1490 NG/ML (ref 30–400)
GFR SERPL CREATININE-BSD FRML MDRD: 123 ML/MIN/1.73
GFR SERPL CREATININE-BSD FRML MDRD: 136 ML/MIN/1.73
GLOBULIN UR ELPH-MCNC: 4.5 GM/DL
GLUCOSE BLDC GLUCOMTR-MCNC: 225 MG/DL (ref 70–130)
GLUCOSE SERPL-MCNC: 229 MG/DL (ref 65–99)
HCT VFR BLD AUTO: 44.6 % (ref 37.5–51)
HGB BLD-MCNC: 15.4 G/DL (ref 13–17.7)
HOLD SPECIMEN: NORMAL
HOLD SPECIMEN: NORMAL
IMM GRANULOCYTES # BLD AUTO: 1.23 10*3/MM3 (ref 0–0.05)
IMM GRANULOCYTES NFR BLD AUTO: 8.1 % (ref 0–0.5)
LDH SERPL-CCNC: 343 U/L (ref 135–225)
LYMPHOCYTES # BLD AUTO: 2.33 10*3/MM3 (ref 0.7–3.1)
LYMPHOCYTES # BLD MANUAL: 1.81 10*3/MM3 (ref 0.7–3.1)
LYMPHOCYTES NFR BLD AUTO: 15.4 % (ref 19.6–45.3)
LYMPHOCYTES NFR BLD MANUAL: 12 % (ref 19.6–45.3)
LYMPHOCYTES NFR BLD MANUAL: 9 % (ref 5–12)
MCH RBC QN AUTO: 28.5 PG (ref 26.6–33)
MCHC RBC AUTO-ENTMCNC: 34.5 G/DL (ref 31.5–35.7)
MCV RBC AUTO: 82.6 FL (ref 79–97)
METAMYELOCYTES NFR BLD MANUAL: 6 % (ref 0–0)
MONOCYTES # BLD AUTO: 1.18 10*3/MM3 (ref 0.1–0.9)
MONOCYTES # BLD AUTO: 1.36 10*3/MM3 (ref 0.1–0.9)
MONOCYTES NFR BLD AUTO: 7.8 % (ref 5–12)
MYELOCYTES NFR BLD MANUAL: 4 % (ref 0–0)
NEUTROPHILS # BLD AUTO: 9.51 10*3/MM3 (ref 1.7–7)
NEUTROPHILS NFR BLD AUTO: 10.18 10*3/MM3 (ref 1.7–7)
NEUTROPHILS NFR BLD AUTO: 67.5 % (ref 42.7–76)
NEUTROPHILS NFR BLD MANUAL: 59 % (ref 42.7–76)
NEUTS BAND NFR BLD MANUAL: 4 % (ref 0–5)
NRBC BLD AUTO-RTO: 0 /100 WBC (ref 0–0.2)
NT-PROBNP SERPL-MCNC: 35.5 PG/ML (ref 0–450)
PLAT MORPH BLD: NORMAL
PLATELET # BLD AUTO: 542 10*3/MM3 (ref 140–450)
PMV BLD AUTO: 8.8 FL (ref 6–12)
POTASSIUM SERPL-SCNC: 4.2 MMOL/L (ref 3.5–5.2)
PROCALCITONIN SERPL-MCNC: 0.08 NG/ML (ref 0–0.25)
PROCALCITONIN SERPL-MCNC: 0.08 NG/ML (ref 0–0.25)
PROMYELOCYTES NFR BLD MANUAL: 1 % (ref 0–0)
PROT SERPL-MCNC: 7.7 G/DL (ref 6–8.5)
PROT SERPL-MCNC: 7.8 G/DL (ref 6–8.5)
RBC # BLD AUTO: 5.4 10*6/MM3 (ref 4.14–5.8)
RBC MORPH BLD: NORMAL
SARS-COV-2 N GENE RESP QL NAA+PROBE: DETECTED
SODIUM SERPL-SCNC: 132 MMOL/L (ref 136–145)
TROPONIN T SERPL-MCNC: <0.01 NG/ML (ref 0–0.03)
WBC # BLD AUTO: 15.1 10*3/MM3 (ref 3.4–10.8)
WBC MORPH BLD: NORMAL
WHOLE BLOOD HOLD SPECIMEN: NORMAL
WHOLE BLOOD HOLD SPECIMEN: NORMAL

## 2020-12-08 PROCEDURE — 84484 ASSAY OF TROPONIN QUANT: CPT | Performed by: FAMILY MEDICINE

## 2020-12-08 PROCEDURE — 99441 PR PHYS/QHP TELEPHONE EVALUATION 5-10 MIN: CPT | Performed by: FAMILY MEDICINE

## 2020-12-08 PROCEDURE — 85379 FIBRIN DEGRADATION QUANT: CPT | Performed by: FAMILY MEDICINE

## 2020-12-08 PROCEDURE — 93010 ELECTROCARDIOGRAM REPORT: CPT | Performed by: INTERNAL MEDICINE

## 2020-12-08 PROCEDURE — 82565 ASSAY OF CREATININE: CPT | Performed by: FAMILY MEDICINE

## 2020-12-08 PROCEDURE — 93005 ELECTROCARDIOGRAM TRACING: CPT

## 2020-12-08 PROCEDURE — 80053 COMPREHEN METABOLIC PANEL: CPT | Performed by: FAMILY MEDICINE

## 2020-12-08 PROCEDURE — 99284 EMERGENCY DEPT VISIT MOD MDM: CPT

## 2020-12-08 PROCEDURE — 82728 ASSAY OF FERRITIN: CPT | Performed by: FAMILY MEDICINE

## 2020-12-08 PROCEDURE — 83605 ASSAY OF LACTIC ACID: CPT | Performed by: FAMILY MEDICINE

## 2020-12-08 PROCEDURE — 63710000001 INSULIN DETEMIR PER 5 UNITS: Performed by: FAMILY MEDICINE

## 2020-12-08 PROCEDURE — 82962 GLUCOSE BLOOD TEST: CPT

## 2020-12-08 PROCEDURE — 80076 HEPATIC FUNCTION PANEL: CPT | Performed by: FAMILY MEDICINE

## 2020-12-08 PROCEDURE — 85025 COMPLETE CBC W/AUTO DIFF WBC: CPT | Performed by: FAMILY MEDICINE

## 2020-12-08 PROCEDURE — 63710000001 DEXAMETHASONE PER 0.25 MG: Performed by: FAMILY MEDICINE

## 2020-12-08 PROCEDURE — 71045 X-RAY EXAM CHEST 1 VIEW: CPT

## 2020-12-08 PROCEDURE — 87635 SARS-COV-2 COVID-19 AMP PRB: CPT | Performed by: FAMILY MEDICINE

## 2020-12-08 PROCEDURE — 87040 BLOOD CULTURE FOR BACTERIA: CPT | Performed by: FAMILY MEDICINE

## 2020-12-08 PROCEDURE — 36415 COLL VENOUS BLD VENIPUNCTURE: CPT | Performed by: FAMILY MEDICINE

## 2020-12-08 PROCEDURE — 84145 PROCALCITONIN (PCT): CPT | Performed by: FAMILY MEDICINE

## 2020-12-08 PROCEDURE — 93005 ELECTROCARDIOGRAM TRACING: CPT | Performed by: FAMILY MEDICINE

## 2020-12-08 PROCEDURE — XW033E5 INTRODUCTION OF REMDESIVIR ANTI-INFECTIVE INTO PERIPHERAL VEIN, PERCUTANEOUS APPROACH, NEW TECHNOLOGY GROUP 5: ICD-10-PCS | Performed by: FAMILY MEDICINE

## 2020-12-08 PROCEDURE — 83880 ASSAY OF NATRIURETIC PEPTIDE: CPT | Performed by: FAMILY MEDICINE

## 2020-12-08 PROCEDURE — 63710000001 INSULIN ASPART PER 5 UNITS: Performed by: FAMILY MEDICINE

## 2020-12-08 PROCEDURE — 25010000002 ENOXAPARIN PER 10 MG: Performed by: FAMILY MEDICINE

## 2020-12-08 PROCEDURE — 85007 BL SMEAR W/DIFF WBC COUNT: CPT | Performed by: FAMILY MEDICINE

## 2020-12-08 PROCEDURE — 83615 LACTATE (LD) (LDH) ENZYME: CPT | Performed by: FAMILY MEDICINE

## 2020-12-08 RX ORDER — ALBUTEROL SULFATE 90 UG/1
2 AEROSOL, METERED RESPIRATORY (INHALATION)
Status: DISCONTINUED | OUTPATIENT
Start: 2020-12-08 | End: 2020-12-13 | Stop reason: HOSPADM

## 2020-12-08 RX ORDER — ONDANSETRON 2 MG/ML
4 INJECTION INTRAMUSCULAR; INTRAVENOUS EVERY 6 HOURS PRN
Status: DISCONTINUED | OUTPATIENT
Start: 2020-12-08 | End: 2020-12-13 | Stop reason: HOSPADM

## 2020-12-08 RX ORDER — DEXTROSE MONOHYDRATE 25 G/50ML
25 INJECTION, SOLUTION INTRAVENOUS
Status: DISCONTINUED | OUTPATIENT
Start: 2020-12-08 | End: 2020-12-13 | Stop reason: HOSPADM

## 2020-12-08 RX ORDER — LOSARTAN POTASSIUM 50 MG/1
50 TABLET ORAL NIGHTLY
Status: DISCONTINUED | OUTPATIENT
Start: 2020-12-08 | End: 2020-12-13 | Stop reason: HOSPADM

## 2020-12-08 RX ORDER — ONDANSETRON 4 MG/1
4 TABLET, FILM COATED ORAL EVERY 6 HOURS PRN
Status: DISCONTINUED | OUTPATIENT
Start: 2020-12-08 | End: 2020-12-13 | Stop reason: HOSPADM

## 2020-12-08 RX ORDER — ACETAMINOPHEN 160 MG/5ML
650 SOLUTION ORAL EVERY 4 HOURS PRN
Status: DISCONTINUED | OUTPATIENT
Start: 2020-12-08 | End: 2020-12-08

## 2020-12-08 RX ORDER — SODIUM CHLORIDE 0.9 % (FLUSH) 0.9 %
10 SYRINGE (ML) INJECTION EVERY 12 HOURS SCHEDULED
Status: DISCONTINUED | OUTPATIENT
Start: 2020-12-08 | End: 2020-12-13 | Stop reason: HOSPADM

## 2020-12-08 RX ORDER — ATORVASTATIN CALCIUM 40 MG/1
40 TABLET, FILM COATED ORAL NIGHTLY
Status: DISCONTINUED | OUTPATIENT
Start: 2020-12-08 | End: 2020-12-13 | Stop reason: HOSPADM

## 2020-12-08 RX ORDER — GABAPENTIN 400 MG/1
800 CAPSULE ORAL EVERY 8 HOURS SCHEDULED
Status: DISCONTINUED | OUTPATIENT
Start: 2020-12-08 | End: 2020-12-13 | Stop reason: HOSPADM

## 2020-12-08 RX ORDER — ACETAMINOPHEN 650 MG/1
650 SUPPOSITORY RECTAL EVERY 4 HOURS PRN
Status: DISCONTINUED | OUTPATIENT
Start: 2020-12-08 | End: 2020-12-13 | Stop reason: HOSPADM

## 2020-12-08 RX ORDER — BENZONATATE 100 MG/1
200 CAPSULE ORAL 3 TIMES DAILY PRN
Status: DISCONTINUED | OUTPATIENT
Start: 2020-12-08 | End: 2020-12-13 | Stop reason: HOSPADM

## 2020-12-08 RX ORDER — BISACODYL 10 MG
10 SUPPOSITORY, RECTAL RECTAL DAILY PRN
Status: DISCONTINUED | OUTPATIENT
Start: 2020-12-08 | End: 2020-12-13 | Stop reason: HOSPADM

## 2020-12-08 RX ORDER — DIPHENOXYLATE HYDROCHLORIDE AND ATROPINE SULFATE 2.5; .025 MG/1; MG/1
1 TABLET ORAL 4 TIMES DAILY PRN
COMMUNITY
End: 2020-12-13 | Stop reason: HOSPADM

## 2020-12-08 RX ORDER — ACETAMINOPHEN 325 MG/1
650 TABLET ORAL EVERY 4 HOURS PRN
Status: DISCONTINUED | OUTPATIENT
Start: 2020-12-08 | End: 2020-12-13 | Stop reason: HOSPADM

## 2020-12-08 RX ORDER — NICOTINE POLACRILEX 4 MG
15 LOZENGE BUCCAL
Status: DISCONTINUED | OUTPATIENT
Start: 2020-12-08 | End: 2020-12-13 | Stop reason: HOSPADM

## 2020-12-08 RX ORDER — CALCIUM CARBONATE 200(500)MG
2 TABLET,CHEWABLE ORAL 2 TIMES DAILY PRN
Status: DISCONTINUED | OUTPATIENT
Start: 2020-12-08 | End: 2020-12-13 | Stop reason: HOSPADM

## 2020-12-08 RX ORDER — DOCUSATE SODIUM 100 MG/1
100 CAPSULE, LIQUID FILLED ORAL 2 TIMES DAILY
Status: DISCONTINUED | OUTPATIENT
Start: 2020-12-08 | End: 2020-12-13 | Stop reason: HOSPADM

## 2020-12-08 RX ORDER — SODIUM CHLORIDE 0.9 % (FLUSH) 0.9 %
10 SYRINGE (ML) INJECTION AS NEEDED
Status: DISCONTINUED | OUTPATIENT
Start: 2020-12-08 | End: 2020-12-13 | Stop reason: HOSPADM

## 2020-12-08 RX ORDER — OXYCODONE AND ACETAMINOPHEN 10; 325 MG/1; MG/1
1 TABLET ORAL EVERY 6 HOURS
COMMUNITY
Start: 2020-11-02 | End: 2022-04-15

## 2020-12-08 RX ORDER — DEXAMETHASONE SODIUM PHOSPHATE 4 MG/ML
6 INJECTION, SOLUTION INTRA-ARTICULAR; INTRALESIONAL; INTRAMUSCULAR; INTRAVENOUS; SOFT TISSUE
Status: DISCONTINUED | OUTPATIENT
Start: 2020-12-08 | End: 2020-12-13 | Stop reason: HOSPADM

## 2020-12-08 RX ORDER — OXYCODONE AND ACETAMINOPHEN 10; 325 MG/1; MG/1
1 TABLET ORAL EVERY 6 HOURS
Status: DISCONTINUED | OUTPATIENT
Start: 2020-12-08 | End: 2020-12-09

## 2020-12-08 RX ORDER — DEXAMETHASONE 1 MG
6 TABLET ORAL DAILY
COMMUNITY
End: 2020-12-13 | Stop reason: HOSPADM

## 2020-12-08 RX ADMIN — LOSARTAN POTASSIUM 50 MG: 50 TABLET, FILM COATED ORAL at 20:08

## 2020-12-08 RX ADMIN — OXYCODONE HYDROCHLORIDE AND ACETAMINOPHEN 1 TABLET: 10; 325 TABLET ORAL at 18:30

## 2020-12-08 RX ADMIN — INSULIN ASPART 3 UNITS: 100 INJECTION, SOLUTION INTRAVENOUS; SUBCUTANEOUS at 18:30

## 2020-12-08 RX ADMIN — ALBUTEROL SULFATE 2 PUFF: 90 AEROSOL, METERED RESPIRATORY (INHALATION) at 20:07

## 2020-12-08 RX ADMIN — SODIUM CHLORIDE, PRESERVATIVE FREE 10 ML: 5 INJECTION INTRAVENOUS at 20:10

## 2020-12-08 RX ADMIN — DOCUSATE SODIUM 100 MG: 100 CAPSULE, LIQUID FILLED ORAL at 20:08

## 2020-12-08 RX ADMIN — DEXAMETHASONE 6 MG: 2 TABLET ORAL at 20:07

## 2020-12-08 RX ADMIN — INSULIN DETEMIR 20 UNITS: 100 INJECTION, SOLUTION SUBCUTANEOUS at 21:23

## 2020-12-08 RX ADMIN — ENOXAPARIN SODIUM 40 MG: 40 INJECTION SUBCUTANEOUS at 18:30

## 2020-12-08 RX ADMIN — GABAPENTIN 800 MG: 400 CAPSULE ORAL at 20:08

## 2020-12-08 RX ADMIN — REMDESIVIR 200 MG: 100 INJECTION, POWDER, LYOPHILIZED, FOR SOLUTION INTRAVENOUS at 18:50

## 2020-12-08 RX ADMIN — ATORVASTATIN CALCIUM 40 MG: 40 TABLET, FILM COATED ORAL at 20:08

## 2020-12-08 NOTE — PLAN OF CARE
Problem: Adult Inpatient Plan of Care  Goal: Plan of Care Review  Outcome: Met  Flowsheets (Taken 12/8/2020 8908)  Progress: no change  Plan of Care Reviewed With: patient  Outcome Summary: new admit. vss.   Goal Outcome Evaluation:  Plan of Care Reviewed With: patient  Progress: no change  Outcome Summary: new admit. vss.

## 2020-12-08 NOTE — PROGRESS NOTES
Subjective   Trevor Hernandez is a 45 y.o. male.     Wife and patient were COVID-19 positive around November 25-wife is back at work and this patient is having exertional chest pain on the left.  He was seen in the [facility] ER approximately a week ago and had a hazy infiltrate in his left chest.  He has not been seen in the office or here.  As far as I know he has been on vitamin D zinc and Pepcid.    Pain  This is a new problem. The current episode started in the past 7 days. The problem occurs daily. The problem has been waxing and waning. Associated symptoms include chest pain. Associated symptoms comments: Left-sided chest pleuritic pain and possible hazy infiltrate week or 10 days ago. The symptoms are aggravated by walking (Covid 19+ around November 25). He has tried nothing for the symptoms.       The following portions of the patient's history were reviewed and updated as appropriate: allergies, current medications, past family history, past medical history, past social history, past surgical history and problem list.    Review of Systems   Constitutional:        Reportedly-no fever but doubt the accuracy   Respiratory: Positive for shortness of breath.    Cardiovascular: Positive for chest pain.        Left-sided pleuritic in nature-activity aggravated       Objective   Physical Exam  Nursing note reviewed.   Constitutional:       Appearance: He is obese.   Pulmonary:      Comments: Sounds restless  Neurological:      Mental Status: He is oriented to person, place, and time.   Psychiatric:         Mood and Affect: Mood normal.         Thought Content: Thought content normal.         Assessment/Plan   Diagnoses and all orders for this visit:    1. Pain (Primary)       Advised ER visit at [facility]-high concern for pneumonia complicating COVID-19    This visit has been rescheduled as a phone visit to comply with patient safety concerns in accordance with CDC recommendations.  Total time of discussion was 10 minutes.

## 2020-12-08 NOTE — H&P
[facility] Medicine Admission      Date of Admission: 12/8/2020      Primary Care Physician: Efrem Deng MD      Chief Complaint: Shortness of breath    HPI: Patient is a 45-year-old male with past medical history notable for diabetes mellitus, hyperlipidemia, hypertension, and chronic pain.  Patient presented to the emergency department due to worsening dyspnea.  Per patient report he had tested positive for COVID-19 at outside facility prior to arrival today.  He states that he first tested positive for Thanksgiving and his wife was positive prior to that.  He notes he has been having worsening shortness of breath with exertion the last 2 to 3 days now.  He notes that he had cough initially but this seems improved today.  He also had some initial nausea and vomiting but this is also better.  He also noted some difficulties with headache but that also seems somewhat improved.  He denies any body aches.  He also notes that he has had issues with his sense of taste and smell.    Patient was noted to be hypoxic on room air to 88% so he was placed on supplemental oxygen in the ED.  Chest x-ray was read as concerning for bilateral infiltrative changes consistent with Covid pneumonitis.  CBC showed a WBC of 15.1 with normal hemoglobin and hematocrit.  He did have a thrombosed cytosis with platelets of 2/5/1941.  Lactate, troponin T, and proBNP were all normal.  CMP shows a glucose of 229, creatinine 1.64, sodium 132, chloride 96, CO2 21, albumin 3.3.  Blood cultures were collected in the emergency department.    Concurrent Medical History:  has a past medical history of Chronic back pain, Diabetes mellitus (CMS/[facility]), Diabetic neuropathy (CMS/[facility]), History of migraine headaches, Hypercholesterolemia, Hypercholesterolemia, Hypertension, and Obesity.    Past Surgical History:  has a past surgical history that includes Back surgery; lumbar laminectomy with fusion (N/A,  11/29/2017); Lumbar fusion (N/A, 11/27/2017); and Anterior Lumbar Exposure (N/A, 11/27/2017).    Family History: family history includes Diabetes in his father and another family member; Heart disease in his father and another family member.  No changes    Social History:  reports that he has never smoked. He has quit using smokeless tobacco.  His smokeless tobacco use included chew. He reports that he does not drink alcohol or use drugs.    Allergies:   Allergies   Allergen Reactions   • Ambien [Zolpidem Tartrate] Confusion       Medications:   Prior to Admission medications    Medication Sig Start Date End Date Taking? Authorizing Provider   atorvastatin (Lipitor) 40 MG tablet Take 1 tablet by mouth Every Night. 8/14/20   Efrem Deng MD   butalbital-acetaminophen-caffeine (FIORICET, ESGIC) -40 MG per tablet TAKE 1 TABLET BY MOUTH EVERY 4 HOURS AS NEEDED FOR HEADACHE 12/1/20   Efrem Deng MD   gabapentin (NEURONTIN) 800 MG tablet Take 800 mg by mouth 3 (Three) Times a Day.    Provider, MD Chantal   Insulin Glargine, 2 Unit Dial, (Toujeo Max SoloStar) 300 UNIT/ML solution pen-injector injection Inject 20 Units under the skin into the appropriate area as directed Daily. 9/28/20   Ronak Nieves APRN   Insulin Pen Needle (PEN NEEDLES) 31G X 8 MM misc use as indicated 4 times daily. 2/14/17   Ruben Reid MD   losartan (COZAAR) 50 MG tablet Take 1 tablet by mouth Every Night. 8/19/20   Jie Beatty APRN   ondansetron (ZOFRAN) 4 MG tablet Take 1 tablet by mouth Every 6 (Six) Hours As Needed for Nausea or Vomiting. 2/8/19   Efrem Deng MD   oxyCODONE-acetaminophen (PERCOCET)  MG per tablet Take 1 tablet by mouth Every 6 (Six) Hours. 11/2/20   Chantal Arriaga MD   promethazine (PHENERGAN) 25 MG tablet Take 1 tablet by mouth Every 6 (Six) Hours As Needed for Nausea or Vomiting. 2/11/20   Efrem Deng MD   Semaglutide,0.25 or  0.5MG/DOS, (Ozempic, 0.25 or 0.5 MG/DOSE,) 2 MG/1.5ML solution pen-injector Inject 0.5 mg under the skin into the appropriate area as directed 1 (One) Time Per Week.    Provider, MD Chantal   Semaglutide,0.25 or 0.5MG/DOS, (Ozempic, 0.25 or 0.5 MG/DOSE,) 2 MG/1.5ML solution pen-injector Inject 0.5 mg under the skin into the appropriate area as directed 1 (One) Time Per Week. 9/28/20   Ronak Nieves APRN   sertraline (ZOLOFT) 100 MG tablet TAKE 1 TABLET BY MOUTH DAILY 1/13/20   Efrem Deng MD   tiZANidine (ZANAFLEX) 4 MG tablet Take 1 tablet by mouth Every 8 (Eight) Hours As Needed for Muscle Spasms. 11/30/17   SHERI Lunsford MD       Review of Systems:  Review of Systems   Constitutional: Negative for chills and fever.   HENT: Negative for congestion.    Eyes: Negative for visual disturbance.   Respiratory: Positive for cough and shortness of breath.    Cardiovascular: Negative for chest pain.   Gastrointestinal: Positive for nausea and vomiting. Negative for abdominal pain, constipation and diarrhea.   Genitourinary: Negative for difficulty urinating.   Musculoskeletal: Positive for back pain. Negative for arthralgias.   Skin: Negative for rash.   Neurological: Positive for headaches. Negative for weakness.   Psychiatric/Behavioral: Negative.    All other systems reviewed and are negative.     Otherwise complete ROS is negative except as mentioned above.    Physical Exam:   Temp:  [98 °F (36.7 °C)-98.6 °F (37 °C)] 98 °F (36.7 °C)  Heart Rate:  [86-95] 92  Resp:  [20] 20  BP: (122-135)/(79-86) 131/79  Physical Exam  Constitutional:       Comments: Mildly ill-appearing but in no distress   HENT:      Head: Normocephalic and atraumatic.      Right Ear: External ear normal.      Left Ear: External ear normal.      Nose: Nose normal.      Mouth/Throat:      Mouth: Mucous membranes are moist.      Pharynx: Oropharynx is clear.   Eyes:      Extraocular Movements: Extraocular movements  intact.      Conjunctiva/sclera: Conjunctivae normal.   Neck:      Musculoskeletal: No muscular tenderness.   Cardiovascular:      Rate and Rhythm: Normal rate and regular rhythm.      Pulses: Normal pulses.      Heart sounds: Normal heart sounds.   Pulmonary:      Effort: Pulmonary effort is normal. No respiratory distress.      Breath sounds: Normal breath sounds.   Abdominal:      General: Bowel sounds are normal.      Palpations: Abdomen is soft.      Tenderness: There is no abdominal tenderness.   Skin:     General: Skin is warm and dry.      Capillary Refill: Capillary refill takes less than 2 seconds.   Neurological:      General: No focal deficit present.      Mental Status: He is alert and oriented to person, place, and time. Mental status is at baseline.      Coordination: Coordination normal.   Psychiatric:         Mood and Affect: Mood normal.         Behavior: Behavior normal.           Results Reviewed:  I have personally reviewed current lab, radiology, and data and agree with results.  Lab Results (last 24 hours)     Procedure Component Value Units Date/Time    Troponin [[id number]]  (Normal) Collected: 12/08/20 1351    Specimen: Blood Updated: 12/08/20 1422     Troponin T <0.010 ng/mL     Narrative:      Troponin T Reference Range:  <= 0.03 ng/mL-   Negative for AMI  >0.03 ng/mL-     Abnormal for myocardial necrosis.  Clinicians would have to utilize clinical acumen, EKG, Troponin and serial changes to determine if it is an Acute Myocardial Infarction or myocardial injury due to an underlying chronic condition.       Results may be falsely decreased if patient taking Biotin.      BNP [[id number]]  (Normal) Collected: 12/08/20 1351    Specimen: Blood Updated: 12/08/20 1420     proBNP 35.5 pg/mL     Narrative:      Among patients with dyspnea, NT-proBNP is highly sensitive for the detection of acute congestive heart failure. In addition NT-proBNP of <300 pg/ml effectively rules out acute congestive  heart failure with 99% negative predictive value.    Results may be falsely decreased if patient taking Biotin.      Lactic Acid, Plasma [[id number]]  (Normal) Collected: 12/08/20 1351    Specimen: Blood Updated: 12/08/20 1419     Lactate 1.8 mmol/L     CBC & Differential [[id number]]  (Abnormal) Collected: 12/08/20 1351    Specimen: Blood Updated: 12/08/20 1408    Narrative:      The following orders were created for panel order CBC & Differential.  Procedure                               Abnormality         Status                     ---------                               -----------         ------                     Scan Slide[[id number]]                                       In process                 CBC Auto Differential[[id number]]        Abnormal            Final result                 Please view results for these tests on the individual orders.    CBC Auto Differential [[id number]]  (Abnormal) Collected: 12/08/20 1351    Specimen: Blood Updated: 12/08/20 1408     WBC 15.10 10*3/mm3      RBC 5.40 10*6/mm3      Hemoglobin 15.4 g/dL      Hematocrit 44.6 %      MCV 82.6 fL      MCH 28.5 pg      MCHC 34.5 g/dL      RDW 12.9 %      RDW-SD 38.5 fl      MPV 8.8 fL      Platelets 542 10*3/mm3      Neutrophil % 67.5 %      Lymphocyte % 15.4 %      Monocyte % 7.8 %      Eosinophil % 1.1 %      Basophil % 0.1 %      Immature Grans % 8.1 %      Neutrophils, Absolute 10.18 10*3/mm3      Lymphocytes, Absolute 2.33 10*3/mm3      Monocytes, Absolute 1.18 10*3/mm3      Eosinophils, Absolute 0.17 10*3/mm3      Basophils, Absolute 0.01 10*3/mm3      Immature Grans, Absolute 1.23 10*3/mm3      nRBC 0.0 /100 WBC     Scan Slide [[id number]] Collected: 12/08/20 1351    Specimen: Blood Updated: 12/08/20 1400    Light Blue Top [[id number]] Collected: 12/08/20 1351    Specimen: Blood Updated: 12/08/20 1357    Comprehensive Metabolic Panel [[id number]] Collected: 12/08/20 1351    Specimen: Blood Updated: 12/08/20 1357    Green Top (Gel)  [[id number]] Collected: 12/08/20 1351    Specimen: Blood Updated: 12/08/20 1357    Lavender Top [[id number]] Collected: 12/08/20 1351    Specimen: Blood Updated: 12/08/20 1357    [place] Draw [[id number]] Collected: 12/08/20 1351    Specimen: Blood Updated: 12/08/20 1357    Narrative:      The following orders were created for panel order [place] Draw.  Procedure                               Abnormality         Status                     ---------                               -----------         ------                     Light Blue Top[[id number]]                                   In process                 Green Top (Gel)[[id number]]                                  In process                 Lavender Top[[id number]]                                     In process                 Gold Top - SST[[id number]]                                   In process                   Please view results for these tests on the individual orders.    Gold Top - SST [[id number]] Collected: 12/08/20 1351    Specimen: Blood Updated: 12/08/20 1357        Imaging Results (Last 24 Hours)     Procedure Component Value Units Date/Time    XR Chest 1 View [[id number]] Collected: 12/08/20 1401     Updated: 12/08/20 1426    Narrative:      Chest x-ray single view.       CLINICAL INDICATION: Shortness of breath. Triage protocol. Covid  +    COMPARISON: March 29, 2017.    FINDINGS: Cardiac silhouette is normal in size. Pulmonary  vascularity is unremarkable.     Bilateral somewhat peripheral appearing infiltrative changes  strongly suggesting Covid pneumonitis.      Impression:      Bilateral somewhat peripheral appearing infiltrative  changes, pneumonitis. The distribution  suggests COVID   pneumonitis.    Electronically signed by:  Carlos Alberto Aguiar MD  12/8/2020 2:25 PM [facility]  Workstation: [id number]            Assessment:    Active Hospital Problems    Diagnosis   • Acute respiratory failure with hypoxia (CMS/HCC)   • Pneumonia due to COVID-19 virus   •  Essential hypertension   • Type 2 diabetes mellitus with hyperglycemia, with long-term current use of insulin (CMS/[facility])             Plan:  -We will admit patient for observation continue supplemental oxygen, wean as tolerated  -We will start the patient on Decadron 6 mg daily  -We will also start patient on remdesivir, risk vs benefits discuss.  -However and start him on some prophylactic antibiotics pending his procalcitonin that we will also check as well as his other Covid monitoring labs. Rocephin and Zmax orders.   -We will start bronchodilators with scheduled albuterol  -Blood cultures were collected in the emergency department  -We will order Accu-Cheks and place on sliding scale insulin for his glycemic control  -Home medications will be reordered as appropriate  -DVT prophylaxis with Lovenox twice daily per current standards and guidelines  -CODE STATUS: Full    The patient was evaluated during the global COVID-19 pandemic, and the diagnosis was suspected/considered upon their initial presentation.  Evaluation, treatment, and testing were consistent with current guidelines for patients who present with complaints or symptoms that may be related to COVID-19.    I discussed the patient's findings and my recommendations with: the patient    Sumeet Zimmerman MD

## 2020-12-08 NOTE — ED PROVIDER NOTES
Subjective   Patient states that he tested positive for COVID-19 on November 28.  His wife was positive before that.  He presents emergency department with respiratory distress.  He denies having ever smoked and does not use home oxygen.        Shortness of Breath  Severity:  Moderate  Onset quality:  Gradual  Duration:  10 days  Timing:  Constant  Progression:  Worsening  Chronicity:  New  Relieved by:  Oxygen  Worsened by:  Exertion and movement  Associated symptoms: cough and diaphoresis    Associated symptoms: no abdominal pain, no chest pain, no ear pain, no fever, no headaches, no neck pain, no rash, no sore throat, no vomiting and no wheezing    Risk factors: no tobacco use        Review of Systems   Constitutional: Positive for activity change, diaphoresis and fatigue. Negative for appetite change, chills and fever.   HENT: Negative for congestion, ear discharge, ear pain, nosebleeds, rhinorrhea, sinus pressure, sore throat and trouble swallowing.    Eyes: Negative for discharge and redness.   Respiratory: Positive for cough and shortness of breath. Negative for apnea, chest tightness and wheezing.    Cardiovascular: Negative for chest pain.   Gastrointestinal: Negative for abdominal pain, diarrhea, nausea and vomiting.   Endocrine: Negative for polyuria.   Genitourinary: Negative for dysuria, frequency and urgency.   Musculoskeletal: Negative for myalgias and neck pain.   Skin: Negative for color change and rash.   Allergic/Immunologic: Negative for immunocompromised state.   Neurological: Positive for dizziness and weakness. Negative for seizures, syncope, light-headedness and headaches.   Hematological: Negative for adenopathy. Does not bruise/bleed easily.   Psychiatric/Behavioral: Negative for behavioral problems and confusion.   All other systems reviewed and are negative.      Past Medical History:   Diagnosis Date   • Chronic back pain    • Diabetes mellitus (CMS/[facility])    • Diabetic neuropathy  (CMS/[facility])    • History of migraine headaches    • Hypercholesterolemia    • Hypercholesterolemia    • Hypertension    • Obesity    • Pneumonia due to COVID-19 virus 12/8/2020       Allergies   Allergen Reactions   • Ambien [Zolpidem Tartrate] Confusion       Past Surgical History:   Procedure Laterality Date   • ANTERIOR LUMBAR EXPOSURE N/A 11/27/2017    Procedure: ANTERIOR LUMBAR EXPOSURE;  Surgeon: Devon Guan DO;  Location:  [facility] OR;  Service:    • BACK SURGERY      X 2   • LUMBAR FUSION N/A 11/27/2017    Procedure: ANTERIOR DECOMPRESSION, ANTERIOR LUMBAR INTERBODY FUSION WITH INSTRUMENTATION L5-S1;  Surgeon: SHERI Lunsford MD;  Location:  [facility] OR;  Service:    • LUMBAR LAMINECTOMY WITH FUSION N/A 11/29/2017    Procedure: POSTERIOR SPINAL FUSION WITH INSTRUMENTATION L5-S1;  Surgeon: SHERI Lunsford MD;  Location:  [facility] OR;  Service:        Family History   Problem Relation Age of Onset   • Diabetes Other    • Heart disease Other    • Diabetes Father    • Heart disease Father        Social History     Socioeconomic History   • Marital status:      Spouse name: Not on file   • Number of children: Not on file   • Years of education: Not on file   • Highest education level: Not on file   Tobacco Use   • Smoking status: Never Smoker   • Smokeless tobacco: Former User     Types: Chew   Substance and Sexual Activity   • Alcohol use: No   • Drug use: No   • Sexual activity: Defer           Objective   Physical Exam  Vitals signs and nursing note reviewed.   Constitutional:       Appearance: He is well-developed.   HENT:      Head: Normocephalic and atraumatic.      Nose: Nose normal.   Eyes:      General: No scleral icterus.        Right eye: No discharge.         Left eye: No discharge.      Conjunctiva/sclera: Conjunctivae normal.      Pupils: Pupils are equal, round, and reactive to light.   Neck:      Musculoskeletal: Normal range of motion and neck supple.      Trachea: No tracheal deviation.    Cardiovascular:      Rate and Rhythm: Normal rate and regular rhythm.      Heart sounds: Normal heart sounds. No murmur.   Pulmonary:      Effort: Pulmonary effort is normal. Tachypnea present. No respiratory distress.      Breath sounds: No stridor. Decreased breath sounds, wheezing and rhonchi present. No rales.   Abdominal:      General: Bowel sounds are normal. There is no distension.      Palpations: Abdomen is soft. There is no mass.      Tenderness: There is no abdominal tenderness. There is no guarding or rebound.   Skin:     General: Skin is warm and dry.      Findings: No erythema or rash.   Neurological:      Mental Status: He is alert and oriented to person, place, and time.      Coordination: Coordination normal.   Psychiatric:         Behavior: Behavior normal.         Thought Content: Thought content normal.         ECG 12 Lead      Date/Time: 12/8/2020 2:42 PM  Performed by: Trevor Nelson MD  Authorized by: Trevor Nelson MD   Interpreted by physician  Rhythm: sinus rhythm  Rate: normal  BPM: 92  ST Segments: ST segments normal                   ED Course             Labs Reviewed   COMPREHENSIVE METABOLIC PANEL - Abnormal; Notable for the following components:       Result Value    Glucose 229 (*)     Creatinine 0.64 (*)     Sodium 132 (*)     Chloride 96 (*)     CO2 21.0 (*)     Albumin 3.30 (*)     BUN/Creatinine Ratio 29.7 (*)     All other components within normal limits    Narrative:     GFR Normal >60  Chronic Kidney Disease <60  Kidney Failure <15     CBC WITH AUTO DIFFERENTIAL - Abnormal; Notable for the following components:    WBC 15.10 (*)     Platelets 542 (*)     Lymphocyte % 15.4 (*)     Immature Grans % 8.1 (*)     Neutrophils, Absolute 10.18 (*)     Monocytes, Absolute 1.18 (*)     Immature Grans, Absolute 1.23 (*)     All other components within normal limits   MANUAL DIFFERENTIAL - Abnormal; Notable for the following components:    Lymphocyte % 12.0 (*)      Metamyelocyte % 6.0 (*)     Myelocyte % 4.0 (*)     Promyelocyte % 1.0 (*)     Neutrophils Absolute 9.51 (*)     Monocytes Absolute 1.36 (*)     Eosinophils Absolute 0.76 (*)     All other components within normal limits   FERRITIN - Abnormal; Notable for the following components:    Ferritin 1,490.00 (*)     All other components within normal limits    Narrative:     Results may be falsely decreased if patient taking Biotin.     LACTATE DEHYDROGENASE - Abnormal; Notable for the following components:     (*)     All other components within normal limits   D-DIMER, QUANTITATIVE - Abnormal; Notable for the following components:    D-Dimer, Quantitative 1,193 (*)     All other components within normal limits    Narrative:     Dimer values <500 ng/ml FEU are FDA approved as aid in diagnosis of deep venous thrombosis and pulmonary embolism.  This test should not be used in an exclusion strategy with pretest probability alone.    A recent guideline regarding diagnosis for pulmonary thromboembolism recommends an adjusted exclusion criterion of age x 10 ng/ml FEU for patients >50 years of age ([employer] Intern Med 2015; 163: 701-711).     HEPATIC FUNCTION PANEL - Abnormal; Notable for the following components:    Albumin 3.30 (*)     All other components within normal limits   CREATININE, SERUM - Abnormal; Notable for the following components:    Creatinine 0.70 (*)     All other components within normal limits    Narrative:     GFR Normal >60  Chronic Kidney Disease <60  Kidney Failure <15     POCT GLUCOSE FINGERSTICK - Abnormal; Notable for the following components:    Glucose 225 (*)     All other components within normal limits   BNP (IN-HOUSE) - Normal    Narrative:     Among patients with dyspnea, NT-proBNP is highly sensitive for the detection of acute congestive heart failure. In addition NT-proBNP of <300 pg/ml effectively rules out acute congestive heart failure with 99% negative predictive value.    Results may  "be falsely decreased if patient taking Biotin.     TROPONIN (IN-HOUSE) - Normal    Narrative:     Troponin T Reference Range:  <= 0.03 ng/mL-   Negative for AMI  >0.03 ng/mL-     Abnormal for myocardial necrosis.  Clinicians would have to utilize clinical acumen, EKG, Troponin and serial changes to determine if it is an Acute Myocardial Infarction or myocardial injury due to an underlying chronic condition.       Results may be falsely decreased if patient taking Biotin.     LACTIC ACID, PLASMA - Normal   PROCALCITONIN - Normal    Narrative:     As a Marker for Sepsis (Non-Neonates):   1. <0.5 ng/mL represents a low risk of severe sepsis and/or septic shock.  1. >2 ng/mL represents a high risk of severe sepsis and/or septic shock.    As a Marker for Lower Respiratory Tract Infections that require antibiotic therapy:  PCT on Admission     Antibiotic Therapy             6-12 Hrs later  > 0.5                Strongly Recommended            >0.25 - <0.5         Recommended  0.1 - 0.25           Discouraged                   Remeasure/reassess PCT  <0.1                 Strongly Discouraged          Remeasure/reassess PCT      As 28 day mortality risk marker: \"Change in Procalcitonin Result\" (> 80 % or <=80 %) if Day 0 (or Day 1) and Day 4 values are available. Refer to http://www.[employer]-pct-calculator.com/   Change in PCT <=80 %   A decrease of PCT levels below or equal to 80 % defines a positive change in PCT test result representing a higher risk for 28-day all-cause mortality of patients diagnosed with severe sepsis or septic shock.  Change in PCT > 80 %   A decrease of PCT levels of more than 80 % defines a negative change in PCT result representing a lower risk for 28-day all-cause mortality of patients diagnosed with severe sepsis or septic shock.                Results may be falsely decreased if patient taking Biotin.    PROCALCITONIN - Normal    Narrative:     As a Marker for Sepsis (Non-Neonates):   1. <0.5 " "ng/mL represents a low risk of severe sepsis and/or septic shock.  1. >2 ng/mL represents a high risk of severe sepsis and/or septic shock.    As a Marker for Lower Respiratory Tract Infections that require antibiotic therapy:  PCT on Admission     Antibiotic Therapy             6-12 Hrs later  > 0.5                Strongly Recommended            >0.25 - <0.5         Recommended  0.1 - 0.25           Discouraged                   Remeasure/reassess PCT  <0.1                 Strongly Discouraged          Remeasure/reassess PCT      As 28 day mortality risk marker: \"Change in Procalcitonin Result\" (> 80 % or <=80 %) if Day 0 (or Day 1) and Day 4 values are available. Refer to http://www.[facility]pct-calculator.com/   Change in PCT <=80 %   A decrease of PCT levels below or equal to 80 % defines a positive change in PCT test result representing a higher risk for 28-day all-cause mortality of patients diagnosed with severe sepsis or septic shock.  Change in PCT > 80 %   A decrease of PCT levels of more than 80 % defines a negative change in PCT result representing a lower risk for 28-day all-cause mortality of patients diagnosed with severe sepsis or septic shock.                Results may be falsely decreased if patient taking Biotin.    BLOOD CULTURE   BLOOD CULTURE   COVID-19,[facility] IN-HOUSE, NP SWAB IN TRANSPORT MEDIA 8-10 HR TAT   RAINBOW DRAW    Narrative:     The following orders were created for panel order [place] Draw.  Procedure                               Abnormality         Status                     ---------                               -----------         ------                     Light Blue Top[[id number]]                                   Final result               Green Top (Gel)[[id number]]                                  Final result               Lavender Top[[id number]]                                     Final result               Gold Top - SST[[id number]]                                   Final " result                 Please view results for these tests on the individual orders.   POCT GLUCOSE FINGERSTICK   POCT GLUCOSE FINGERSTICK   POCT GLUCOSE FINGERSTICK   POCT GLUCOSE FINGERSTICK   POCT GLUCOSE FINGERSTICK   CBC AND DIFFERENTIAL    Narrative:     The following orders were created for panel order CBC & Differential.  Procedure                               Abnormality         Status                     ---------                               -----------         ------                     Scan Slide[[id number]]                                                                  CBC Auto Differential[[id number]]        Abnormal            Final result                 Please view results for these tests on the individual orders.   LIGHT BLUE TOP   GREEN TOP   LAVENDER TOP   GOLD TOP - SST       XR Chest 1 View   Final Result   Bilateral somewhat peripheral appearing infiltrative   changes, pneumonitis. The distribution  suggests COVID    pneumonitis.      Electronically signed by:  Carlos Alberto Aguiar MD  12/8/2020 2:25 PM [facility]   Workstation: [id number]                                          [facility]    Final diagnoses:   Pneumonia due to COVID-19 virus   Acute respiratory failure with hypoxia (CMS/HCC)            Trevor Nelson MD  12/08/20 1443       Trevor Nelson MD  12/08/20 1946

## 2020-12-09 LAB
ALBUMIN SERPL-MCNC: 3.2 G/DL (ref 3.5–5.2)
ALBUMIN/GLOB SERPL: 0.8 G/DL
ALP SERPL-CCNC: 66 U/L (ref 39–117)
ALT SERPL W P-5'-P-CCNC: 22 U/L (ref 1–41)
ANION GAP SERPL CALCULATED.3IONS-SCNC: 12 MMOL/L (ref 5–15)
AST SERPL-CCNC: 18 U/L (ref 1–40)
BASOPHILS # BLD AUTO: 0.07 10*3/MM3 (ref 0–0.2)
BASOPHILS NFR BLD AUTO: 0.7 % (ref 0–1.5)
BILIRUB CONJ SERPL-MCNC: 0.3 MG/DL (ref 0–0.3)
BILIRUB SERPL-MCNC: 0.7 MG/DL (ref 0–1.2)
BUN SERPL-MCNC: 16 MG/DL (ref 6–20)
BUN/CREAT SERPL: 29.6 (ref 7–25)
CALCIUM SPEC-SCNC: 9.4 MG/DL (ref 8.6–10.5)
CHLORIDE SERPL-SCNC: 97 MMOL/L (ref 98–107)
CK SERPL-CCNC: 8 U/L (ref 20–200)
CO2 SERPL-SCNC: 21 MMOL/L (ref 22–29)
CREAT SERPL-MCNC: 0.54 MG/DL (ref 0.76–1.27)
CRP SERPL-MCNC: 6.41 MG/DL (ref 0–0.5)
D-DIMER, QUANTITATIVE (MAD,POW, STR): 945 NG/ML (FEU) (ref 0–470)
DEPRECATED RDW RBC AUTO: 38.1 FL (ref 37–54)
EOSINOPHIL # BLD AUTO: 0.02 10*3/MM3 (ref 0–0.4)
EOSINOPHIL NFR BLD AUTO: 0.2 % (ref 0.3–6.2)
ERYTHROCYTE [DISTWIDTH] IN BLOOD BY AUTOMATED COUNT: 13 % (ref 12.3–15.4)
FERRITIN SERPL-MCNC: 1399 NG/ML (ref 30–400)
FIBRINOGEN PPP-MCNC: 734 MG/DL (ref 228–514)
GFR SERPL CREATININE-BSD FRML MDRD: >150 ML/MIN/1.73
GLOBULIN UR ELPH-MCNC: 4 GM/DL
GLUCOSE BLDC GLUCOMTR-MCNC: 277 MG/DL (ref 70–130)
GLUCOSE BLDC GLUCOMTR-MCNC: 290 MG/DL (ref 70–130)
GLUCOSE BLDC GLUCOMTR-MCNC: 325 MG/DL (ref 70–130)
GLUCOSE BLDC GLUCOMTR-MCNC: 347 MG/DL (ref 70–130)
GLUCOSE BLDC GLUCOMTR-MCNC: 362 MG/DL (ref 70–130)
GLUCOSE SERPL-MCNC: 301 MG/DL (ref 65–99)
HCT VFR BLD AUTO: 39.9 % (ref 37.5–51)
HGB BLD-MCNC: 14.1 G/DL (ref 13–17.7)
IMM GRANULOCYTES # BLD AUTO: 0.87 10*3/MM3 (ref 0–0.05)
IMM GRANULOCYTES NFR BLD AUTO: 8.1 % (ref 0–0.5)
LDH SERPL-CCNC: 233 U/L (ref 135–225)
LYMPHOCYTES # BLD AUTO: 1.18 10*3/MM3 (ref 0.7–3.1)
LYMPHOCYTES NFR BLD AUTO: 11 % (ref 19.6–45.3)
MCH RBC QN AUTO: 29 PG (ref 26.6–33)
MCHC RBC AUTO-ENTMCNC: 35.3 G/DL (ref 31.5–35.7)
MCV RBC AUTO: 81.9 FL (ref 79–97)
MONOCYTES # BLD AUTO: 0.51 10*3/MM3 (ref 0.1–0.9)
MONOCYTES NFR BLD AUTO: 4.8 % (ref 5–12)
NEUTROPHILS NFR BLD AUTO: 75.2 % (ref 42.7–76)
NEUTROPHILS NFR BLD AUTO: 8.05 10*3/MM3 (ref 1.7–7)
NRBC BLD AUTO-RTO: 0 /100 WBC (ref 0–0.2)
PLATELET # BLD AUTO: 564 10*3/MM3 (ref 140–450)
PMV BLD AUTO: 9 FL (ref 6–12)
POTASSIUM SERPL-SCNC: 4.7 MMOL/L (ref 3.5–5.2)
PROT SERPL-MCNC: 7.2 G/DL (ref 6–8.5)
QT INTERVAL: 366 MS
QTC INTERVAL: 452 MS
RBC # BLD AUTO: 4.87 10*6/MM3 (ref 4.14–5.8)
SODIUM SERPL-SCNC: 130 MMOL/L (ref 136–145)
WBC # BLD AUTO: 10.7 10*3/MM3 (ref 3.4–10.8)

## 2020-12-09 PROCEDURE — 82550 ASSAY OF CK (CPK): CPT | Performed by: FAMILY MEDICINE

## 2020-12-09 PROCEDURE — 85025 COMPLETE CBC W/AUTO DIFF WBC: CPT | Performed by: FAMILY MEDICINE

## 2020-12-09 PROCEDURE — 85379 FIBRIN DEGRADATION QUANT: CPT | Performed by: FAMILY MEDICINE

## 2020-12-09 PROCEDURE — 83615 LACTATE (LD) (LDH) ENZYME: CPT | Performed by: FAMILY MEDICINE

## 2020-12-09 PROCEDURE — 94799 UNLISTED PULMONARY SVC/PX: CPT

## 2020-12-09 PROCEDURE — 63710000001 INSULIN ASPART PER 5 UNITS: Performed by: FAMILY MEDICINE

## 2020-12-09 PROCEDURE — 82248 BILIRUBIN DIRECT: CPT | Performed by: FAMILY MEDICINE

## 2020-12-09 PROCEDURE — 80053 COMPREHEN METABOLIC PANEL: CPT | Performed by: FAMILY MEDICINE

## 2020-12-09 PROCEDURE — 63710000001 INSULIN DETEMIR PER 5 UNITS: Performed by: FAMILY MEDICINE

## 2020-12-09 PROCEDURE — 86140 C-REACTIVE PROTEIN: CPT | Performed by: FAMILY MEDICINE

## 2020-12-09 PROCEDURE — 82962 GLUCOSE BLOOD TEST: CPT

## 2020-12-09 PROCEDURE — 25010000002 ENOXAPARIN PER 10 MG: Performed by: FAMILY MEDICINE

## 2020-12-09 PROCEDURE — 82728 ASSAY OF FERRITIN: CPT | Performed by: FAMILY MEDICINE

## 2020-12-09 PROCEDURE — 63710000001 DEXAMETHASONE PER 0.25 MG: Performed by: FAMILY MEDICINE

## 2020-12-09 PROCEDURE — 85384 FIBRINOGEN ACTIVITY: CPT | Performed by: FAMILY MEDICINE

## 2020-12-09 RX ORDER — OXYCODONE AND ACETAMINOPHEN 10; 325 MG/1; MG/1
1 TABLET ORAL EVERY 4 HOURS PRN
Status: DISCONTINUED | OUTPATIENT
Start: 2020-12-09 | End: 2020-12-13 | Stop reason: HOSPADM

## 2020-12-09 RX ADMIN — OXYCODONE HYDROCHLORIDE AND ACETAMINOPHEN 1 TABLET: 10; 325 TABLET ORAL at 00:02

## 2020-12-09 RX ADMIN — ALBUTEROL SULFATE 2 PUFF: 90 AEROSOL, METERED RESPIRATORY (INHALATION) at 19:34

## 2020-12-09 RX ADMIN — GABAPENTIN 800 MG: 400 CAPSULE ORAL at 11:16

## 2020-12-09 RX ADMIN — ENOXAPARIN SODIUM 40 MG: 40 INJECTION SUBCUTANEOUS at 17:21

## 2020-12-09 RX ADMIN — INSULIN DETEMIR 20 UNITS: 100 INJECTION, SOLUTION SUBCUTANEOUS at 20:01

## 2020-12-09 RX ADMIN — ATORVASTATIN CALCIUM 40 MG: 40 TABLET, FILM COATED ORAL at 20:00

## 2020-12-09 RX ADMIN — INSULIN ASPART 6 UNITS: 100 INJECTION, SOLUTION INTRAVENOUS; SUBCUTANEOUS at 17:20

## 2020-12-09 RX ADMIN — SODIUM CHLORIDE, PRESERVATIVE FREE 10 ML: 5 INJECTION INTRAVENOUS at 08:55

## 2020-12-09 RX ADMIN — OXYCODONE HYDROCHLORIDE AND ACETAMINOPHEN 1 TABLET: 10; 325 TABLET ORAL at 05:39

## 2020-12-09 RX ADMIN — DOCUSATE SODIUM 100 MG: 100 CAPSULE, LIQUID FILLED ORAL at 20:00

## 2020-12-09 RX ADMIN — GABAPENTIN 800 MG: 400 CAPSULE ORAL at 20:00

## 2020-12-09 RX ADMIN — GABAPENTIN 800 MG: 400 CAPSULE ORAL at 05:39

## 2020-12-09 RX ADMIN — ENOXAPARIN SODIUM 40 MG: 40 INJECTION SUBCUTANEOUS at 05:39

## 2020-12-09 RX ADMIN — SERTRALINE HYDROCHLORIDE 100 MG: 50 TABLET ORAL at 08:55

## 2020-12-09 RX ADMIN — SODIUM CHLORIDE, PRESERVATIVE FREE 10 ML: 5 INJECTION INTRAVENOUS at 20:00

## 2020-12-09 RX ADMIN — ALBUTEROL SULFATE 2 PUFF: 90 AEROSOL, METERED RESPIRATORY (INHALATION) at 15:01

## 2020-12-09 RX ADMIN — ALBUTEROL SULFATE 2 PUFF: 90 AEROSOL, METERED RESPIRATORY (INHALATION) at 07:25

## 2020-12-09 RX ADMIN — OXYCODONE HYDROCHLORIDE AND ACETAMINOPHEN 1 TABLET: 10; 325 TABLET ORAL at 11:15

## 2020-12-09 RX ADMIN — REMDESIVIR 100 MG: 100 INJECTION, POWDER, LYOPHILIZED, FOR SOLUTION INTRAVENOUS at 18:08

## 2020-12-09 RX ADMIN — LOSARTAN POTASSIUM 50 MG: 50 TABLET, FILM COATED ORAL at 20:00

## 2020-12-09 RX ADMIN — DEXAMETHASONE 6 MG: 2 TABLET ORAL at 08:55

## 2020-12-09 RX ADMIN — OXYCODONE HYDROCHLORIDE AND ACETAMINOPHEN 1 TABLET: 10; 325 TABLET ORAL at 17:21

## 2020-12-09 RX ADMIN — INSULIN ASPART 3 UNITS: 100 INJECTION, SOLUTION INTRAVENOUS; SUBCUTANEOUS at 11:46

## 2020-12-09 RX ADMIN — INSULIN ASPART 5 UNITS: 100 INJECTION, SOLUTION INTRAVENOUS; SUBCUTANEOUS at 08:56

## 2020-12-09 NOTE — PLAN OF CARE
Goal Outcome Evaluation:  Plan of Care Reviewed With: patient  Progress: improving  Outcome Summary: VSS, he is doing well with no new complaints, will cont to monitor

## 2020-12-09 NOTE — PROGRESS NOTES
Discharge Planning Assessment  [facility]     Patient Name: Trevor Hernandez  MRN: [id number]  Today's Date: 12/9/2020    Admit Date: 12/8/2020    Discharge Needs Assessment     Row Name 12/09/20 1017       Living Environment    Lives With  spouse;child(jason), dependent    Name(s) of Who Lives With Patient  wife lisseth and 11 yr old son    Current Living Arrangements  home/apartment/condo    Primary Care Provided by  self    Provides Primary Care For  child(jason)    Caregiving Concerns  co parents    Family Caregiver if Needed  spouse    Family Caregiver Names  lisseth has returned to work,  she also had covid    Quality of Family Relationships  helpful    Able to Return to Prior Arrangements  yes       Resource/Environmental Concerns    Resource/Environmental Concerns  none    Transportation Concerns  car, none       Transition Planning    Patient/Family Anticipates Transition to  home with family    Patient/Family Anticipated Services at Transition  none    Transportation Anticipated  family or friend will provide       Discharge Needs Assessment    Equipment Currently Used at Home  glucometer    Concerns to be Addressed  denies needs/concerns at this time    Discharge Coordination/Progress  pt extremely short of breath during lace,  lives with spouse and their 11 yr old son.  states good support,  spouse also had covid but she is back to work now,   states independent prior to getting sick,  states has med coverage and uses [facility].  only has glucometer at home and takes insulin once per week.   has never needed [facility].  wife to transport upon dc.        Discharge Plan     Row Name 12/09/20 1005       Plan    Plan Comments  pt presents to er after being sent from provider after tele visit.  desats to 88%   currently 94% on 4liters.   on decadron remdesivir,  teresa dodson rn        Continued Care and Services - Admitted Since 12/8/2020    Coordination has not been started for this encounter.          Demographic Summary     Row Name 12/09/20 1013       General Information    Admission Type  inpatient    Arrived From  home    Referral Source  hospital clinician/department    Reason for Consult  discharge planning    Preferred Language  English    General Information Comments  confirmed face sheet and insurance over phone.  pt states that he has [employer] but does not have his card with him  his wife will need to provide        Functional Status    No documentation.       Psychosocial    No documentation.       Abuse/Neglect    No documentation.       Legal    No documentation.       Substance Abuse    No documentation.       Patient Forms    No documentation.           Angelica Wilson RN

## 2020-12-09 NOTE — PROGRESS NOTES
[facility] Medicine Services  INPATIENT PROGRESS NOTE    Length of Stay: 1  Date of Admission: 12/8/2020  Primary Care Physician: Efrem Deng MD    Subjective   Chief Complaint:     HPI:      12/9/20: The patient has been increased to 4 L of oxygen today.  Progression labs have improved today.  Patient states he has intermittent tightness in his chest.    H&P by Dr. Zimmerman:  Patient is a 45-year-old male with past medical history notable for diabetes mellitus, hyperlipidemia, hypertension, and chronic pain.  Patient presented to the emergency department due to worsening dyspnea.  Per patient report he had tested positive for COVID-19 at outside facility prior to arrival today.  He states that he first tested positive for Thanksgiving and his wife was positive prior to that.  He notes he has been having worsening shortness of breath with exertion the last 2 to 3 days now.  He notes that he had cough initially but this seems improved today.  He also had some initial nausea and vomiting but this is also better.  He also noted some difficulties with headache but that also seems somewhat improved.  He denies any body aches.  He also notes that he has had issues with his sense of taste and smell.     Patient was noted to be hypoxic on room air to 88% so he was placed on supplemental oxygen in the ED.  Chest x-ray was read as concerning for bilateral infiltrative changes consistent with Covid pneumonitis.  CBC showed a WBC of 15.1 with normal hemoglobin and hematocrit.  He did have a thrombosed cytosis with platelets of 2/5/1941.  Lactate, troponin T, and proBNP were all normal.  CMP shows a glucose of 229, creatinine 1.64, sodium 132, chloride 96, CO2 21, albumin 3.3.  Blood cultures were collected in the emergency department.    Review of Systems   Constitutional: Positive for fatigue. Negative for activity change.   HENT: Negative for ear pain and sore throat.       Eyes: Negative for pain and discharge.   Respiratory: Positive for cough and shortness of breath.    Cardiovascular: Negative for chest pain and palpitations.   Gastrointestinal: Negative for abdominal pain and nausea.   Endocrine: Negative for cold intolerance and heat intolerance.   Genitourinary: Negative for difficulty urinating and dysuria.   Musculoskeletal: Negative for arthralgias and gait problem.   Skin: Negative for color change and rash.   Neurological: Negative for dizziness and weakness.   Psychiatric/Behavioral: Negative for agitation and confusion.        Objective    Temp:  [96 °F (35.6 °C)-97.5 °F (36.4 °C)] 96 °F (35.6 °C)  Heart Rate:  [60-96] 71  Resp:  [18-20] 18  BP: (116-141)/(64-87) 120/76    Physical Exam  Constitutional:       Appearance: He is well-developed.   HENT:      Head: Normocephalic and atraumatic.   Eyes:      Pupils: Pupils are equal, round, and reactive to light.   Neck:      Musculoskeletal: Normal range of motion and neck supple.   Cardiovascular:      Rate and Rhythm: Normal rate and regular rhythm.   Pulmonary:      Effort: Pulmonary effort is normal.      Breath sounds: Normal breath sounds.   Abdominal:      General: Bowel sounds are normal.      Palpations: Abdomen is soft.   Musculoskeletal: Normal range of motion.   Skin:     General: Skin is warm and dry.   Neurological:      Mental Status: He is alert and oriented to person, place, and time.   Psychiatric:         Behavior: Behavior normal.       Results Review:  I have reviewed the labs, radiology results, and diagnostic studies.    Laboratory Data:   Results from last 7 days   Lab Units 12/09/20  0656 12/08/20  1351   SODIUM mmol/L 130* 132*   POTASSIUM mmol/L 4.7 4.2   CHLORIDE mmol/L 97* 96*   CO2 mmol/L 21.0* 21.0*   BUN mg/dL 16 19   CREATININE mg/dL 0.54* 0.70*  0.64*   GLUCOSE mg/dL 301* 229*   CALCIUM mg/dL 9.4 9.0   BILIRUBIN mg/dL 0.7 0.7  0.7   ALK PHOS U/L 66 73  73   ALT (SGPT) U/L 22 30  29    AST (SGOT) U/L 18 24  25   ANION GAP mmol/L 12.0 15.0     Estimated Creatinine Clearance: 216.8 mL/min (A) (by C-G formula based on SCr of 0.54 mg/dL (L)).          Results from last 7 days   Lab Units 12/09/20  0656 12/08/20  1351   WBC 10*3/mm3 10.70 15.10*   HEMOGLOBIN g/dL 14.1 15.4   HEMATOCRIT % 39.9 44.6   PLATELETS 10*3/mm3 564* 542*           Culture Data:   No results found for: BLOODCX  No results found for: URINECX  No results found for: RESPCX  No results found for: WOUNDCX  No results found for: STOOLCX  No components found for: BODYFLD    Radiology Data:   Imaging Results (Last 24 Hours)     ** No results found for the last 24 hours. **          I have reviewed the patient's current medications.     Assessment/Plan     Active Hospital Problems    Diagnosis POA   • Acute respiratory failure with hypoxia (CMS/[facility]) [J96.01] Unknown   • Pneumonia due to COVID-19 virus [U07.1, J12.89] Unknown   • Essential hypertension [I10] Yes   • Type 2 diabetes mellitus with hyperglycemia, with long-term current use of insulin (CMS/[facility]) [E11.65, Z79.4] Not Applicable       Plan:    1 .  Acute respiratory failure with hypoxia: Patient is currently on 4 L of oxygen.  We will continue to monitor closely and wean if possible.    2.  Pneumonia secondary to COVID-19 virus: Continue Decadron 6 mg daily, remdesivir, prophylactic antibiotics and progression labs.  Continue bronchodilators and scheduled albuterol.  Blood cultures negative at 24 hours.  3.  Hypertension: Continue home losartan.  4.  Hyperlipidemia: Continue home statin.  5.  Diabetes mellitus, type II: Continue SSI.        DVT prophylaxis with Lovenox twice daily per current standards and guidelines  CODE STATUS: Full    Discharge Planning: I expect patient to be discharged to home in 3-4 days.      This document has been electronically signed by DAMIAN Lobato on December 9, 2020 14:34 CST

## 2020-12-09 NOTE — PLAN OF CARE
Problem: Adult Inpatient Plan of Care  Goal: Plan of Care Review  Recent Flowsheet Documentation  Taken 12/9/2020 1527 by Maria Luz Contreras RN  Progress: no change  Plan of Care Reviewed With: patient  Outcome Summary: vss. patient on 4L nasal cannula. pain controlled with po meds. resting between care. will continue to monitor.   Goal Outcome Evaluation:  Plan of Care Reviewed With: patient  Progress: no change  Outcome Summary: vss. patient on 4L nasal cannula. pain controlled with po meds. resting between care. will continue to monitor.

## 2020-12-10 LAB
ALBUMIN SERPL-MCNC: 3.2 G/DL (ref 3.5–5.2)
ALBUMIN/GLOB SERPL: 0.8 G/DL
ALP SERPL-CCNC: 69 U/L (ref 39–117)
ALT SERPL W P-5'-P-CCNC: 21 U/L (ref 1–41)
ANION GAP SERPL CALCULATED.3IONS-SCNC: 10 MMOL/L (ref 5–15)
AST SERPL-CCNC: 17 U/L (ref 1–40)
BASOPHILS # BLD AUTO: 0.03 10*3/MM3 (ref 0–0.2)
BASOPHILS NFR BLD AUTO: 0.2 % (ref 0–1.5)
BILIRUB CONJ SERPL-MCNC: 0.2 MG/DL (ref 0–0.3)
BILIRUB SERPL-MCNC: 0.7 MG/DL (ref 0–1.2)
BUN SERPL-MCNC: 18 MG/DL (ref 6–20)
BUN/CREAT SERPL: 36.7 (ref 7–25)
CALCIUM SPEC-SCNC: 8.8 MG/DL (ref 8.6–10.5)
CHLORIDE SERPL-SCNC: 96 MMOL/L (ref 98–107)
CK SERPL-CCNC: 17 U/L (ref 20–200)
CO2 SERPL-SCNC: 23 MMOL/L (ref 22–29)
CREAT SERPL-MCNC: 0.49 MG/DL (ref 0.76–1.27)
CRP SERPL-MCNC: 2.21 MG/DL (ref 0–0.5)
DEPRECATED RDW RBC AUTO: 36.9 FL (ref 37–54)
EOSINOPHIL # BLD AUTO: 0.05 10*3/MM3 (ref 0–0.4)
EOSINOPHIL NFR BLD AUTO: 0.4 % (ref 0.3–6.2)
ERYTHROCYTE [DISTWIDTH] IN BLOOD BY AUTOMATED COUNT: 12.7 % (ref 12.3–15.4)
FERRITIN SERPL-MCNC: 1333 NG/ML (ref 30–400)
GFR SERPL CREATININE-BSD FRML MDRD: >150 ML/MIN/1.73
GLOBULIN UR ELPH-MCNC: 3.9 GM/DL
GLUCOSE BLDC GLUCOMTR-MCNC: 228 MG/DL (ref 70–130)
GLUCOSE BLDC GLUCOMTR-MCNC: 276 MG/DL (ref 70–130)
GLUCOSE BLDC GLUCOMTR-MCNC: 344 MG/DL (ref 70–130)
GLUCOSE SERPL-MCNC: 259 MG/DL (ref 65–99)
HCT VFR BLD AUTO: 39.2 % (ref 37.5–51)
HGB BLD-MCNC: 13.9 G/DL (ref 13–17.7)
IMM GRANULOCYTES # BLD AUTO: 0.67 10*3/MM3 (ref 0–0.05)
IMM GRANULOCYTES NFR BLD AUTO: 5.1 % (ref 0–0.5)
LYMPHOCYTES # BLD AUTO: 2.29 10*3/MM3 (ref 0.7–3.1)
LYMPHOCYTES NFR BLD AUTO: 17.4 % (ref 19.6–45.3)
MCH RBC QN AUTO: 28.8 PG (ref 26.6–33)
MCHC RBC AUTO-ENTMCNC: 35.5 G/DL (ref 31.5–35.7)
MCV RBC AUTO: 81.2 FL (ref 79–97)
MONOCYTES # BLD AUTO: 0.98 10*3/MM3 (ref 0.1–0.9)
MONOCYTES NFR BLD AUTO: 7.5 % (ref 5–12)
NEUTROPHILS NFR BLD AUTO: 69.4 % (ref 42.7–76)
NEUTROPHILS NFR BLD AUTO: 9.13 10*3/MM3 (ref 1.7–7)
NRBC BLD AUTO-RTO: 0 /100 WBC (ref 0–0.2)
PLATELET # BLD AUTO: 526 10*3/MM3 (ref 140–450)
PMV BLD AUTO: 9.4 FL (ref 6–12)
POTASSIUM SERPL-SCNC: 4 MMOL/L (ref 3.5–5.2)
PROT SERPL-MCNC: 7.1 G/DL (ref 6–8.5)
RBC # BLD AUTO: 4.83 10*6/MM3 (ref 4.14–5.8)
SODIUM SERPL-SCNC: 129 MMOL/L (ref 136–145)
WBC # BLD AUTO: 13.15 10*3/MM3 (ref 3.4–10.8)

## 2020-12-10 PROCEDURE — 63710000001 INSULIN DETEMIR PER 5 UNITS: Performed by: FAMILY MEDICINE

## 2020-12-10 PROCEDURE — 94799 UNLISTED PULMONARY SVC/PX: CPT

## 2020-12-10 PROCEDURE — 63710000001 DEXAMETHASONE PER 0.25 MG: Performed by: FAMILY MEDICINE

## 2020-12-10 PROCEDURE — 82728 ASSAY OF FERRITIN: CPT | Performed by: FAMILY MEDICINE

## 2020-12-10 PROCEDURE — 82550 ASSAY OF CK (CPK): CPT | Performed by: FAMILY MEDICINE

## 2020-12-10 PROCEDURE — 82248 BILIRUBIN DIRECT: CPT | Performed by: FAMILY MEDICINE

## 2020-12-10 PROCEDURE — 25010000002 ENOXAPARIN PER 10 MG: Performed by: FAMILY MEDICINE

## 2020-12-10 PROCEDURE — 82962 GLUCOSE BLOOD TEST: CPT

## 2020-12-10 PROCEDURE — 94760 N-INVAS EAR/PLS OXIMETRY 1: CPT

## 2020-12-10 PROCEDURE — 86140 C-REACTIVE PROTEIN: CPT | Performed by: FAMILY MEDICINE

## 2020-12-10 PROCEDURE — 85025 COMPLETE CBC W/AUTO DIFF WBC: CPT | Performed by: FAMILY MEDICINE

## 2020-12-10 PROCEDURE — 63710000001 INSULIN ASPART PER 5 UNITS: Performed by: FAMILY MEDICINE

## 2020-12-10 PROCEDURE — 80053 COMPREHEN METABOLIC PANEL: CPT | Performed by: FAMILY MEDICINE

## 2020-12-10 RX ORDER — LANOLIN ALCOHOL/MO/W.PET/CERES
9 CREAM (GRAM) TOPICAL NIGHTLY
Status: DISCONTINUED | OUTPATIENT
Start: 2020-12-10 | End: 2020-12-13 | Stop reason: HOSPADM

## 2020-12-10 RX ORDER — ASCORBIC ACID 500 MG
3000 TABLET ORAL EVERY 4 HOURS
Status: DISCONTINUED | OUTPATIENT
Start: 2020-12-10 | End: 2020-12-11

## 2020-12-10 RX ORDER — MELATONIN
10000 DAILY
Status: DISCONTINUED | OUTPATIENT
Start: 2020-12-10 | End: 2020-12-13 | Stop reason: HOSPADM

## 2020-12-10 RX ORDER — ZINC SULFATE 50(220)MG
220 CAPSULE ORAL 2 TIMES DAILY
Status: DISCONTINUED | OUTPATIENT
Start: 2020-12-10 | End: 2020-12-13 | Stop reason: HOSPADM

## 2020-12-10 RX ADMIN — INSULIN ASPART 5 UNITS: 100 INJECTION, SOLUTION INTRAVENOUS; SUBCUTANEOUS at 17:48

## 2020-12-10 RX ADMIN — ATORVASTATIN CALCIUM 40 MG: 40 TABLET, FILM COATED ORAL at 20:16

## 2020-12-10 RX ADMIN — REMDESIVIR 100 MG: 100 INJECTION, POWDER, LYOPHILIZED, FOR SOLUTION INTRAVENOUS at 17:48

## 2020-12-10 RX ADMIN — DOCUSATE SODIUM 100 MG: 100 CAPSULE, LIQUID FILLED ORAL at 20:15

## 2020-12-10 RX ADMIN — ALBUTEROL SULFATE 2 PUFF: 90 AEROSOL, METERED RESPIRATORY (INHALATION) at 08:03

## 2020-12-10 RX ADMIN — MELATONIN 9 MG: at 20:16

## 2020-12-10 RX ADMIN — OXYCODONE HYDROCHLORIDE AND ACETAMINOPHEN 1 TABLET: 10; 325 TABLET ORAL at 09:05

## 2020-12-10 RX ADMIN — SODIUM CHLORIDE, PRESERVATIVE FREE 10 ML: 5 INJECTION INTRAVENOUS at 20:17

## 2020-12-10 RX ADMIN — OXYCODONE HYDROCHLORIDE AND ACETAMINOPHEN 3000 MG: 500 TABLET ORAL at 20:16

## 2020-12-10 RX ADMIN — INSULIN ASPART 3 UNITS: 100 INJECTION, SOLUTION INTRAVENOUS; SUBCUTANEOUS at 08:34

## 2020-12-10 RX ADMIN — OXYCODONE HYDROCHLORIDE AND ACETAMINOPHEN 1 TABLET: 10; 325 TABLET ORAL at 15:33

## 2020-12-10 RX ADMIN — GABAPENTIN 800 MG: 400 CAPSULE ORAL at 20:16

## 2020-12-10 RX ADMIN — SODIUM CHLORIDE, PRESERVATIVE FREE 10 ML: 5 INJECTION INTRAVENOUS at 08:34

## 2020-12-10 RX ADMIN — OXYCODONE HYDROCHLORIDE AND ACETAMINOPHEN 1 TABLET: 10; 325 TABLET ORAL at 22:14

## 2020-12-10 RX ADMIN — Medication 10000 UNITS: at 17:48

## 2020-12-10 RX ADMIN — ALBUTEROL SULFATE 2 PUFF: 90 AEROSOL, METERED RESPIRATORY (INHALATION) at 15:24

## 2020-12-10 RX ADMIN — OXYCODONE HYDROCHLORIDE AND ACETAMINOPHEN 3000 MG: 500 TABLET ORAL at 17:48

## 2020-12-10 RX ADMIN — ENOXAPARIN SODIUM 40 MG: 40 INJECTION SUBCUTANEOUS at 04:57

## 2020-12-10 RX ADMIN — INSULIN ASPART 4 UNITS: 100 INJECTION, SOLUTION INTRAVENOUS; SUBCUTANEOUS at 11:40

## 2020-12-10 RX ADMIN — ZINC SULFATE 220 MG (50 MG) CAPSULE 220 MG: CAPSULE at 20:16

## 2020-12-10 RX ADMIN — GABAPENTIN 800 MG: 400 CAPSULE ORAL at 04:53

## 2020-12-10 RX ADMIN — LOSARTAN POTASSIUM 50 MG: 50 TABLET, FILM COATED ORAL at 20:15

## 2020-12-10 RX ADMIN — INSULIN DETEMIR 20 UNITS: 100 INJECTION, SOLUTION SUBCUTANEOUS at 20:20

## 2020-12-10 RX ADMIN — DEXAMETHASONE 6 MG: 2 TABLET ORAL at 09:02

## 2020-12-10 RX ADMIN — OXYCODONE HYDROCHLORIDE AND ACETAMINOPHEN 1 TABLET: 10; 325 TABLET ORAL at 04:56

## 2020-12-10 RX ADMIN — GABAPENTIN 800 MG: 400 CAPSULE ORAL at 11:40

## 2020-12-10 RX ADMIN — ENOXAPARIN SODIUM 40 MG: 40 INJECTION SUBCUTANEOUS at 17:48

## 2020-12-10 RX ADMIN — ALBUTEROL SULFATE 2 PUFF: 90 AEROSOL, METERED RESPIRATORY (INHALATION) at 10:48

## 2020-12-10 RX ADMIN — SERTRALINE HYDROCHLORIDE 100 MG: 50 TABLET ORAL at 09:05

## 2020-12-10 NOTE — PLAN OF CARE
Problem: Adult Inpatient Plan of Care  Goal: Plan of Care Review  Recent Flowsheet Documentation  Taken 12/10/2020 2844 by Maria Luz Contreras RN  Progress: improving  Plan of Care Reviewed With: patient  Outcome Summary: vss. patient on 2L nasal cannula. resting between care. still complains of chest tightness but relieved with percocet. will continue to monitor.   Goal Outcome Evaluation:  Plan of Care Reviewed With: patient  Progress: improving  Outcome Summary: vss. patient on 2L nasal cannula. resting between care. still complains of chest tightness but relieved with percocet. will continue to monitor.

## 2020-12-10 NOTE — PROGRESS NOTES
[facility] Medicine Services  INPATIENT PROGRESS NOTE    Length of Stay: 2  Date of Admission: 12/8/2020  Primary Care Physician: Efrem Deng MD    Subjective   Chief Complaint: shortness of breath with minimal effort    H&P by Dr. Zimmerman:  Patient is a 45-year-old male with past medical history notable for diabetes mellitus, hyperlipidemia, hypertension, and chronic pain.  Patient presented to the emergency department due to worsening dyspnea.  Per patient report he had tested positive for COVID-19 at outside facility prior to arrival today.  He states that he first tested positive for Thanksgiving and his wife was positive prior to that.  He notes he has been having worsening shortness of breath with exertion the last 2 to 3 days now.  He notes that he had cough initially but this seems improved today.  He also had some initial nausea and vomiting but this is also better.  He also noted some difficulties with headache but that also seems somewhat improved.  He denies any body aches.  He also notes that he has had issues with his sense of taste and smell.     Patient was noted to be hypoxic on room air to 88% so he was placed on supplemental oxygen in the ED.  Chest x-ray was read as concerning for bilateral infiltrative changes consistent with Covid pneumonitis.  CBC showed a WBC of 15.1 with normal hemoglobin and hematocrit.  He did have a thrombosed cytosis with platelets of 2/5/1941.  Lactate, troponin T, and proBNP were all normal.  CMP shows a glucose of 229, creatinine 1.64, sodium 132, chloride 96, CO2 21, albumin 3.3.  Blood cultures were collected in the emergency department.    Review of Systems   Constitutional: Positive for fatigue. Negative for activity change.   HENT: Negative for ear pain and sore throat.    Eyes: Negative for pain and discharge.   Respiratory: Positive for cough and shortness of breath.    Cardiovascular: Negative for chest pain  and palpitations.   Gastrointestinal: Negative for abdominal pain and nausea.   Endocrine: Negative for cold intolerance and heat intolerance.   Genitourinary: Negative for difficulty urinating and dysuria.   Musculoskeletal: Negative for arthralgias and gait problem.   Skin: Negative for color change and rash.   Neurological: Negative for dizziness and weakness.   Psychiatric/Behavioral: Negative for agitation and confusion.        Objective    Temp:  [96.5 °F (35.8 °C)-97 °F (36.1 °C)] 96.5 °F (35.8 °C)  Heart Rate:  [60-87] 78  Resp:  [16-18] 16  BP: ()/() 110/70    Physical Exam  Constitutional:       Appearance: He is well-developed. He is ill-appearing.   HENT:      Head: Normocephalic and atraumatic.      Nose: Nose normal.   Eyes:      Extraocular Movements: Extraocular movements intact.   Neck:      Musculoskeletal: Normal range of motion and neck supple.   Cardiovascular:      Rate and Rhythm: Normal rate and regular rhythm.   Pulmonary:      Effort: Pulmonary effort is normal.      Breath sounds: Rales present.   Abdominal:      General: Bowel sounds are normal.      Palpations: Abdomen is soft.   Musculoskeletal: Normal range of motion.   Skin:     General: Skin is warm and dry.   Neurological:      Mental Status: He is alert and oriented to person, place, and time.   Psychiatric:         Behavior: Behavior normal.       Results Review:  I have reviewed the labs, radiology results, and diagnostic studies.    Laboratory Data:   Results from last 7 days   Lab Units 12/10/20  0610 12/09/20  0656 12/08/20  1351   SODIUM mmol/L 129* 130* 132*   POTASSIUM mmol/L 4.0 4.7 4.2   CHLORIDE mmol/L 96* 97* 96*   CO2 mmol/L 23.0 21.0* 21.0*   BUN mg/dL 18 16 19   CREATININE mg/dL 0.49* 0.54* 0.70*  0.64*   GLUCOSE mg/dL 259* 301* 229*   CALCIUM mg/dL 8.8 9.4 9.0   BILIRUBIN mg/dL 0.7 0.7 0.7  0.7   ALK PHOS U/L 69 66 73  73   ALT (SGPT) U/L 21 22 30  29   AST (SGOT) U/L 17 18 24  25   ANION GAP mmol/L  10.0 12.0 15.0     Estimated Creatinine Clearance: 238.9 mL/min (A) (by C-G formula based on SCr of 0.49 mg/dL (L)).          Results from last 7 days   Lab Units 12/10/20  0610 12/09/20  0656 12/08/20  1351   WBC 10*3/mm3 13.15* 10.70 15.10*   HEMOGLOBIN g/dL 13.9 14.1 15.4   HEMATOCRIT % 39.2 39.9 44.6   PLATELETS 10*3/mm3 526* 564* 542*           Culture Data:   Blood Culture   Date Value Ref Range Status   12/08/2020 No growth at 24 hours  Preliminary   12/08/2020 No growth at 24 hours  Preliminary     No results found for: URINECX  No results found for: RESPCX  No results found for: WOUNDCX  No results found for: STOOLCX  No components found for: BODYFLD    Radiology Data:   Imaging Results (Last 24 Hours)     ** No results found for the last 24 hours. **          I have reviewed the patient's current medications.     Assessment/Plan   Assessment and Plan    Acute respiratory failure with hypoxemia     Due to below; wean oxygen as able    Bilateral viral pneumonia     Caused by below; continue with supportive therapy    COVID 19 infection     On dexamethasone, remdesivir, lovenox; will add zinc and vitamin D3 and melatonin    Hyperglycemia on DM type 2     Mildly elevated likely due to steroids use;     Continue with SSI       Chronic medical problems     Essential hypertension     Hyperlipidemia

## 2020-12-10 NOTE — PLAN OF CARE
Problem: Adult Inpatient Plan of Care  Goal: Plan of Care Review  Recent Flowsheet Documentation  Taken 12/10/2020 0430 by Bernadette Villegas RN  Progress: no change  Plan of Care Reviewed With: patient  Outcome Summary: vss, 3L nasal cannula, resting well between care, will cont to monitor

## 2020-12-10 NOTE — PAYOR COMM NOTE
"Rosalinda Goncalves  [facility]  P: [phone]  F: [phone]    Ref#[id number]    Trevor Harkins (45 y.o. Male)     Date of Birth Social Security Number Address Home Phone MRN    [id number]  [address] [place] [address] [phone] [id number]    Synagogue Marital Status          Protestant        Admission Date Admission Type Admitting Provider Attending Provider Department, Room/Bed    12/8/20 Emergency Sumeet Zimmerman MD Craig, David A, MD [address], 427/1    Discharge Date Discharge Disposition Discharge Destination                       Attending Provider: Sumeet Zimmerman MD    Allergies: Ambien [Zolpidem Tartrate]    Isolation: Enh Drop/Con   Infection: COVID (confirmed) (12/08/20)   Code Status: CPR    Ht: 177.8 cm (70\")   Wt: 110 kg (242 lb 12.8 oz)    Admission Cmt: None   Principal Problem: None                Active Insurance as of 12/8/2020     Primary Coverage     Payor Plan Insurance Group Employer/Plan Group    [employer] BLUE CROSS AN[employer] CROSS BLUE SHIELD PPO [id number]     Payor Plan Address Payor Plan Phone Number Payor Plan Fax Number Effective Dates    [phone]  6/1/2020 - None Entered    [place] [address]       Subscriber Name Subscriber Birth Date Member ID       TREVOR HARKINS 1975 [id number]                 Emergency Contacts      (Rel.) Home Phone Work Phone Mobile Phone    Verónica Harkins (Spouse) [phone]               History & Physical      Sumeet Zimmerman MD at 12/08/20 1437                [facility] Medicine Admission      Date of Admission: 12/8/2020      Primary Care Physician: Efrem Deng MD      Chief Complaint: Shortness of breath    HPI: Patient is a 45-year-old male with past medical history notable for diabetes mellitus, hyperlipidemia, hypertension, and chronic pain.  Patient presented to " the emergency department due to worsening dyspnea.  Per patient report he had tested positive for COVID-19 at outside facility prior to arrival today.  He states that he first tested positive for Thanksgiving and his wife was positive prior to that.  He notes he has been having worsening shortness of breath with exertion the last 2 to 3 days now.  He notes that he had cough initially but this seems improved today.  He also had some initial nausea and vomiting but this is also better.  He also noted some difficulties with headache but that also seems somewhat improved.  He denies any body aches.  He also notes that he has had issues with his sense of taste and smell.    Patient was noted to be hypoxic on room air to 88% so he was placed on supplemental oxygen in the ED.  Chest x-ray was read as concerning for bilateral infiltrative changes consistent with Covid pneumonitis.  CBC showed a WBC of 15.1 with normal hemoglobin and hematocrit.  He did have a thrombosed cytosis with platelets of 2/5/1941.  Lactate, troponin T, and proBNP were all normal.  CMP shows a glucose of 229, creatinine 1.64, sodium 132, chloride 96, CO2 21, albumin 3.3.  Blood cultures were collected in the emergency department.    Concurrent Medical History:  has a past medical history of Chronic back pain, Diabetes mellitus (CMS/[facility]), Diabetic neuropathy (CMS/[facility]), History of migraine headaches, Hypercholesterolemia, Hypercholesterolemia, Hypertension, and Obesity.    Past Surgical History:  has a past surgical history that includes Back surgery; lumbar laminectomy with fusion (N/A, 11/29/2017); Lumbar fusion (N/A, 11/27/2017); and Anterior Lumbar Exposure (N/A, 11/27/2017).    Family History: family history includes Diabetes in his father and another family member; Heart disease in his father and another family member.  No changes    Social History:  reports that he has never smoked. He has quit using smokeless tobacco.  His smokeless tobacco use  included chew. He reports that he does not drink alcohol or use drugs.    Allergies:   Allergies   Allergen Reactions   • Ambien [Zolpidem Tartrate] Confusion       Medications:   Prior to Admission medications    Medication Sig Start Date End Date Taking? Authorizing Provider   atorvastatin (Lipitor) 40 MG tablet Take 1 tablet by mouth Every Night. 8/14/20   Efrem Deng MD   butalbital-acetaminophen-caffeine (FIORICET, ESGIC) -40 MG per tablet TAKE 1 TABLET BY MOUTH EVERY 4 HOURS AS NEEDED FOR HEADACHE 12/1/20   Efrem Deng MD   gabapentin (NEURONTIN) 800 MG tablet Take 800 mg by mouth 3 (Three) Times a Day.    Chantal Arriaga MD   Insulin Glargine, 2 Unit Dial, (Toujeo Max SoloStar) 300 UNIT/ML solution pen-injector injection Inject 20 Units under the skin into the appropriate area as directed Daily. 9/28/20   Ronak Nieves APRN   Insulin Pen Needle (PEN NEEDLES) 31G X 8 MM misc use as indicated 4 times daily. 2/14/17   Ruben Reid MD   losartan (COZAAR) 50 MG tablet Take 1 tablet by mouth Every Night. 8/19/20   Jie Beatty APRN   ondansetron (ZOFRAN) 4 MG tablet Take 1 tablet by mouth Every 6 (Six) Hours As Needed for Nausea or Vomiting. 2/8/19   Efrem Deng MD   oxyCODONE-acetaminophen (PERCOCET)  MG per tablet Take 1 tablet by mouth Every 6 (Six) Hours. 11/2/20   Chantal Arriaga MD   promethazine (PHENERGAN) 25 MG tablet Take 1 tablet by mouth Every 6 (Six) Hours As Needed for Nausea or Vomiting. 2/11/20   Efrem Deng MD   Semaglutide,0.25 or 0.5MG/DOS, (Ozempic, 0.25 or 0.5 MG/DOSE,) 2 MG/1.5ML solution pen-injector Inject 0.5 mg under the skin into the appropriate area as directed 1 (One) Time Per Week.    Chantal Arriaga MD   Semaglutide,0.25 or 0.5MG/DOS, (Ozempic, 0.25 or 0.5 MG/DOSE,) 2 MG/1.5ML solution pen-injector Inject 0.5 mg under the skin into the appropriate area as directed 1 (One) Time Per  Week. 9/28/20   Ronak Nieves APRN   sertraline (ZOLOFT) 100 MG tablet TAKE 1 TABLET BY MOUTH DAILY 1/13/20   Efrem Deng MD   tiZANidine (ZANAFLEX) 4 MG tablet Take 1 tablet by mouth Every 8 (Eight) Hours As Needed for Muscle Spasms. 11/30/17   SHERI Lunsford MD       Review of Systems:  Review of Systems   Constitutional: Negative for chills and fever.   HENT: Negative for congestion.    Eyes: Negative for visual disturbance.   Respiratory: Positive for cough and shortness of breath.    Cardiovascular: Negative for chest pain.   Gastrointestinal: Positive for nausea and vomiting. Negative for abdominal pain, constipation and diarrhea.   Genitourinary: Negative for difficulty urinating.   Musculoskeletal: Positive for back pain. Negative for arthralgias.   Skin: Negative for rash.   Neurological: Positive for headaches. Negative for weakness.   Psychiatric/Behavioral: Negative.    All other systems reviewed and are negative.     Otherwise complete ROS is negative except as mentioned above.    Physical Exam:   Temp:  [98 °F (36.7 °C)-98.6 °F (37 °C)] 98 °F (36.7 °C)  Heart Rate:  [86-95] 92  Resp:  [20] 20  BP: (122-135)/(79-86) 131/79  Physical Exam  Constitutional:       Comments: Mildly ill-appearing but in no distress   HENT:      Head: Normocephalic and atraumatic.      Right Ear: External ear normal.      Left Ear: External ear normal.      Nose: Nose normal.      Mouth/Throat:      Mouth: Mucous membranes are moist.      Pharynx: Oropharynx is clear.   Eyes:      Extraocular Movements: Extraocular movements intact.      Conjunctiva/sclera: Conjunctivae normal.   Neck:      Musculoskeletal: No muscular tenderness.   Cardiovascular:      Rate and Rhythm: Normal rate and regular rhythm.      Pulses: Normal pulses.      Heart sounds: Normal heart sounds.   Pulmonary:      Effort: Pulmonary effort is normal. No respiratory distress.      Breath sounds: Normal breath sounds.   Abdominal:        General: Bowel sounds are normal.      Palpations: Abdomen is soft.      Tenderness: There is no abdominal tenderness.   Skin:     General: Skin is warm and dry.      Capillary Refill: Capillary refill takes less than 2 seconds.   Neurological:      General: No focal deficit present.      Mental Status: He is alert and oriented to person, place, and time. Mental status is at baseline.      Coordination: Coordination normal.   Psychiatric:         Mood and Affect: Mood normal.         Behavior: Behavior normal.           Results Reviewed:  I have personally reviewed current lab, radiology, and data and agree with results.  Lab Results (last 24 hours)     Procedure Component Value Units Date/Time    Troponin [[id number]]  (Normal) Collected: 12/08/20 1351    Specimen: Blood Updated: 12/08/20 1422     Troponin T <0.010 ng/mL     Narrative:      Troponin T Reference Range:  <= 0.03 ng/mL-   Negative for AMI  >0.03 ng/mL-     Abnormal for myocardial necrosis.  Clinicians would have to utilize clinical acumen, EKG, Troponin and serial changes to determine if it is an Acute Myocardial Infarction or myocardial injury due to an underlying chronic condition.       Results may be falsely decreased if patient taking Biotin.      BNP [[id number]]  (Normal) Collected: 12/08/20 1351    Specimen: Blood Updated: 12/08/20 1420     proBNP 35.5 pg/mL     Narrative:      Among patients with dyspnea, NT-proBNP is highly sensitive for the detection of acute congestive heart failure. In addition NT-proBNP of <300 pg/ml effectively rules out acute congestive heart failure with 99% negative predictive value.    Results may be falsely decreased if patient taking Biotin.      Lactic Acid, Plasma [[id number]]  (Normal) Collected: 12/08/20 1351    Specimen: Blood Updated: 12/08/20 1419     Lactate 1.8 mmol/L     CBC & Differential [[id number]]  (Abnormal) Collected: 12/08/20 1351    Specimen: Blood Updated: 12/08/20 1408    Narrative:      The  following orders were created for panel order CBC & Differential.  Procedure                               Abnormality         Status                     ---------                               -----------         ------                     Scan Slide[[id number]]                                       In process                 CBC Auto Differential[[id number]]        Abnormal            Final result                 Please view results for these tests on the individual orders.    CBC Auto Differential [[id number]]  (Abnormal) Collected: 12/08/20 1351    Specimen: Blood Updated: 12/08/20 1408     WBC 15.10 10*3/mm3      RBC 5.40 10*6/mm3      Hemoglobin 15.4 g/dL      Hematocrit 44.6 %      MCV 82.6 fL      MCH 28.5 pg      MCHC 34.5 g/dL      RDW 12.9 %      RDW-SD 38.5 fl      MPV 8.8 fL      Platelets 542 10*3/mm3      Neutrophil % 67.5 %      Lymphocyte % 15.4 %      Monocyte % 7.8 %      Eosinophil % 1.1 %      Basophil % 0.1 %      Immature Grans % 8.1 %      Neutrophils, Absolute 10.18 10*3/mm3      Lymphocytes, Absolute 2.33 10*3/mm3      Monocytes, Absolute 1.18 10*3/mm3      Eosinophils, Absolute 0.17 10*3/mm3      Basophils, Absolute 0.01 10*3/mm3      Immature Grans, Absolute 1.23 10*3/mm3      nRBC 0.0 /100 WBC     Scan Slide [[id number]] Collected: 12/08/20 1351    Specimen: Blood Updated: 12/08/20 1400    Light Blue Top [[id number]] Collected: 12/08/20 1351    Specimen: Blood Updated: 12/08/20 1357    Comprehensive Metabolic Panel [[id number]] Collected: 12/08/20 1351    Specimen: Blood Updated: 12/08/20 1357    Green Top (Gel) [[id number]] Collected: 12/08/20 1351    Specimen: Blood Updated: 12/08/20 1357    Lavender Top [[id number]] Collected: 12/08/20 1351    Specimen: Blood Updated: 12/08/20 1357    [place] Draw [[id number]] Collected: 12/08/20 1351    Specimen: Blood Updated: 12/08/20 1357    Narrative:      The following orders were created for panel order [place] Draw.  Procedure                                Abnormality         Status                     ---------                               -----------         ------                     Light Blue Top[[id number]]                                   In process                 Green Top (Gel)[[id number]]                                  In process                 Lavender Top[[id number]]                                     In process                 Gold Top - SST[[id number]]                                   In process                   Please view results for these tests on the individual orders.    Gold Top - SST [[id number]] Collected: 12/08/20 1351    Specimen: Blood Updated: 12/08/20 1357        Imaging Results (Last 24 Hours)     Procedure Component Value Units Date/Time    XR Chest 1 View [[id number]] Collected: 12/08/20 1401     Updated: 12/08/20 1426    Narrative:      Chest x-ray single view.       CLINICAL INDICATION: Shortness of breath. Triage protocol. Covid  +    COMPARISON: March 29, 2017.    FINDINGS: Cardiac silhouette is normal in size. Pulmonary  vascularity is unremarkable.     Bilateral somewhat peripheral appearing infiltrative changes  strongly suggesting Covid pneumonitis.      Impression:      Bilateral somewhat peripheral appearing infiltrative  changes, pneumonitis. The distribution  suggests COVID   pneumonitis.    Electronically signed by:  Carlos Alberto Aguiar MD  12/8/2020 2:25 PM CST  Workstation: [id number]            Assessment:    Active Hospital Problems    Diagnosis   • Acute respiratory failure with hypoxia (CMS/HCC)   • Pneumonia due to COVID-19 virus   • Essential hypertension   • Type 2 diabetes mellitus with hyperglycemia, with long-term current use of insulin (CMS/HCC)             Plan:  -We will admit patient for observation continue supplemental oxygen, wean as tolerated  -We will start the patient on Decadron 6 mg daily  -We will also start patient on remdesivir, risk vs benefits discuss.  -However and start him on some prophylactic  antibiotics pending his procalcitonin that we will also check as well as his other Covid monitoring labs. Rocephin and Zmax orders.   -We will start bronchodilators with scheduled albuterol  -Blood cultures were collected in the emergency department  -We will order Accu-Cheks and place on sliding scale insulin for his glycemic control  -Home medications will be reordered as appropriate  -DVT prophylaxis with Lovenox twice daily per current standards and guidelines  -CODE STATUS: Full    The patient was evaluated during the global COVID-19 pandemic, and the diagnosis was suspected/considered upon their initial presentation.  Evaluation, treatment, and testing were consistent with current guidelines for patients who present with complaints or symptoms that may be related to COVID-19.    I discussed the patient's findings and my recommendations with: the patient    Sumeet Zimmerman MD            Electronically signed by Sumeet Zimmerman MD at 12/08/20 1526          Emergency Department Notes      Trevor Nelson MD at 12/08/20 1418      Procedure Orders    1. ECG 12 Lead [[id number]] ordered by Trevor Nelson MD               Subjective   Patient states that he tested positive for COVID-19 on November 28.  His wife was positive before that.  He presents emergency department with respiratory distress.  He denies having ever smoked and does not use home oxygen.        Shortness of Breath  Severity:  Moderate  Onset quality:  Gradual  Duration:  10 days  Timing:  Constant  Progression:  Worsening  Chronicity:  New  Relieved by:  Oxygen  Worsened by:  Exertion and movement  Associated symptoms: cough and diaphoresis    Associated symptoms: no abdominal pain, no chest pain, no ear pain, no fever, no headaches, no neck pain, no rash, no sore throat, no vomiting and no wheezing    Risk factors: no tobacco use        Review of Systems   Constitutional: Positive for activity change, diaphoresis and fatigue. Negative  for appetite change, chills and fever.   HENT: Negative for congestion, ear discharge, ear pain, nosebleeds, rhinorrhea, sinus pressure, sore throat and trouble swallowing.    Eyes: Negative for discharge and redness.   Respiratory: Positive for cough and shortness of breath. Negative for apnea, chest tightness and wheezing.    Cardiovascular: Negative for chest pain.   Gastrointestinal: Negative for abdominal pain, diarrhea, nausea and vomiting.   Endocrine: Negative for polyuria.   Genitourinary: Negative for dysuria, frequency and urgency.   Musculoskeletal: Negative for myalgias and neck pain.   Skin: Negative for color change and rash.   Allergic/Immunologic: Negative for immunocompromised state.   Neurological: Positive for dizziness and weakness. Negative for seizures, syncope, light-headedness and headaches.   Hematological: Negative for adenopathy. Does not bruise/bleed easily.   Psychiatric/Behavioral: Negative for behavioral problems and confusion.   All other systems reviewed and are negative.      Past Medical History:   Diagnosis Date   • Chronic back pain    • Diabetes mellitus (CMS/HCC)    • Diabetic neuropathy (CMS/HCC)    • History of migraine headaches    • Hypercholesterolemia    • Hypercholesterolemia    • Hypertension    • Obesity    • Pneumonia due to COVID-19 virus 12/8/2020       Allergies   Allergen Reactions   • Ambien [Zolpidem Tartrate] Confusion       Past Surgical History:   Procedure Laterality Date   • ANTERIOR LUMBAR EXPOSURE N/A 11/27/2017    Procedure: ANTERIOR LUMBAR EXPOSURE;  Surgeon: Devon Guan DO;  Location:  [facility] OR;  Service:    • BACK SURGERY      X 2   • LUMBAR FUSION N/A 11/27/2017    Procedure: ANTERIOR DECOMPRESSION, ANTERIOR LUMBAR INTERBODY FUSION WITH INSTRUMENTATION L5-S1;  Surgeon: SHERI Lunsford MD;  Location: [facility] OR;  Service:    • LUMBAR LAMINECTOMY WITH FUSION N/A 11/29/2017    Procedure: POSTERIOR SPINAL FUSION WITH INSTRUMENTATION L5-S1;   Surgeon: SHERI Lunsford MD;  Location: [facility] OR;  Service:        Family History   Problem Relation Age of Onset   • Diabetes Other    • Heart disease Other    • Diabetes Father    • Heart disease Father        Social History     Socioeconomic History   • Marital status:      Spouse name: Not on file   • Number of children: Not on file   • Years of education: Not on file   • Highest education level: Not on file   Tobacco Use   • Smoking status: Never Smoker   • Smokeless tobacco: Former User     Types: Chew   Substance and Sexual Activity   • Alcohol use: No   • Drug use: No   • Sexual activity: Defer           Objective   Physical Exam  Vitals signs and nursing note reviewed.   Constitutional:       Appearance: He is well-developed.   HENT:      Head: Normocephalic and atraumatic.      Nose: Nose normal.   Eyes:      General: No scleral icterus.        Right eye: No discharge.         Left eye: No discharge.      Conjunctiva/sclera: Conjunctivae normal.      Pupils: Pupils are equal, round, and reactive to light.   Neck:      Musculoskeletal: Normal range of motion and neck supple.      Trachea: No tracheal deviation.   Cardiovascular:      Rate and Rhythm: Normal rate and regular rhythm.      Heart sounds: Normal heart sounds. No murmur.   Pulmonary:      Effort: Pulmonary effort is normal. Tachypnea present. No respiratory distress.      Breath sounds: No stridor. Decreased breath sounds, wheezing and rhonchi present. No rales.   Abdominal:      General: Bowel sounds are normal. There is no distension.      Palpations: Abdomen is soft. There is no mass.      Tenderness: There is no abdominal tenderness. There is no guarding or rebound.   Skin:     General: Skin is warm and dry.      Findings: No erythema or rash.   Neurological:      Mental Status: He is alert and oriented to person, place, and time.      Coordination: Coordination normal.   Psychiatric:         Behavior: Behavior normal.          Thought Content: Thought content normal.         ECG 12 Lead      Date/Time: 12/8/2020 2:42 PM  Performed by: Trevor Nelson MD  Authorized by: Trevor Nelson MD   Interpreted by physician  Rhythm: sinus rhythm  Rate: normal  BPM: 92  ST Segments: ST segments normal                  ED Course             Labs Reviewed   COMPREHENSIVE METABOLIC PANEL - Abnormal; Notable for the following components:       Result Value    Glucose 229 (*)     Creatinine 0.64 (*)     Sodium 132 (*)     Chloride 96 (*)     CO2 21.0 (*)     Albumin 3.30 (*)     BUN/Creatinine Ratio 29.7 (*)     All other components within normal limits    Narrative:     GFR Normal >60  Chronic Kidney Disease <60  Kidney Failure <15     CBC WITH AUTO DIFFERENTIAL - Abnormal; Notable for the following components:    WBC 15.10 (*)     Platelets 542 (*)     Lymphocyte % 15.4 (*)     Immature Grans % 8.1 (*)     Neutrophils, Absolute 10.18 (*)     Monocytes, Absolute 1.18 (*)     Immature Grans, Absolute 1.23 (*)     All other components within normal limits   MANUAL DIFFERENTIAL - Abnormal; Notable for the following components:    Lymphocyte % 12.0 (*)     Metamyelocyte % 6.0 (*)     Myelocyte % 4.0 (*)     Promyelocyte % 1.0 (*)     Neutrophils Absolute 9.51 (*)     Monocytes Absolute 1.36 (*)     Eosinophils Absolute 0.76 (*)     All other components within normal limits   FERRITIN - Abnormal; Notable for the following components:    Ferritin 1,490.00 (*)     All other components within normal limits    Narrative:     Results may be falsely decreased if patient taking Biotin.     LACTATE DEHYDROGENASE - Abnormal; Notable for the following components:     (*)     All other components within normal limits   D-DIMER, QUANTITATIVE - Abnormal; Notable for the following components:    D-Dimer, Quantitative 1,193 (*)     All other components within normal limits    Narrative:     Dimer values <500 ng/ml FEU are FDA approved as aid in diagnosis  of deep venous thrombosis and pulmonary embolism.  This test should not be used in an exclusion strategy with pretest probability alone.    A recent guideline regarding diagnosis for pulmonary thromboembolism recommends an adjusted exclusion criterion of age x 10 ng/ml FEU for patients >50 years of age ([employer]uri Intern Med 2015; 163: 701-711).     HEPATIC FUNCTION PANEL - Abnormal; Notable for the following components:    Albumin 3.30 (*)     All other components within normal limits   CREATININE, SERUM - Abnormal; Notable for the following components:    Creatinine 0.70 (*)     All other components within normal limits    Narrative:     GFR Normal >60  Chronic Kidney Disease <60  Kidney Failure <15     POCT GLUCOSE FINGERSTICK - Abnormal; Notable for the following components:    Glucose 225 (*)     All other components within normal limits   BNP (IN-HOUSE) - Normal    Narrative:     Among patients with dyspnea, NT-proBNP is highly sensitive for the detection of acute congestive heart failure. In addition NT-proBNP of <300 pg/ml effectively rules out acute congestive heart failure with 99% negative predictive value.    Results may be falsely decreased if patient taking Biotin.     TROPONIN (IN-HOUSE) - Normal    Narrative:     Troponin T Reference Range:  <= 0.03 ng/mL-   Negative for AMI  >0.03 ng/mL-     Abnormal for myocardial necrosis.  Clinicians would have to utilize clinical acumen, EKG, Troponin and serial changes to determine if it is an Acute Myocardial Infarction or myocardial injury due to an underlying chronic condition.       Results may be falsely decreased if patient taking Biotin.     LACTIC ACID, PLASMA - Normal   PROCALCITONIN - Normal    Narrative:     As a Marker for Sepsis (Non-Neonates):   1. <0.5 ng/mL represents a low risk of severe sepsis and/or septic shock.  1. >2 ng/mL represents a high risk of severe sepsis and/or septic shock.    As a Marker for Lower Respiratory Tract Infections that  "require antibiotic therapy:  PCT on Admission     Antibiotic Therapy             6-12 Hrs later  > 0.5                Strongly Recommended            >0.25 - <0.5         Recommended  0.1 - 0.25           Discouraged                   Remeasure/reassess PCT  <0.1                 Strongly Discouraged          Remeasure/reassess PCT      As 28 day mortality risk marker: \"Change in Procalcitonin Result\" (> 80 % or <=80 %) if Day 0 (or Day 1) and Day 4 values are available. Refer to http://www.Olapicpct-calculator.[place]/   Change in PCT <=80 %   A decrease of PCT levels below or equal to 80 % defines a positive change in PCT test result representing a higher risk for 28-day all-cause mortality of patients diagnosed with severe sepsis or septic shock.  Change in PCT > 80 %   A decrease of PCT levels of more than 80 % defines a negative change in PCT result representing a lower risk for 28-day all-cause mortality of patients diagnosed with severe sepsis or septic shock.                Results may be falsely decreased if patient taking Biotin.    PROCALCITONIN - Normal    Narrative:     As a Marker for Sepsis (Non-Neonates):   1. <0.5 ng/mL represents a low risk of severe sepsis and/or septic shock.  1. >2 ng/mL represents a high risk of severe sepsis and/or septic shock.    As a Marker for Lower Respiratory Tract Infections that require antibiotic therapy:  PCT on Admission     Antibiotic Therapy             6-12 Hrs later  > 0.5                Strongly Recommended            >0.25 - <0.5         Recommended  0.1 - 0.25           Discouraged                   Remeasure/reassess PCT  <0.1                 Strongly Discouraged          Remeasure/reassess PCT      As 28 day mortality risk marker: \"Change in Procalcitonin Result\" (> 80 % or <=80 %) if Day 0 (or Day 1) and Day 4 values are available. Refer to [email or web address]-calculator.[email or web address]/   Change in PCT <=80 %   A decrease of PCT levels below or equal to 80 % defines a " positive change in PCT test result representing a higher risk for 28-day all-cause mortality of patients diagnosed with severe sepsis or septic shock.  Change in PCT > 80 %   A decrease of PCT levels of more than 80 % defines a negative change in PCT result representing a lower risk for 28-day all-cause mortality of patients diagnosed with severe sepsis or septic shock.                Results may be falsely decreased if patient taking Biotin.    BLOOD CULTURE   BLOOD CULTURE   COVID-19,[facility] IN-HOUSE, NP SWAB IN TRANSPORT MEDIA 8-10 HR TAT   RAINBOW DRAW    Narrative:     The following orders were created for panel order [place] Draw.  Procedure                               Abnormality         Status                     ---------                               -----------         ------                     Light Blue Top[[id number]]                                   Final result               Green Top (Gel)[[id number]]                                  Final result               Lavender Top[[id number]]                                     Final result               Gold Top - SST[[id number]]                                   Final result                 Please view results for these tests on the individual orders.   POCT GLUCOSE FINGERSTICK   POCT GLUCOSE FINGERSTICK   POCT GLUCOSE FINGERSTICK   POCT GLUCOSE FINGERSTICK   POCT GLUCOSE FINGERSTICK   CBC AND DIFFERENTIAL    Narrative:     The following orders were created for panel order CBC & Differential.  Procedure                               Abnormality         Status                     ---------                               -----------         ------                     Scan Slide[[id number]]                                                                  CBC Auto Differential[[id number]]        Abnormal            Final result                 Please view results for these tests on the individual orders.   LIGHT BLUE TOP   GREEN TOP   LAVENDER TOP   GOLD TOP -  SST       XR Chest 1 View   Final Result   Bilateral somewhat peripheral appearing infiltrative   changes, pneumonitis. The distribution  suggests COVID    pneumonitis.      Electronically signed by:  Carlos Alberto Aguiar MD  12/8/2020 2:25 PM [facility]   Workstation: [id number]                                          [facility]    Final diagnoses:   Pneumonia due to COVID-19 virus   Acute respiratory failure with hypoxia (CMS/HCC)            Trevor Nelson MD  12/08/20 1443       Trevor Nelson MD  12/08/20 1946      Electronically signed by Trevor Nelson MD at 12/08/20 1946     Karen Ruiz RN at 12/08/20 1541        Tried to call report, left on hold.      Karen Ruiz RN  12/08/20 1541      Electronically signed by Karen Ruiz RN at 12/08/20 1541         Facility-Administered Medications as of 12/10/2020   Medication Dose Route Frequency Provider Last Rate Last Admin   • acetaminophen (TYLENOL) tablet 650 mg  650 mg Oral Q4H PRN Sumeet Zimmerman MD        Or   • acetaminophen (TYLENOL) suppository 650 mg  650 mg Rectal Q4H PRN Sumeet Zimmerman MD       • albuterol sulfate HFA (PROVENTIL HFA;VENTOLIN HFA;PROAIR HFA) inhaler 2 puff  2 puff Inhalation 4x Daily - RT Sumeet Zimmerman MD   2 puff at 12/10/20 0803   • atorvastatin (LIPITOR) tablet 40 mg  40 mg Oral Nightly Sumeet Zimmerman MD   40 mg at 12/09/20 2000   • benzonatate (TESSALON) capsule 200 mg  200 mg Oral TID PRN Sumeet Zimmerman MD       • bisacodyl (DULCOLAX) suppository 10 mg  10 mg Rectal Daily PRN Sumeet Zimmerman MD       • calcium carbonate (TUMS) chewable tablet 500 mg (200 mg elemental)  2 tablet Oral BID PRN Sumeet Zimmerman MD       • dexamethasone (DECADRON) tablet 6 mg  6 mg Oral Daily With Breakfast Sumeet Zimmerman MD   6 mg at 12/10/20 0902    Or   • dexamethasone (DECADRON) injection 6 mg  6 mg Intravenous Daily With Breakfast Sumeet Zimmerman MD       • dextrose (D50W) 25 g/ 50mL Intravenous Solution 25 g  25 g Intravenous Q15 Min PRN  Sumeet Zimmerman MD       • dextrose (GLUTOSE) oral gel 15 g  15 g Oral Q15 Min PRN Sumeet Zimmerman MD       • docusate sodium (COLACE) capsule 100 mg  100 mg Oral BID Sumeet Zimmerman MD   100 mg at 12/09/20 2000   • enoxaparin (LOVENOX) syringe 40 mg  40 mg Subcutaneous Q12H Sumeet Zimmerman MD   40 mg at 12/10/20 0457   • gabapentin (NEURONTIN) capsule 800 mg  800 mg Oral Q8H Sumeet Zimmerman MD   800 mg at 12/10/20 0453   • glucagon (human recombinant) (GLUCAGEN DIAGNOSTIC) injection 1 mg  1 mg Subcutaneous Q15 Min PRN Sumeet Zimmerman MD       • insulin aspart (novoLOG) injection 0-7 Units  0-7 Units Subcutaneous TID AC Sumeet Zimmerman MD   3 Units at 12/10/20 0834   • insulin detemir (LEVEMIR) injection 20 Units  20 Units Subcutaneous Nightly Sumeet Zimmerman MD   20 Units at 12/09/20 2001   • losartan (COZAAR) tablet 50 mg  50 mg Oral Nightly Sumeet Zimmerman MD   50 mg at 12/09/20 2000   • ondansetron (ZOFRAN) tablet 4 mg  4 mg Oral Q6H PRN Sumeet Zimmerman MD        Or   • ondansetron (ZOFRAN) injection 4 mg  4 mg Intravenous Q6H PRN Sumeet Zimmeramn MD       • oxyCODONE-acetaminophen (PERCOCET)  MG per tablet 1 tablet  1 tablet Oral Q4H PRN Concepción Yepez APRN   1 tablet at 12/10/20 0905   • Pharmacy Consult - Remdesivir   Does not apply Continuous PRN Sumeet Zimmerman MD       • [COMPLETED] remdesivir 200 mg in sodium chloride 0.9 % 250 mL IVPB (powder vial)  200 mg Intravenous Q24H Sumeet Zimmerman MD   200 mg at 12/08/20 1850    Followed by   • remdesivir 100 mg in sodium chloride 0.9 % 250 mL IVPB (powder vial)  100 mg Intravenous Q24H Sumeet Zimmerman MD   100 mg at 12/09/20 1808   • sertraline (ZOLOFT) tablet 100 mg  100 mg Oral Daily Sumeet Zimmerman MD   100 mg at 12/10/20 0905   • sodium chloride 0.9 % flush 10 mL  10 mL Intravenous PRN Trevor Nelson MD       • sodium chloride 0.9 % flush 10 mL  10 mL Intravenous Q12H Sumeet Zimmerman MD   10 mL at 12/10/20 0834   • sodium chloride 0.9 % flush  10 mL  10 mL Intravenous Sumeet Fall MD         Lab Results (last 72 hours)     Procedure Component Value Units Date/Time    POC Glucose Once [[id number]]  (Abnormal) Collected: 12/10/20 0728    Specimen: Blood Updated: 12/10/20 0756     Glucose 228 mg/dL      Comment: RN NotifiedOperator: [id number] JHOAN Escamillaer ID: [id number]       Ferritin [[id number]]  (Abnormal) Collected: 12/10/20 0610    Specimen: Blood Updated: 12/10/20 0722     Ferritin 1,333.00 ng/mL     Narrative:      Results may be falsely decreased if patient taking Biotin.      Comprehensive Metabolic Panel [[id number]]  (Abnormal) Collected: 12/10/20 0610    Specimen: Blood Updated: 12/10/20 0719     Glucose 259 mg/dL      BUN 18 mg/dL      Creatinine 0.49 mg/dL      Sodium 129 mmol/L      Potassium 4.0 mmol/L      Chloride 96 mmol/L      CO2 23.0 mmol/L      Calcium 8.8 mg/dL      Total Protein 7.1 g/dL      Albumin 3.20 g/dL      ALT (SGPT) 21 U/L      AST (SGOT) 17 U/L      Alkaline Phosphatase 69 U/L      Total Bilirubin 0.7 mg/dL      eGFR Non African Amer >150 mL/min/1.73      Globulin 3.9 gm/dL      A/G Ratio 0.8 g/dL      BUN/Creatinine Ratio 36.7     Anion Gap 10.0 mmol/L     Narrative:      GFR Normal >60  Chronic Kidney Disease <60  Kidney Failure <15      CK [[id number]]  (Abnormal) Collected: 12/10/20 0610    Specimen: Blood Updated: 12/10/20 0719     Creatine Kinase 17 U/L     C-reactive Protein [[id number]]  (Abnormal) Collected: 12/10/20 0610    Specimen: Blood Updated: 12/10/20 0719     C-Reactive Protein 2.21 mg/dL     Bilirubin, Direct [[id number]]  (Normal) Collected: 12/10/20 0610    Specimen: Blood Updated: 12/10/20 0719     Bilirubin, Direct 0.2 mg/dL     CBC & Differential [[id number]]  (Abnormal) Collected: 12/10/20 0610    Specimen: Blood Updated: 12/10/20 0715    Narrative:      The following orders were created for panel order CBC & Differential.  Procedure                               Abnormality         Status                      ---------                               -----------         ------                     Scan Slide[[id number]]                                                                  CBC Auto Differential[[id number]]        Abnormal            Final result                 Please view results for these tests on the individual orders.    CBC Auto Differential [[id number]]  (Abnormal) Collected: 12/10/20 0610    Specimen: Blood Updated: 12/10/20 0710     WBC 13.15 10*3/mm3      RBC 4.83 10*6/mm3      Hemoglobin 13.9 g/dL      Hematocrit 39.2 %      MCV 81.2 fL      MCH 28.8 pg      MCHC 35.5 g/dL      RDW 12.7 %      RDW-SD 36.9 fl      MPV 9.4 fL      Platelets 526 10*3/mm3      Neutrophil % 69.4 %      Lymphocyte % 17.4 %      Monocyte % 7.5 %      Eosinophil % 0.4 %      Basophil % 0.2 %      Immature Grans % 5.1 %      Neutrophils, Absolute 9.13 10*3/mm3      Lymphocytes, Absolute 2.29 10*3/mm3      Monocytes, Absolute 0.98 10*3/mm3      Eosinophils, Absolute 0.05 10*3/mm3      Basophils, Absolute 0.03 10*3/mm3      Immature Grans, Absolute 0.67 10*3/mm3      nRBC 0.0 /100 WBC     SCANNED - LABS [[id number]] Resulted: 12/08/20      Updated: 12/09/20 2054    POC Glucose Once [[id number]]  (Abnormal) Collected: 12/09/20 1925    Specimen: Blood Updated: 12/09/20 2002     Glucose 347 mg/dL      Comment: RN NotifiedOperator: [id number] ALEJANDRO MICHAELAMeter ID: [id number]       POC Glucose Once [[id number]]  (Abnormal) Collected: 12/09/20 1719    Specimen: Blood Updated: 12/09/20 1757     Glucose 362 mg/dL      Comment: RN NotifiedOperator: [id number] BAKER DENISAMeter ID: [id number]       Blood Culture - Blood, Arm, Left [[id number]] Collected: 12/08/20 1713    Specimen: Blood from Arm, Left Updated: 12/09/20 1730     Blood Culture No growth at 24 hours    Blood Culture - Blood, Arm, Right [[id number]] Collected: 12/08/20 1443    Specimen: Blood from Arm, Right Updated: 12/09/20 1500     Blood Culture No growth  at 24 hours    POC Glucose Once [[id number]]  (Abnormal) Collected: 12/09/20 1123    Specimen: Blood Updated: 12/09/20 1209     Glucose 277 mg/dL      Comment: RN NotifiedOperator: [id number] EASTON Worthington ID: [id number]       CBC & Differential [[id number]]  (Abnormal) Collected: 12/09/20 0656    Specimen: Blood Updated: 12/09/20 0825    Narrative:      The following orders were created for panel order CBC & Differential.  Procedure                               Abnormality         Status                     ---------                               -----------         ------                     Scan Slide[[id number]]                                                                  CBC Auto Differential[[id number]]        Abnormal            Final result                 Please view results for these tests on the individual orders.    Fibrinogen [[id number]]  (Abnormal) Collected: 12/09/20 0656    Specimen: Blood Updated: 12/09/20 0823     Fibrinogen 734 mg/dL     D-dimer, Quantitative [[id number]]  (Abnormal) Collected: 12/09/20 0656    Specimen: Blood Updated: 12/09/20 0823     D-Dimer, Quantitative 945 ng/mL (FEU)     Narrative:      Dimer values <500 ng/ml FEU are FDA approved as aid in diagnosis of deep venous thrombosis and pulmonary embolism.  This test should not be used in an exclusion strategy with pretest probability alone.    A recent guideline regarding diagnosis for pulmonary thromboembolism recommends an adjusted exclusion criterion of age x 10 ng/ml FEU for patients >50 years of age ([employer] Intern Med 2015; 163: 701-711).      POC Glucose Once [[id number]]  (Abnormal) Collected: 12/09/20 0801    Specimen: Blood Updated: 12/09/20 0819     Glucose 325 mg/dL      Comment: RN NotifiedOperator: [id number] SHARON Richard ID: [id number]       Comprehensive Metabolic Panel [[id number]]  (Abnormal) Collected: 12/09/20 0656    Specimen: Blood Updated: 12/09/20 0812     Glucose 301 mg/dL      BUN 16 mg/dL         Creatinine 0.54 mg/dL      Sodium 130 mmol/L      Potassium 4.7 mmol/L      Chloride 97 mmol/L      CO2 21.0 mmol/L      Calcium 9.4 mg/dL      Total Protein 7.2 g/dL      Albumin 3.20 g/dL      ALT (SGPT) 22 U/L      AST (SGOT) 18 U/L      Alkaline Phosphatase 66 U/L      Total Bilirubin 0.7 mg/dL      eGFR Non African Amer >150 mL/min/1.73      Globulin 4.0 gm/dL      A/G Ratio 0.8 g/dL      BUN/Creatinine Ratio 29.6     Anion Gap 12.0 mmol/L     Narrative:      GFR Normal >60  Chronic Kidney Disease <60  Kidney Failure <15      CK [[id number]]  (Abnormal) Collected: 12/09/20 0656    Specimen: Blood Updated: 12/09/20 0812     Creatine Kinase 8 U/L     C-reactive Protein [[id number]]  (Abnormal) Collected: 12/09/20 0656    Specimen: Blood Updated: 12/09/20 0812     C-Reactive Protein 6.41 mg/dL     Lactate Dehydrogenase [[id number]]  (Abnormal) Collected: 12/09/20 0656    Specimen: Blood Updated: 12/09/20 0812      U/L     Bilirubin, Direct [[id number]]  (Normal) Collected: 12/09/20 0656    Specimen: Blood Updated: 12/09/20 0812     Bilirubin, Direct 0.3 mg/dL     Ferritin [[id number]]  (Abnormal) Collected: 12/09/20 0656    Specimen: Blood Updated: 12/09/20 0812     Ferritin 1,399.00 ng/mL     Narrative:      Results may be falsely decreased if patient taking Biotin.      CBC Auto Differential [[id number]]  (Abnormal) Collected: 12/09/20 0656    Specimen: Blood Updated: 12/09/20 0754     WBC 10.70 10*3/mm3      RBC 4.87 10*6/mm3      Hemoglobin 14.1 g/dL      Hematocrit 39.9 %      MCV 81.9 fL      MCH 29.0 pg      MCHC 35.3 g/dL      RDW 13.0 %      RDW-SD 38.1 fl      MPV 9.0 fL      Platelets 564 10*3/mm3      Neutrophil % 75.2 %      Lymphocyte % 11.0 %      Monocyte % 4.8 %      Eosinophil % 0.2 %      Basophil % 0.7 %      Immature Grans % 8.1 %      Neutrophils, Absolute 8.05 10*3/mm3      Lymphocytes, Absolute 1.18 10*3/mm3      Monocytes, Absolute 0.51 10*3/mm3      Eosinophils, Absolute 0.02  10*3/mm3      Basophils, Absolute 0.07 10*3/mm3      Immature Grans, Absolute 0.87 10*3/mm3      nRBC 0.0 /100 WBC     POC Glucose Once [[id number]]  (Abnormal) Collected: 12/08/20 1924    Specimen: Blood Updated: 12/09/20 0613     Glucose 290 mg/dL      Comment: RN NotifiedOperator: [id number] ALEJANDRO EBONIEAMeter ID: [id number]       COVID-19, [facility] IN-HOUSE, NP SWAB IN TRANSPORT MEDIA 8-10 HR TAT - Swab, Nasopharynx [[id number]]  (Abnormal) Collected: 12/08/20 1831    Specimen: Swab from Nasopharynx Updated: 12/08/20 2133     COVID19 Detected    Narrative:      Testing performed by Real Time RT-PCR  This test has not been approved by the [facility] but is authorized under the Emergency Use Act (EUA)    Fact sheet for providers: https://www.fda.gov/media/983951/download    Fact sheet for patients: https://www.fda.gov/media/273012/download        POC Glucose Once [[id number]]  (Abnormal) Collected: 12/08/20 1701    Specimen: Blood Updated: 12/08/20 1800     Glucose 225 mg/dL      Comment: RN NotifiedOperator: [id number] WAYULISA Harper ID: [id number]       Hepatic Function Panel [[id number]]  (Abnormal) Collected: 12/08/20 1351    Specimen: Blood Updated: 12/08/20 1754     Total Protein 7.7 g/dL      Albumin 3.30 g/dL      ALT (SGPT) 30 U/L      AST (SGOT) 24 U/L      Alkaline Phosphatase 73 U/L      Total Bilirubin 0.7 mg/dL      Bilirubin, Direct 0.2 mg/dL      Comment: Specimen hemolyzed. Results may be affected.        Bilirubin, Indirect 0.5 mg/dL     Procalcitonin [[id number]]  (Normal) Collected: 12/08/20 1713    Specimen: Blood Updated: 12/08/20 1752     Procalcitonin 0.08 ng/mL     Narrative:      As a Marker for Sepsis (Non-Neonates):   1. <0.5 ng/mL represents a low risk of severe sepsis and/or septic shock.  1. >2 ng/mL represents a high risk of severe sepsis and/or septic shock.    As a Marker for Lower Respiratory Tract Infections that require antibiotic therapy:  PCT on Admission     Antibiotic Therapy    "          6-12 Hrs later  > 0.5                Strongly Recommended            >0.25 - <0.5         Recommended  0.1 - 0.25           Discouraged                   Remeasure/reassess PCT  <0.1                 Strongly Discouraged          Remeasure/reassess PCT      As 28 day mortality risk marker: \"Change in Procalcitonin Result\" (> 80 % or <=80 %) if Day 0 (or Day 1) and Day 4 values are available. Refer to http://[email or web address]-pct-calculator.[email or web address]/   Change in PCT <=80 %   A decrease of PCT levels below or equal to 80 % defines a positive change in PCT test result representing a higher risk for 28-day all-cause mortality of patients diagnosed with severe sepsis or septic shock.  Change in PCT > 80 %   A decrease of PCT levels of more than 80 % defines a negative change in PCT result representing a lower risk for 28-day all-cause mortality of patients diagnosed with severe sepsis or septic shock.                Results may be falsely decreased if patient taking Biotin.     Lactate Dehydrogenase [[id number]]  (Abnormal) Collected: 12/08/20 1351    Specimen: Blood Updated: 12/08/20 1738      U/L      Comment: Specimen hemolyzed.  Results may be affected.       D-dimer, Quantitative [[id number]]  (Abnormal) Collected: 12/08/20 1351    Specimen: Blood Updated: 12/08/20 1732     D-Dimer, Quantitative 1,193 ng/mL (FEU)     Narrative:      Dimer values <500 ng/ml FEU are FDA approved as aid in diagnosis of deep venous thrombosis and pulmonary embolism.  This test should not be used in an exclusion strategy with pretest probability alone.    A recent guideline regarding diagnosis for pulmonary thromboembolism recommends an adjusted exclusion criterion of age x 10 ng/ml FEU for patients >50 years of age ([employer] Intern Med 2015; 163: 701-711).      Ferritin [[id number]]  (Abnormal) Collected: 12/08/20 1351    Specimen: Blood Updated: 12/08/20 1718     Ferritin 1,490.00 ng/mL     Narrative:      Results may be falsely decreased " if patient taking Biotin.      Creatinine, Serum [[id number]]  (Abnormal) Collected: 12/08/20 1351    Specimen: Blood Updated: 12/08/20 1712     Creatinine 0.70 mg/dL      eGFR Non African Amer 123 mL/min/1.73     Narrative:      GFR Normal >60  Chronic Kidney Disease <60  Kidney Failure <15      Manual Differential [[id number]]  (Abnormal) Collected: 12/08/20 1351    Specimen: Blood Updated: 12/08/20 1551     Neutrophil % 59.0 %      Lymphocyte % 12.0 %      Monocyte % 9.0 %      Eosinophil % 5.0 %      Bands %  4.0 %      Metamyelocyte % 6.0 %      Myelocyte % 4.0 %      Promyelocyte % 1.0 %      Neutrophils Absolute 9.51 10*3/mm3      Lymphocytes Absolute 1.81 10*3/mm3      Monocytes Absolute 1.36 10*3/mm3      Eosinophils Absolute 0.76 10*3/mm3      RBC Morphology Normal     WBC Morphology Normal     Platelet Morphology Normal    CBC & Differential [[id number]]  (Abnormal) Collected: 12/08/20 1351    Specimen: Blood Updated: 12/08/20 1546    Narrative:      The following orders were created for panel order CBC & Differential.  Procedure                               Abnormality         Status                     ---------                               -----------         ------                     Scan Slide[[id number]]                                                                  CBC Auto Differential[[id number]]        Abnormal            Final result                 Please view results for these tests on the individual orders.    Procalcitonin [[id number]]  (Normal) Collected: 12/08/20 1351    Specimen: Blood Updated: 12/08/20 1514     Procalcitonin 0.08 ng/mL     Narrative:      As a Marker for Sepsis (Non-Neonates):   1. <0.5 ng/mL represents a low risk of severe sepsis and/or septic shock.  1. >2 ng/mL represents a high risk of severe sepsis and/or septic shock.    As a Marker for Lower Respiratory Tract Infections that require antibiotic therapy:  PCT on Admission     Antibiotic Therapy             6-12 Hrs  "later  > 0.5                Strongly Recommended            >0.25 - <0.5         Recommended  0.1 - 0.25           Discouraged                   Remeasure/reassess PCT  <0.1                 Strongly Discouraged          Remeasure/reassess PCT      As 28 day mortality risk marker: \"Change in Procalcitonin Result\" (> 80 % or <=80 %) if Day 0 (or Day 1) and Day 4 values are available. Refer to http://www.[facility]-pct-calculator.com/   Change in PCT <=80 %   A decrease of PCT levels below or equal to 80 % defines a positive change in PCT test result representing a higher risk for 28-day all-cause mortality of patients diagnosed with severe sepsis or septic shock.  Change in PCT > 80 %   A decrease of PCT levels of more than 80 % defines a negative change in PCT result representing a lower risk for 28-day all-cause mortality of patients diagnosed with severe sepsis or septic shock.                Results may be falsely decreased if patient taking Biotin.     [place] Draw [[id number]] Collected: 12/08/20 1351    Specimen: Blood Updated: 12/08/20 1500    Narrative:      The following orders were created for panel order [place] Draw.  Procedure                               Abnormality         Status                     ---------                               -----------         ------                     Light Blue Top[[id number]]                                   Final result               Green Top (Gel)[[id number]]                                  Final result               Lavender Top[[id number]]                                     Final result               Gold Top - SST[[id number]]                                   Final result                 Please view results for these tests on the individual orders.    Light Blue Top [[id number]] Collected: 12/08/20 1351    Specimen: Blood Updated: 12/08/20 1500     Extra Tube hold for add-on     Comment: Auto resulted       Green Top (Gel) [[id number]] Collected: 12/08/20 1351    " Specimen: Blood Updated: 12/08/20 1500     Extra Tube Hold for add-ons.     Comment: Auto resulted.       Lavender Top [[id number]] Collected: 12/08/20 1351    Specimen: Blood Updated: 12/08/20 1500     Extra Tube hold for add-on     Comment: Auto resulted       Gold Top - SST [[id number]] Collected: 12/08/20 1351    Specimen: Blood Updated: 12/08/20 1500     Extra Tube Hold for add-ons.     Comment: Auto resulted.       Comprehensive Metabolic Panel [[id number]]  (Abnormal) Collected: 12/08/20 1351    Specimen: Blood Updated: 12/08/20 1445     Glucose 229 mg/dL      BUN 19 mg/dL      Creatinine 0.64 mg/dL      Sodium 132 mmol/L      Potassium 4.2 mmol/L      Comment: Slight hemolysis detected by analyzer. Results may be affected.        Chloride 96 mmol/L      CO2 21.0 mmol/L      Calcium 9.0 mg/dL      Total Protein 7.8 g/dL      Albumin 3.30 g/dL      ALT (SGPT) 29 U/L      AST (SGOT) 25 U/L      Alkaline Phosphatase 73 U/L      Total Bilirubin 0.7 mg/dL      eGFR Non African Amer 136 mL/min/1.73      Globulin 4.5 gm/dL      A/G Ratio 0.7 g/dL      BUN/Creatinine Ratio 29.7     Anion Gap 15.0 mmol/L     Narrative:      GFR Normal >60  Chronic Kidney Disease <60  Kidney Failure <15      Troponin [[id number]]  (Normal) Collected: 12/08/20 1351    Specimen: Blood Updated: 12/08/20 1422     Troponin T <0.010 ng/mL     Narrative:      Troponin T Reference Range:  <= 0.03 ng/mL-   Negative for AMI  >0.03 ng/mL-     Abnormal for myocardial necrosis.  Clinicians would have to utilize clinical acumen, EKG, Troponin and serial changes to determine if it is an Acute Myocardial Infarction or myocardial injury due to an underlying chronic condition.       Results may be falsely decreased if patient taking Biotin.      BNP [[id number]]  (Normal) Collected: 12/08/20 1351    Specimen: Blood Updated: 12/08/20 1420     proBNP 35.5 pg/mL     Narrative:      Among patients with dyspnea, NT-proBNP is highly sensitive for the detection of  acute congestive heart failure. In addition NT-proBNP of <300 pg/ml effectively rules out acute congestive heart failure with 99% negative predictive value.    Results may be falsely decreased if patient taking Biotin.      Lactic Acid, Plasma [[id number]]  (Normal) Collected: 12/08/20 1351    Specimen: Blood Updated: 12/08/20 1419     Lactate 1.8 mmol/L     CBC Auto Differential [[id number]]  (Abnormal) Collected: 12/08/20 1351    Specimen: Blood Updated: 12/08/20 1408     WBC 15.10 10*3/mm3      RBC 5.40 10*6/mm3      Hemoglobin 15.4 g/dL      Hematocrit 44.6 %      MCV 82.6 fL      MCH 28.5 pg      MCHC 34.5 g/dL      RDW 12.9 %      RDW-SD 38.5 fl      MPV 8.8 fL      Platelets 542 10*3/mm3      Neutrophil % 67.5 %      Lymphocyte % 15.4 %      Monocyte % 7.8 %      Eosinophil % 1.1 %      Basophil % 0.1 %      Immature Grans % 8.1 %      Neutrophils, Absolute 10.18 10*3/mm3      Lymphocytes, Absolute 2.33 10*3/mm3      Monocytes, Absolute 1.18 10*3/mm3      Eosinophils, Absolute 0.17 10*3/mm3      Basophils, Absolute 0.01 10*3/mm3      Immature Grans, Absolute 1.23 10*3/mm3      nRBC 0.0 /100 WBC           Imaging Results (Last 72 Hours)     Procedure Component Value Units Date/Time    XR Chest 1 View [[id number]] Collected: 12/08/20 1401     Updated: 12/08/20 1426    Narrative:      Chest x-ray single view.       CLINICAL INDICATION: Shortness of breath. Triage protocol. Covid  +    COMPARISON: March 29, 2017.    FINDINGS: Cardiac silhouette is normal in size. Pulmonary  vascularity is unremarkable.     Bilateral somewhat peripheral appearing infiltrative changes  strongly suggesting Covid pneumonitis.      Impression:      Bilateral somewhat peripheral appearing infiltrative  changes, pneumonitis. The distribution  suggests COVID   pneumonitis.    Electronically signed by:  Carlos Alberto Aguiar MD  12/8/2020 2:25 PM [facility]  Workstation: [id number]        ECG/EMG Results (last 72 hours)     Procedure Component Value Units  Date/Time    ECG 12 Lead [[id number]] Collected: 12/08/20 1350     Updated: 12/09/20 1247     QT Interval 366 ms      QTC Interval 452 ms     Narrative:      Test Reason : SOA  Blood Pressure : **/** mmHG  Vent. Rate : 092 BPM     Atrial Rate : 092 BPM     P-R Int : 124 ms          QRS Dur : 086 ms      QT Int : 366 ms       P-R-T Axes : 034 057 062 degrees     QTc Int : 452 ms    Normal sinus rhythm  Normal ECG  When compared with ECG of 30-MAR-2017 06:23,  Vent. rate has increased BY  32 BPM  ST no longer elevated in Lateral leads  Confirmed by FRANCA GROSS ([id number]) on 12/9/2020 12:46:44 PM    Referred By:  ERDR           Confirmed By:FRANCA GROSS    SCANNED EKG [[id number]] Resulted: 12/08/20      Updated: 12/09/20 1454             Physician Progress Notes (last 72 hours) (Notes from 12/07/20 0954 through 12/10/20 0954)      Concepción Yepez APRN at 12/09/20 1434     Attestation signed by Teodoro Zimmerman MD at 12/09/20 1822    I personally evaluated and examined the patient in conjunction with DAMIAN Cabrera and agree with the assessment, treatment plan, and disposition of the patient as recorded.  Cardiovascular: Normal S1 and S2.  Lungs: Distant breath sounds bilaterally.                         [facility] Medicine Services  INPATIENT PROGRESS NOTE    Length of Stay: 1  Date of Admission: 12/8/2020  Primary Care Physician: Efrem Deng MD    Subjective   Chief Complaint:     HPI:      12/9/20: The patient has been increased to 4 L of oxygen today.  Progression labs have improved today.  Patient states he has intermittent tightness in his chest.    H&P by Dr. Zimmerman:  Patient is a 45-year-old male with past medical history notable for diabetes mellitus, hyperlipidemia, hypertension, and chronic pain.  Patient presented to the emergency department due to worsening dyspnea.  Per patient report he had tested positive for COVID-19 at outside facility prior to arrival  today.  He states that he first tested positive for Thanksgiving and his wife was positive prior to that.  He notes he has been having worsening shortness of breath with exertion the last 2 to 3 days now.  He notes that he had cough initially but this seems improved today.  He also had some initial nausea and vomiting but this is also better.  He also noted some difficulties with headache but that also seems somewhat improved.  He denies any body aches.  He also notes that he has had issues with his sense of taste and smell.     Patient was noted to be hypoxic on room air to 88% so he was placed on supplemental oxygen in the ED.  Chest x-ray was read as concerning for bilateral infiltrative changes consistent with Covid pneumonitis.  CBC showed a WBC of 15.1 with normal hemoglobin and hematocrit.  He did have a thrombosed cytosis with platelets of 2/5/1941.  Lactate, troponin T, and proBNP were all normal.  CMP shows a glucose of 229, creatinine 1.64, sodium 132, chloride 96, CO2 21, albumin 3.3.  Blood cultures were collected in the emergency department.    Review of Systems   Constitutional: Positive for fatigue. Negative for activity change.   HENT: Negative for ear pain and sore throat.    Eyes: Negative for pain and discharge.   Respiratory: Positive for cough and shortness of breath.    Cardiovascular: Negative for chest pain and palpitations.   Gastrointestinal: Negative for abdominal pain and nausea.   Endocrine: Negative for cold intolerance and heat intolerance.   Genitourinary: Negative for difficulty urinating and dysuria.   Musculoskeletal: Negative for arthralgias and gait problem.   Skin: Negative for color change and rash.   Neurological: Negative for dizziness and weakness.   Psychiatric/Behavioral: Negative for agitation and confusion.        Objective    Temp:  [96 °F (35.6 °C)-97.5 °F (36.4 °C)] 96 °F (35.6 °C)  Heart Rate:  [60-96] 71  Resp:  [18-20] 18  BP: (116-141)/(64-87) 120/76    Physical  Exam  Constitutional:       Appearance: He is well-developed.   HENT:      Head: Normocephalic and atraumatic.   Eyes:      Pupils: Pupils are equal, round, and reactive to light.   Neck:      Musculoskeletal: Normal range of motion and neck supple.   Cardiovascular:      Rate and Rhythm: Normal rate and regular rhythm.   Pulmonary:      Effort: Pulmonary effort is normal.      Breath sounds: Normal breath sounds.   Abdominal:      General: Bowel sounds are normal.      Palpations: Abdomen is soft.   Musculoskeletal: Normal range of motion.   Skin:     General: Skin is warm and dry.   Neurological:      Mental Status: He is alert and oriented to person, place, and time.   Psychiatric:         Behavior: Behavior normal.       Results Review:  I have reviewed the labs, radiology results, and diagnostic studies.    Laboratory Data:   Results from last 7 days   Lab Units 12/09/20  0656 12/08/20  1351   SODIUM mmol/L 130* 132*   POTASSIUM mmol/L 4.7 4.2   CHLORIDE mmol/L 97* 96*   CO2 mmol/L 21.0* 21.0*   BUN mg/dL 16 19   CREATININE mg/dL 0.54* 0.70*  0.64*   GLUCOSE mg/dL 301* 229*   CALCIUM mg/dL 9.4 9.0   BILIRUBIN mg/dL 0.7 0.7  0.7   ALK PHOS U/L 66 73  73   ALT (SGPT) U/L 22 30  29   AST (SGOT) U/L 18 24  25   ANION GAP mmol/L 12.0 15.0     Estimated Creatinine Clearance: 216.8 mL/min (A) (by C-G formula based on SCr of 0.54 mg/dL (L)).          Results from last 7 days   Lab Units 12/09/20  0656 12/08/20  1351   WBC 10*3/mm3 10.70 15.10*   HEMOGLOBIN g/dL 14.1 15.4   HEMATOCRIT % 39.9 44.6   PLATELETS 10*3/mm3 564* 542*           Culture Data:   No results found for: BLOODCX  No results found for: URINECX  No results found for: RESPCX  No results found for: WOUNDCX  No results found for: STOOLCX  No components found for: BODYFLD    Radiology Data:   Imaging Results (Last 24 Hours)     ** No results found for the last 24 hours. **          I have reviewed the patient's current medications.     Assessment/Plan      Active Hospital Problems    Diagnosis POA   • Acute respiratory failure with hypoxia (CMS/[facility]) [J96.01] Unknown   • Pneumonia due to COVID-19 virus [U07.1, J12.89] Unknown   • Essential hypertension [I10] Yes   • Type 2 diabetes mellitus with hyperglycemia, with long-term current use of insulin (CMS/[facility]) [E11.65, Z79.4] Not Applicable       Plan:    1 .  Acute respiratory failure with hypoxia: Patient is currently on 4 L of oxygen.  We will continue to monitor closely and wean if possible.    2.  Pneumonia secondary to COVID-19 virus: Continue Decadron 6 mg daily, remdesivir, prophylactic antibiotics and progression labs.  Continue bronchodilators and scheduled albuterol.  Blood cultures negative at 24 hours.  3.  Hypertension: Continue home losartan.  4.  Hyperlipidemia: Continue home statin.  5.  Diabetes mellitus, type II: Continue SSI.        DVT prophylaxis with Lovenox twice daily per current standards and guidelines  CODE STATUS: Full    Discharge Planning: I expect patient to be discharged to home in 3-4 days.      This document has been electronically signed by DAMIAN Lobato on December 9, 2020 14:34 CST          Electronically signed by Teodoro Zimmerman MD at 12/09/20 1662       Consult Notes (last 72 hours) (Notes from 12/07/20 0954 through 12/10/20 0954)    No notes of this type exist for this encounter.

## 2020-12-11 LAB
ALBUMIN SERPL-MCNC: 3 G/DL (ref 3.5–5.2)
ALBUMIN/GLOB SERPL: 0.8 G/DL
ALP SERPL-CCNC: 91 U/L (ref 39–117)
ALT SERPL W P-5'-P-CCNC: 23 U/L (ref 1–41)
ANION GAP SERPL CALCULATED.3IONS-SCNC: 10 MMOL/L (ref 5–15)
AST SERPL-CCNC: 14 U/L (ref 1–40)
BASOPHILS # BLD AUTO: 0.04 10*3/MM3 (ref 0–0.2)
BASOPHILS NFR BLD AUTO: 0.3 % (ref 0–1.5)
BILIRUB CONJ SERPL-MCNC: 0.2 MG/DL (ref 0–0.3)
BILIRUB SERPL-MCNC: 0.5 MG/DL (ref 0–1.2)
BUN SERPL-MCNC: 19 MG/DL (ref 6–20)
BUN/CREAT SERPL: 30.6 (ref 7–25)
CALCIUM SPEC-SCNC: 8.8 MG/DL (ref 8.6–10.5)
CHLORIDE SERPL-SCNC: 94 MMOL/L (ref 98–107)
CK SERPL-CCNC: 11 U/L (ref 20–200)
CO2 SERPL-SCNC: 21 MMOL/L (ref 22–29)
CREAT SERPL-MCNC: 0.62 MG/DL (ref 0.76–1.27)
CRP SERPL-MCNC: 0.69 MG/DL (ref 0–0.5)
D-DIMER, QUANTITATIVE (MAD,POW, STR): 777 NG/ML (FEU) (ref 0–470)
DEPRECATED RDW RBC AUTO: 38.9 FL (ref 37–54)
EOSINOPHIL # BLD AUTO: 0.05 10*3/MM3 (ref 0–0.4)
EOSINOPHIL NFR BLD AUTO: 0.4 % (ref 0.3–6.2)
ERYTHROCYTE [DISTWIDTH] IN BLOOD BY AUTOMATED COUNT: 13.1 % (ref 12.3–15.4)
FERRITIN SERPL-MCNC: 1308 NG/ML (ref 30–400)
FIBRINOGEN PPP-MCNC: 506 MG/DL (ref 228–514)
GFR SERPL CREATININE-BSD FRML MDRD: 141 ML/MIN/1.73
GLOBULIN UR ELPH-MCNC: 3.8 GM/DL
GLUCOSE BLDC GLUCOMTR-MCNC: 239 MG/DL (ref 70–130)
GLUCOSE BLDC GLUCOMTR-MCNC: 370 MG/DL (ref 70–130)
GLUCOSE SERPL-MCNC: 366 MG/DL (ref 65–99)
GLUCOSE SERPL-MCNC: 381 MG/DL (ref 65–99)
HBA1C MFR BLD: 8.8 % (ref 4.8–5.6)
HCT VFR BLD AUTO: 40.4 % (ref 37.5–51)
HGB BLD-MCNC: 14 G/DL (ref 13–17.7)
IMM GRANULOCYTES # BLD AUTO: 0.49 10*3/MM3 (ref 0–0.05)
IMM GRANULOCYTES NFR BLD AUTO: 3.9 % (ref 0–0.5)
LDH SERPL-CCNC: 211 U/L (ref 135–225)
LYMPHOCYTES # BLD AUTO: 2.47 10*3/MM3 (ref 0.7–3.1)
LYMPHOCYTES NFR BLD AUTO: 19.7 % (ref 19.6–45.3)
MCH RBC QN AUTO: 28.6 PG (ref 26.6–33)
MCHC RBC AUTO-ENTMCNC: 34.7 G/DL (ref 31.5–35.7)
MCV RBC AUTO: 82.6 FL (ref 79–97)
MONOCYTES # BLD AUTO: 0.93 10*3/MM3 (ref 0.1–0.9)
MONOCYTES NFR BLD AUTO: 7.4 % (ref 5–12)
NEUTROPHILS NFR BLD AUTO: 68.3 % (ref 42.7–76)
NEUTROPHILS NFR BLD AUTO: 8.53 10*3/MM3 (ref 1.7–7)
NRBC BLD AUTO-RTO: 0 /100 WBC (ref 0–0.2)
PLATELET # BLD AUTO: 552 10*3/MM3 (ref 140–450)
PMV BLD AUTO: 9.4 FL (ref 6–12)
POTASSIUM SERPL-SCNC: 4 MMOL/L (ref 3.5–5.2)
PROCALCITONIN SERPL-MCNC: 0.03 NG/ML (ref 0–0.25)
PROT SERPL-MCNC: 6.8 G/DL (ref 6–8.5)
RBC # BLD AUTO: 4.89 10*6/MM3 (ref 4.14–5.8)
SODIUM SERPL-SCNC: 125 MMOL/L (ref 136–145)
WBC # BLD AUTO: 12.51 10*3/MM3 (ref 3.4–10.8)

## 2020-12-11 PROCEDURE — 85379 FIBRIN DEGRADATION QUANT: CPT | Performed by: FAMILY MEDICINE

## 2020-12-11 PROCEDURE — 82947 ASSAY GLUCOSE BLOOD QUANT: CPT | Performed by: FAMILY MEDICINE

## 2020-12-11 PROCEDURE — 85384 FIBRINOGEN ACTIVITY: CPT | Performed by: FAMILY MEDICINE

## 2020-12-11 PROCEDURE — 84145 PROCALCITONIN (PCT): CPT | Performed by: INTERNAL MEDICINE

## 2020-12-11 PROCEDURE — 85025 COMPLETE CBC W/AUTO DIFF WBC: CPT | Performed by: FAMILY MEDICINE

## 2020-12-11 PROCEDURE — 25010000002 ENOXAPARIN PER 10 MG: Performed by: FAMILY MEDICINE

## 2020-12-11 PROCEDURE — 82248 BILIRUBIN DIRECT: CPT | Performed by: FAMILY MEDICINE

## 2020-12-11 PROCEDURE — 83615 LACTATE (LD) (LDH) ENZYME: CPT | Performed by: FAMILY MEDICINE

## 2020-12-11 PROCEDURE — 80053 COMPREHEN METABOLIC PANEL: CPT | Performed by: FAMILY MEDICINE

## 2020-12-11 PROCEDURE — 86140 C-REACTIVE PROTEIN: CPT | Performed by: FAMILY MEDICINE

## 2020-12-11 PROCEDURE — 82550 ASSAY OF CK (CPK): CPT | Performed by: FAMILY MEDICINE

## 2020-12-11 PROCEDURE — 63710000001 INSULIN ASPART PER 5 UNITS: Performed by: INTERNAL MEDICINE

## 2020-12-11 PROCEDURE — 82962 GLUCOSE BLOOD TEST: CPT

## 2020-12-11 PROCEDURE — 25010000002 DEXAMETHASONE PER 1 MG: Performed by: FAMILY MEDICINE

## 2020-12-11 PROCEDURE — 83036 HEMOGLOBIN GLYCOSYLATED A1C: CPT | Performed by: INTERNAL MEDICINE

## 2020-12-11 PROCEDURE — 63710000001 INSULIN DETEMIR PER 5 UNITS: Performed by: FAMILY MEDICINE

## 2020-12-11 PROCEDURE — 63710000001 INSULIN ASPART PER 5 UNITS: Performed by: FAMILY MEDICINE

## 2020-12-11 PROCEDURE — 82728 ASSAY OF FERRITIN: CPT | Performed by: FAMILY MEDICINE

## 2020-12-11 PROCEDURE — 94799 UNLISTED PULMONARY SVC/PX: CPT

## 2020-12-11 RX ORDER — ASCORBIC ACID 500 MG
4000 TABLET ORAL EVERY 4 HOURS
Status: DISCONTINUED | OUTPATIENT
Start: 2020-12-11 | End: 2020-12-13 | Stop reason: HOSPADM

## 2020-12-11 RX ADMIN — ENOXAPARIN SODIUM 40 MG: 40 INJECTION SUBCUTANEOUS at 21:24

## 2020-12-11 RX ADMIN — ZINC SULFATE 220 MG (50 MG) CAPSULE 220 MG: CAPSULE at 07:52

## 2020-12-11 RX ADMIN — ALBUTEROL SULFATE 2 PUFF: 90 AEROSOL, METERED RESPIRATORY (INHALATION) at 19:59

## 2020-12-11 RX ADMIN — OXYCODONE HYDROCHLORIDE AND ACETAMINOPHEN 1 TABLET: 10; 325 TABLET ORAL at 11:40

## 2020-12-11 RX ADMIN — GABAPENTIN 800 MG: 400 CAPSULE ORAL at 21:26

## 2020-12-11 RX ADMIN — INSULIN ASPART 14 UNITS: 100 INJECTION, SOLUTION INTRAVENOUS; SUBCUTANEOUS at 17:28

## 2020-12-11 RX ADMIN — INSULIN ASPART 5 UNITS: 100 INJECTION, SOLUTION INTRAVENOUS; SUBCUTANEOUS at 11:40

## 2020-12-11 RX ADMIN — OXYCODONE HYDROCHLORIDE AND ACETAMINOPHEN 3000 MG: 500 TABLET ORAL at 11:41

## 2020-12-11 RX ADMIN — ALBUTEROL SULFATE 2 PUFF: 90 AEROSOL, METERED RESPIRATORY (INHALATION) at 16:02

## 2020-12-11 RX ADMIN — REMDESIVIR 100 MG: 100 INJECTION, POWDER, LYOPHILIZED, FOR SOLUTION INTRAVENOUS at 17:28

## 2020-12-11 RX ADMIN — INSULIN ASPART 5 UNITS: 100 INJECTION, SOLUTION INTRAVENOUS; SUBCUTANEOUS at 07:50

## 2020-12-11 RX ADMIN — OXYCODONE HYDROCHLORIDE AND ACETAMINOPHEN 4000 MG: 500 TABLET ORAL at 21:25

## 2020-12-11 RX ADMIN — LOSARTAN POTASSIUM 50 MG: 50 TABLET, FILM COATED ORAL at 21:26

## 2020-12-11 RX ADMIN — ZINC SULFATE 220 MG (50 MG) CAPSULE 220 MG: CAPSULE at 21:26

## 2020-12-11 RX ADMIN — DOCUSATE SODIUM 100 MG: 100 CAPSULE, LIQUID FILLED ORAL at 07:53

## 2020-12-11 RX ADMIN — OXYCODONE HYDROCHLORIDE AND ACETAMINOPHEN 3000 MG: 500 TABLET ORAL at 07:51

## 2020-12-11 RX ADMIN — SODIUM CHLORIDE, PRESERVATIVE FREE 10 ML: 5 INJECTION INTRAVENOUS at 21:26

## 2020-12-11 RX ADMIN — OXYCODONE HYDROCHLORIDE AND ACETAMINOPHEN 1 TABLET: 10; 325 TABLET ORAL at 21:31

## 2020-12-11 RX ADMIN — GABAPENTIN 800 MG: 400 CAPSULE ORAL at 11:41

## 2020-12-11 RX ADMIN — ALBUTEROL SULFATE 2 PUFF: 90 AEROSOL, METERED RESPIRATORY (INHALATION) at 08:09

## 2020-12-11 RX ADMIN — SODIUM CHLORIDE, PRESERVATIVE FREE 10 ML: 5 INJECTION INTRAVENOUS at 07:52

## 2020-12-11 RX ADMIN — DOCUSATE SODIUM 100 MG: 100 CAPSULE, LIQUID FILLED ORAL at 21:26

## 2020-12-11 RX ADMIN — MELATONIN 9 MG: at 21:25

## 2020-12-11 RX ADMIN — GABAPENTIN 800 MG: 400 CAPSULE ORAL at 04:36

## 2020-12-11 RX ADMIN — OXYCODONE HYDROCHLORIDE AND ACETAMINOPHEN 3000 MG: 500 TABLET ORAL at 01:03

## 2020-12-11 RX ADMIN — OXYCODONE HYDROCHLORIDE AND ACETAMINOPHEN 1 TABLET: 10; 325 TABLET ORAL at 04:46

## 2020-12-11 RX ADMIN — DEXAMETHASONE SODIUM PHOSPHATE 6 MG: 4 INJECTION, SOLUTION INTRAMUSCULAR; INTRAVENOUS at 07:52

## 2020-12-11 RX ADMIN — SERTRALINE HYDROCHLORIDE 100 MG: 50 TABLET ORAL at 07:52

## 2020-12-11 RX ADMIN — Medication 10000 UNITS: at 07:51

## 2020-12-11 RX ADMIN — INSULIN DETEMIR 20 UNITS: 100 INJECTION, SOLUTION SUBCUTANEOUS at 21:24

## 2020-12-11 RX ADMIN — OXYCODONE HYDROCHLORIDE AND ACETAMINOPHEN 3000 MG: 500 TABLET ORAL at 04:36

## 2020-12-11 RX ADMIN — OXYCODONE HYDROCHLORIDE AND ACETAMINOPHEN 1 TABLET: 10; 325 TABLET ORAL at 17:34

## 2020-12-11 RX ADMIN — ATORVASTATIN CALCIUM 40 MG: 40 TABLET, FILM COATED ORAL at 21:25

## 2020-12-11 RX ADMIN — OXYCODONE HYDROCHLORIDE AND ACETAMINOPHEN 3000 MG: 500 TABLET ORAL at 16:01

## 2020-12-11 RX ADMIN — ENOXAPARIN SODIUM 40 MG: 40 INJECTION SUBCUTANEOUS at 04:36

## 2020-12-11 NOTE — PLAN OF CARE
Goal Outcome Evaluation:  Plan of Care Reviewed With: patient  Progress: improving  Outcome Summary: vss, pt desaturated on RA at breakfast, NC 2L reapplied and vss since. resting well. No distress.

## 2020-12-11 NOTE — PAYOR COMM NOTE
"Tyra Choudhury, [facility]  [phone]    Phone  [phone]     Fax  Cont stay review    Trevor Harkins (45 y.o. Male)     Date of Birth Social Security Number Address Home Phone MRN    [id number]  [address] [place] [address] [phone] [id number]    [employer] Marital Status          Hindu        Admission Date Admission Type Admitting Provider Attending Provider Department, Room/Bed    12/8/20 Emergency Sumeet Zimmerman MD Craig, David A, MD [address], 427/1    Discharge Date Discharge Disposition Discharge Destination                       Attending Provider: Sumeet Zimmerman MD    Allergies: Ambien [Zolpidem Tartrate]    Isolation: Enh Drop/Con   Infection: COVID (confirmed) (12/08/20)   Code Status: CPR    Ht: 177.8 cm (70\")   Wt: 111 kg (245 lb 9.6 oz)    Admission Cmt: None   Principal Problem: None                Active Insurance as of 12/8/2020     Primary Coverage     Payor Plan Insurance Group Employer/Plan Group    [employer] PPO [id number]     Payor Plan Address Payor Plan Phone Number Payor Plan Fax Number Effective Dates    [phone]  6/1/2020 - None Entered    [place] [id number]       Subscriber Name Subscriber Birth Date Member ID       TREVOR HARKINS 1975 [id number]                 Emergency Contacts      (Rel.) Home Phone Work Phone Mobile Phone    Verónica Harkins (Spouse) [phone]            Vital Signs (last day)     Date/Time   Temp   Temp src   Pulse   Resp   BP   Patient Position   SpO2    12/11/20 0809   --   --   60   22   --   --   96    12/11/20 0748   --   --   --   --   134/77   --   93    12/11/20 0747   --   --   66   19   --   --   (!) 87    12/11/20 0715   --   --   66   --   --   --   --    12/11/20 0300   --   Oral   64   18   135/82   Lying   91    12/11/20 0257   --   --   75   --   --   --   " --    12/10/20 2010   97.3 (36.3)   Oral   86   18   154/93   Lying   96    12/10/20 1923   --   --   103   --   --   --   --    12/10/20 1602   97 (36.1)   Oral   72   16   128/95   Lying   97    12/10/20 1524   --   --   79   16   --   --   96    12/10/20 1048   --   --   78   16   --   --   97    12/10/20 0905   --   --   --   --   110/70   Lying   --    12/10/20 0900   --   --   81   --   --   --   --    12/10/20 0833   96.5 (35.8)   Oral   72   16   97/74   Lying   97    12/10/20 0803   --   --   69   16   --   --   90    12/10/20 0421   --   --   60   --   --   --   --    12/10/20 0406   97 (36.1)   Temporal   70   18   120/64   Lying   95              Current Facility-Administered Medications   Medication Dose Route Frequency Provider Last Rate Last Admin   • acetaminophen (TYLENOL) tablet 650 mg  650 mg Oral Q4H PRN Sumeet Zimmerman MD        Or   • acetaminophen (TYLENOL) suppository 650 mg  650 mg Rectal Q4H PRN Sumeet Zimmerman MD       • albuterol sulfate HFA (PROVENTIL HFA;VENTOLIN HFA;PROAIR HFA) inhaler 2 puff  2 puff Inhalation 4x Daily - RT Sumeet Zimmerman MD   2 puff at 12/11/20 0809   • atorvastatin (LIPITOR) tablet 40 mg  40 mg Oral Nightly Sumeet Zimmerman MD   40 mg at 12/10/20 2016   • benzonatate (TESSALON) capsule 200 mg  200 mg Oral TID PRN Sumeet Zimmerman MD       • bisacodyl (DULCOLAX) suppository 10 mg  10 mg Rectal Daily PRN Sumeet Zimmerman MD       • calcium carbonate (TUMS) chewable tablet 500 mg (200 mg elemental)  2 tablet Oral BID PRN Sumeet Zimmerman MD       • cholecalciferol (VITAMIN D3) tablet 10,000 Units  10,000 Units Oral Daily Leandro Gonzalez MD   10,000 Units at 12/11/20 0751   • dexamethasone (DECADRON) tablet 6 mg  6 mg Oral Daily With Breakfast Sumeet Zimmerman MD   6 mg at 12/10/20 0902    Or   • dexamethasone (DECADRON) injection 6 mg  6 mg Intravenous Daily With Breakfast Sumeet Zimmerman MD   6 mg at 12/11/20 0752   • dextrose (D50W) 25 g/ 50mL Intravenous Solution  25 g  25 g Intravenous Q15 Min PRN Sumeet Zimmerman MD       • dextrose (GLUTOSE) oral gel 15 g  15 g Oral Q15 Min PRN Sumeet Zimmerman MD       • docusate sodium (COLACE) capsule 100 mg  100 mg Oral BID Sumeet Zimmerman MD   100 mg at 12/11/20 0753   • enoxaparin (LOVENOX) syringe 40 mg  40 mg Subcutaneous Q12H Sumeet Zimmerman MD   40 mg at 12/11/20 0436   • gabapentin (NEURONTIN) capsule 800 mg  800 mg Oral Q8H Sumeet Zimmerman MD   800 mg at 12/11/20 0436   • glucagon (human recombinant) (GLUCAGEN DIAGNOSTIC) injection 1 mg  1 mg Subcutaneous Q15 Min PRN Sumeet Zimmerman MD       • insulin aspart (novoLOG) injection 0-7 Units  0-7 Units Subcutaneous TID AC Sumeet Zimmerman MD   5 Units at 12/11/20 0750   • insulin detemir (LEVEMIR) injection 20 Units  20 Units Subcutaneous Nightly Sumeet Zimmerman MD   20 Units at 12/10/20 2020   • losartan (COZAAR) tablet 50 mg  50 mg Oral Nightly Sumeet Zimmerman MD   50 mg at 12/10/20 2015   • melatonin tablet 9 mg  9 mg Oral Nightly Leandro Gonzalez MD   9 mg at 12/10/20 2016   • ondansetron (ZOFRAN) tablet 4 mg  4 mg Oral Q6H PRN Sumeet Zimmerman MD        Or   • ondansetron (ZOFRAN) injection 4 mg  4 mg Intravenous Q6H PRN Sumeet Zimmerman MD       • oxyCODONE-acetaminophen (PERCOCET)  MG per tablet 1 tablet  1 tablet Oral Q4H PRN Concepción Yepez APRN   1 tablet at 12/11/20 0446   • Pharmacy Consult - Remdesivir   Does not apply Continuous PRN Sumeet Zimmerman MD       • remdesivir 100 mg in sodium chloride 0.9 % 250 mL IVPB (powder vial)  100 mg Intravenous Q24H Sumeet Zimmerman MD   Stopped at 12/10/20 2022   • sertraline (ZOLOFT) tablet 100 mg  100 mg Oral Daily Sumeet Zimmerman MD   100 mg at 12/11/20 0752   • sodium chloride 0.9 % flush 10 mL  10 mL Intravenous PRN Trevor Nelson MD       • sodium chloride 0.9 % flush 10 mL  10 mL Intravenous Q12H Sumeet Zimmerman MD   10 mL at 12/11/20 0752   • sodium chloride 0.9 % flush 10 mL  10 mL Intravenous PRN Sumeet Zimmerman  MD DUYEN       • vitamin C (ASCORBIC ACID) tablet 3,000 mg  3,000 mg Oral Q4H Leandro Gonzalez MD   3,000 mg at 12/11/20 0751   • zinc sulfate (ZINCATE) capsule 220 mg  220 mg Oral BID Leandro Gonzalez MD   220 mg at 12/11/20 0752        Physician Progress Notes (last 48 hours) (Notes from 12/09/20 1016 through 12/11/20 1016)      Leandro Gonzalez MD at 12/10/20 1457              [facility] Medicine Services  INPATIENT PROGRESS NOTE    Length of Stay: 2  Date of Admission: 12/8/2020  Primary Care Physician: Efrem Deng MD    Subjective   Chief Complaint: shortness of breath with minimal effort    H&P by Dr. Zimmerman:  Patient is a 45-year-old male with past medical history notable for diabetes mellitus, hyperlipidemia, hypertension, and chronic pain.  Patient presented to the emergency department due to worsening dyspnea.  Per patient report he had tested positive for COVID-19 at outside facility prior to arrival today.  He states that he first tested positive for Thanksgiving and his wife was positive prior to that.  He notes he has been having worsening shortness of breath with exertion the last 2 to 3 days now.  He notes that he had cough initially but this seems improved today.  He also had some initial nausea and vomiting but this is also better.  He also noted some difficulties with headache but that also seems somewhat improved.  He denies any body aches.  He also notes that he has had issues with his sense of taste and smell.     Patient was noted to be hypoxic on room air to 88% so he was placed on supplemental oxygen in the ED.  Chest x-ray was read as concerning for bilateral infiltrative changes consistent with Covid pneumonitis.  CBC showed a WBC of 15.1 with normal hemoglobin and hematocrit.  He did have a thrombosed cytosis with platelets of 2/5/1942.  Lactate, troponin T, and proBNP were all normal.  CMP shows a glucose of 229, creatinine 1.64,  sodium 132, chloride 96, CO2 21, albumin 3.3.  Blood cultures were collected in the emergency department.    Review of Systems   Constitutional: Positive for fatigue. Negative for activity change.   HENT: Negative for ear pain and sore throat.    Eyes: Negative for pain and discharge.   Respiratory: Positive for cough and shortness of breath.    Cardiovascular: Negative for chest pain and palpitations.   Gastrointestinal: Negative for abdominal pain and nausea.   Endocrine: Negative for cold intolerance and heat intolerance.   Genitourinary: Negative for difficulty urinating and dysuria.   Musculoskeletal: Negative for arthralgias and gait problem.   Skin: Negative for color change and rash.   Neurological: Negative for dizziness and weakness.   Psychiatric/Behavioral: Negative for agitation and confusion.        Objective    Temp:  [96.5 °F (35.8 °C)-97 °F (36.1 °C)] 96.5 °F (35.8 °C)  Heart Rate:  [60-87] 78  Resp:  [16-18] 16  BP: ()/() 110/70    Physical Exam  Constitutional:       Appearance: He is well-developed. He is ill-appearing.   HENT:      Head: Normocephalic and atraumatic.      Nose: Nose normal.   Eyes:      Extraocular Movements: Extraocular movements intact.   Neck:      Musculoskeletal: Normal range of motion and neck supple.   Cardiovascular:      Rate and Rhythm: Normal rate and regular rhythm.   Pulmonary:      Effort: Pulmonary effort is normal.      Breath sounds: Rales present.   Abdominal:      General: Bowel sounds are normal.      Palpations: Abdomen is soft.   Musculoskeletal: Normal range of motion.   Skin:     General: Skin is warm and dry.   Neurological:      Mental Status: He is alert and oriented to person, place, and time.   Psychiatric:         Behavior: Behavior normal.       Results Review:  I have reviewed the labs, radiology results, and diagnostic studies.    Laboratory Data:   Results from last 7 days   Lab Units 12/10/20  0610 12/09/20  0656 12/08/20  8080    SODIUM mmol/L 129* 130* 132*   POTASSIUM mmol/L 4.0 4.7 4.2   CHLORIDE mmol/L 96* 97* 96*   CO2 mmol/L 23.0 21.0* 21.0*   BUN mg/dL 18 16 19   CREATININE mg/dL 0.49* 0.54* 0.70*  0.64*   GLUCOSE mg/dL 259* 301* 229*   CALCIUM mg/dL 8.8 9.4 9.0   BILIRUBIN mg/dL 0.7 0.7 0.7  0.7   ALK PHOS U/L 69 66 73  73   ALT (SGPT) U/L 21 22 30  29   AST (SGOT) U/L 17 18 24  25   ANION GAP mmol/L 10.0 12.0 15.0     Estimated Creatinine Clearance: 238.9 mL/min (A) (by C-G formula based on SCr of 0.49 mg/dL (L)).          Results from last 7 days   Lab Units 12/10/20  0610 12/09/20  0656 12/08/20  1351   WBC 10*3/mm3 13.15* 10.70 15.10*   HEMOGLOBIN g/dL 13.9 14.1 15.4   HEMATOCRIT % 39.2 39.9 44.6   PLATELETS 10*3/mm3 526* 564* 542*           Culture Data:   Blood Culture   Date Value Ref Range Status   12/08/2020 No growth at 24 hours  Preliminary   12/08/2020 No growth at 24 hours  Preliminary     No results found for: URINECX  No results found for: RESPCX  No results found for: WOUNDCX  No results found for: STOOLCX  No components found for: BODYFLD    Radiology Data:   Imaging Results (Last 24 Hours)     ** No results found for the last 24 hours. **          I have reviewed the patient's current medications.     Assessment/Plan   Assessment and Plan    Acute respiratory failure with hypoxemia     Due to below; wean oxygen as able    Bilateral viral pneumonia     Caused by below; continue with supportive therapy    COVID 19 infection     On dexamethasone, remdesivir, lovenox; will add zinc and vitamin D3 and melatonin    Hyperglycemia on DM type 2     Mildly elevated likely due to steroids use;     Continue with SSI       Chronic medical problems     Essential hypertension     Hyperlipidemia        Electronically signed by Leandro Gonzalez MD at 12/10/20 1604     Concepción Yepez APRN at 12/09/20 1434     Attestation signed by Teodoro Zimmerman MD at 12/09/20 3852    I personally evaluated and examined the patient  in conjunction with Concepción Yepez APRN and agree with the assessment, treatment plan, and disposition of the patient as recorded.  Cardiovascular: Normal S1 and S2.  Lungs: Distant breath sounds bilaterally.                         [facility] Medicine Services  INPATIENT PROGRESS NOTE    Length of Stay: 1  Date of Admission: 12/8/2020  Primary Care Physician: Efrem Deng MD    Subjective   Chief Complaint:     HPI:      12/9/20: The patient has been increased to 4 L of oxygen today.  Progression labs have improved today.  Patient states he has intermittent tightness in his chest.    H&P by Dr. Zimmerman:  Patient is a 45-year-old male with past medical history notable for diabetes mellitus, hyperlipidemia, hypertension, and chronic pain.  Patient presented to the emergency department due to worsening dyspnea.  Per patient report he had tested positive for COVID-19 at outside facility prior to arrival today.  He states that he first tested positive for Thanksgiving and his wife was positive prior to that.  He notes he has been having worsening shortness of breath with exertion the last 2 to 3 days now.  He notes that he had cough initially but this seems improved today.  He also had some initial nausea and vomiting but this is also better.  He also noted some difficulties with headache but that also seems somewhat improved.  He denies any body aches.  He also notes that he has had issues with his sense of taste and smell.     Patient was noted to be hypoxic on room air to 88% so he was placed on supplemental oxygen in the ED.  Chest x-ray was read as concerning for bilateral infiltrative changes consistent with Covid pneumonitis.  CBC showed a WBC of 15.1 with normal hemoglobin and hematocrit.  He did have a thrombosed cytosis with platelets of 2/5/1942.  Lactate, troponin T, and proBNP were all normal.  CMP shows a glucose of 229, creatinine 1.64, sodium 132, chloride  96, CO2 21, albumin 3.3.  Blood cultures were collected in the emergency department.    Review of Systems   Constitutional: Positive for fatigue. Negative for activity change.   HENT: Negative for ear pain and sore throat.    Eyes: Negative for pain and discharge.   Respiratory: Positive for cough and shortness of breath.    Cardiovascular: Negative for chest pain and palpitations.   Gastrointestinal: Negative for abdominal pain and nausea.   Endocrine: Negative for cold intolerance and heat intolerance.   Genitourinary: Negative for difficulty urinating and dysuria.   Musculoskeletal: Negative for arthralgias and gait problem.   Skin: Negative for color change and rash.   Neurological: Negative for dizziness and weakness.   Psychiatric/Behavioral: Negative for agitation and confusion.        Objective    Temp:  [96 °F (35.6 °C)-97.5 °F (36.4 °C)] 96 °F (35.6 °C)  Heart Rate:  [60-96] 71  Resp:  [18-20] 18  BP: (116-141)/(64-87) 120/76    Physical Exam  Constitutional:       Appearance: He is well-developed.   HENT:      Head: Normocephalic and atraumatic.   Eyes:      Pupils: Pupils are equal, round, and reactive to light.   Neck:      Musculoskeletal: Normal range of motion and neck supple.   Cardiovascular:      Rate and Rhythm: Normal rate and regular rhythm.   Pulmonary:      Effort: Pulmonary effort is normal.      Breath sounds: Normal breath sounds.   Abdominal:      General: Bowel sounds are normal.      Palpations: Abdomen is soft.   Musculoskeletal: Normal range of motion.   Skin:     General: Skin is warm and dry.   Neurological:      Mental Status: He is alert and oriented to person, place, and time.   Psychiatric:         Behavior: Behavior normal.       Results Review:  I have reviewed the labs, radiology results, and diagnostic studies.    Laboratory Data:   Results from last 7 days   Lab Units 12/09/20  0656 12/08/20  1351   SODIUM mmol/L 130* 132*   POTASSIUM mmol/L 4.7 4.2   CHLORIDE mmol/L 97*  96*   CO2 mmol/L 21.0* 21.0*   BUN mg/dL 16 19   CREATININE mg/dL 0.54* 0.70*  0.64*   GLUCOSE mg/dL 301* 229*   CALCIUM mg/dL 9.4 9.0   BILIRUBIN mg/dL 0.7 0.7  0.7   ALK PHOS U/L 66 73  73   ALT (SGPT) U/L 22 30  29   AST (SGOT) U/L 18 24  25   ANION GAP mmol/L 12.0 15.0     Estimated Creatinine Clearance: 216.8 mL/min (A) (by C-G formula based on SCr of 0.54 mg/dL (L)).          Results from last 7 days   Lab Units 12/09/20  0656 12/08/20  1351   WBC 10*3/mm3 10.70 15.10*   HEMOGLOBIN g/dL 14.1 15.4   HEMATOCRIT % 39.9 44.6   PLATELETS 10*3/mm3 564* 542*           Culture Data:   No results found for: BLOODCX  No results found for: URINECX  No results found for: RESPCX  No results found for: WOUNDCX  No results found for: STOOLCX  No components found for: BODYFLD    Radiology Data:   Imaging Results (Last 24 Hours)     ** No results found for the last 24 hours. **          I have reviewed the patient's current medications.     Assessment/Plan     Active Hospital Problems    Diagnosis POA   • Acute respiratory failure with hypoxia (CMS/[facility]) [J96.01] Unknown   • Pneumonia due to COVID-19 virus [U07.1, J12.89] Unknown   • Essential hypertension [I10] Yes   • Type 2 diabetes mellitus with hyperglycemia, with long-term current use of insulin (CMS/[facility]) [E11.65, Z79.4] Not Applicable       Plan:    1 .  Acute respiratory failure with hypoxia: Patient is currently on 4 L of oxygen.  We will continue to monitor closely and wean if possible.    2.  Pneumonia secondary to COVID-19 virus: Continue Decadron 6 mg daily, remdesivir, prophylactic antibiotics and progression labs.  Continue bronchodilators and scheduled albuterol.  Blood cultures negative at 24 hours.  3.  Hypertension: Continue home losartan.  4.  Hyperlipidemia: Continue home statin.  5.  Diabetes mellitus, type II: Continue SSI.        DVT prophylaxis with Lovenox twice daily per current standards and guidelines  CODE STATUS: Full    Discharge Planning: I  expect patient to be discharged to home in 3-4 days.      This document has been electronically signed by DAMIAN Lobato on December 9, 2020 14:34 CST          Electronically signed by Teodoro Zimmerman MD at 12/09/20 6655       Medical Student Notes (last 24 hours) (Notes from 12/10/20 1016 through 12/11/20 1016)    No notes of this type exist for this encounter.         Consult Notes (last 24 hours) (Notes from 12/10/20 1016 through 12/11/20 1016)    No notes of this type exist for this encounter.

## 2020-12-11 NOTE — PLAN OF CARE
Problem: Adult Inpatient Plan of Care  Goal: Plan of Care Review  Recent Flowsheet Documentation  Taken 12/11/2020 0137 by Bernadette Villegas RN  Progress: improving  Plan of Care Reviewed With: patient  Outcome Summary: vss, pt only using o2 when up, continues to get sob upon exertion, pain controlled with PO meds, resting well, will cont to monitor

## 2020-12-11 NOTE — PROGRESS NOTES
[facility] Medicine Services  INPATIENT PROGRESS NOTE    Length of Stay: 3  Date of Admission: 12/8/2020  Primary Care Physician: Efrem Deng MD    Subjective   Chief Complaint: I feel better and stronger but still with shortness of breath    H&P by Dr. Zimmerman:  Patient is a 45-year-old male with past medical history notable for diabetes mellitus, hyperlipidemia, hypertension, and chronic pain.  Patient presented to the emergency department due to worsening dyspnea.  Per patient report he had tested positive for COVID-19 at outside facility prior to arrival today.  He states that he first tested positive for Thanksgiving and his wife was positive prior to that.  He notes he has been having worsening shortness of breath with exertion the last 2 to 3 days now.  He notes that he had cough initially but this seems improved today.  He also had some initial nausea and vomiting but this is also better.  He also noted some difficulties with headache but that also seems somewhat improved.  He denies any body aches.  He also notes that he has had issues with his sense of taste and smell.     Patient was noted to be hypoxic on room air to 88% so he was placed on supplemental oxygen in the ED.  Chest x-ray was read as concerning for bilateral infiltrative changes consistent with Covid pneumonitis.  CBC showed a WBC of 15.1 with normal hemoglobin and hematocrit.  He did have a thrombosed cytosis with platelets of 2/5/1942.  Lactate, troponin T, and proBNP were all normal.  CMP shows a glucose of 229, creatinine 1.64, sodium 132, chloride 96, CO2 21, albumin 3.3.  Blood cultures were collected in the emergency department.    Review of Systems   Constitutional: Positive for fatigue. Negative for activity change.   HENT: Negative for ear pain and sore throat.    Eyes: Negative for pain and discharge.   Respiratory: Positive for cough and shortness of breath.    Cardiovascular:  Negative for chest pain and palpitations.   Gastrointestinal: Negative for abdominal pain and nausea.   Endocrine: Negative for cold intolerance and heat intolerance.   Genitourinary: Negative for difficulty urinating and dysuria.   Musculoskeletal: Negative for arthralgias and gait problem.   Skin: Negative for color change and rash.   Neurological: Negative for dizziness and weakness.   Psychiatric/Behavioral: Negative for agitation and confusion.        Objective    Temp:  [97 °F (36.1 °C)-97.3 °F (36.3 °C)] 97.3 °F (36.3 °C)  Heart Rate:  [] 73  Resp:  [16-22] 18  BP: (125-154)/(77-95) 125/85    Physical Exam  Constitutional:       Appearance: He is well-developed. He is ill-appearing.   HENT:      Head: Normocephalic and atraumatic.      Nose: Nose normal.   Eyes:      Extraocular Movements: Extraocular movements intact.   Neck:      Musculoskeletal: Normal range of motion and neck supple.   Cardiovascular:      Rate and Rhythm: Normal rate and regular rhythm.   Pulmonary:      Effort: Pulmonary effort is normal.      Breath sounds: Rales present.   Abdominal:      General: Bowel sounds are normal.      Palpations: Abdomen is soft.   Musculoskeletal: Normal range of motion.   Skin:     General: Skin is warm and dry.   Neurological:      Mental Status: He is alert and oriented to person, place, and time.   Psychiatric:         Behavior: Behavior normal.       Results Review:  I have reviewed the labs, radiology results, and diagnostic studies.    Laboratory Data:   Results from last 7 days   Lab Units 12/11/20  0524 12/10/20  0610 12/09/20  0656   SODIUM mmol/L 125* 129* 130*   POTASSIUM mmol/L 4.0 4.0 4.7   CHLORIDE mmol/L 94* 96* 97*   CO2 mmol/L 21.0* 23.0 21.0*   BUN mg/dL 19 18 16   CREATININE mg/dL 0.62* 0.49* 0.54*   GLUCOSE mg/dL 366* 259* 301*   CALCIUM mg/dL 8.8 8.8 9.4   BILIRUBIN mg/dL 0.5 0.7 0.7   ALK PHOS U/L 91 69 66   ALT (SGPT) U/L 23 21 22   AST (SGOT) U/L 14 17 18   ANION GAP mmol/L  10.0 10.0 12.0     Estimated Creatinine Clearance: 189.7 mL/min (A) (by C-G formula based on SCr of 0.62 mg/dL (L)).          Results from last 7 days   Lab Units 12/11/20  0524 12/10/20  0610 12/09/20  0656 12/08/20  1351   WBC 10*3/mm3 12.51* 13.15* 10.70 15.10*   HEMOGLOBIN g/dL 14.0 13.9 14.1 15.4   HEMATOCRIT % 40.4 39.2 39.9 44.6   PLATELETS 10*3/mm3 552* 526* 564* 542*           Culture Data:   Blood Culture   Date Value Ref Range Status   12/08/2020 No growth at 2 days  Preliminary   12/08/2020 No growth at 2 days  Preliminary     No results found for: URINECX  No results found for: RESPCX  No results found for: WOUNDCX  No results found for: STOOLCX  No components found for: BODYFLD    Radiology Data:   Imaging Results (Last 24 Hours)     ** No results found for the last 24 hours. **          I have reviewed the patient's current medications.     Assessment/Plan   Assessment and Plan    Acute respiratory failure with hypoxemia     Due to below; wean oxygen as able    Bilateral viral pneumonia     Caused by below; continue with supportive therapy    COVID 19 infection     On dexamethasone, remdesivir, lovenox, zinc and vitamin C and D3 and melatonin    Hyponatremia      Corrected hyponatremia is 129;     Hyperglycemia on DM type 2     Marked elevated likely due to steroids use in the setting of uncontrolled DM with A1C 8.8     Change to moderate SSI and continue with long acting insulin    Chronic medical problems     Essential hypertension     Hyperlipidemia

## 2020-12-12 PROBLEM — A41.9 SEPSIS (HCC): Status: ACTIVE | Noted: 2020-12-12

## 2020-12-12 LAB
ALBUMIN SERPL-MCNC: 3.4 G/DL (ref 3.5–5.2)
ALBUMIN/GLOB SERPL: 0.9 G/DL
ALP SERPL-CCNC: 81 U/L (ref 39–117)
ALT SERPL W P-5'-P-CCNC: 37 U/L (ref 1–41)
ANION GAP SERPL CALCULATED.3IONS-SCNC: 9 MMOL/L (ref 5–15)
AST SERPL-CCNC: 23 U/L (ref 1–40)
BASOPHILS # BLD AUTO: 0.04 10*3/MM3 (ref 0–0.2)
BASOPHILS NFR BLD AUTO: 0.3 % (ref 0–1.5)
BILIRUB CONJ SERPL-MCNC: 0.2 MG/DL (ref 0–0.3)
BILIRUB SERPL-MCNC: 0.6 MG/DL (ref 0–1.2)
BUN SERPL-MCNC: 15 MG/DL (ref 6–20)
BUN/CREAT SERPL: 25.9 (ref 7–25)
CALCIUM SPEC-SCNC: 9.1 MG/DL (ref 8.6–10.5)
CHLORIDE SERPL-SCNC: 95 MMOL/L (ref 98–107)
CK SERPL-CCNC: 14 U/L (ref 20–200)
CO2 SERPL-SCNC: 24 MMOL/L (ref 22–29)
CREAT SERPL-MCNC: 0.58 MG/DL (ref 0.76–1.27)
CRP SERPL-MCNC: 0.29 MG/DL (ref 0–0.5)
DEPRECATED RDW RBC AUTO: 38.6 FL (ref 37–54)
EOSINOPHIL # BLD AUTO: 0.08 10*3/MM3 (ref 0–0.4)
EOSINOPHIL NFR BLD AUTO: 0.6 % (ref 0.3–6.2)
ERYTHROCYTE [DISTWIDTH] IN BLOOD BY AUTOMATED COUNT: 13.1 % (ref 12.3–15.4)
FERRITIN SERPL-MCNC: 1618 NG/ML (ref 30–400)
GFR SERPL CREATININE-BSD FRML MDRD: >150 ML/MIN/1.73
GLOBULIN UR ELPH-MCNC: 3.8 GM/DL
GLUCOSE BLDC GLUCOMTR-MCNC: 250 MG/DL (ref 70–130)
GLUCOSE BLDC GLUCOMTR-MCNC: 269 MG/DL (ref 70–130)
GLUCOSE BLDC GLUCOMTR-MCNC: 315 MG/DL (ref 70–130)
GLUCOSE BLDC GLUCOMTR-MCNC: 351 MG/DL (ref 70–130)
GLUCOSE BLDC GLUCOMTR-MCNC: 426 MG/DL (ref 70–130)
GLUCOSE BLDC GLUCOMTR-MCNC: 438 MG/DL (ref 70–130)
GLUCOSE BLDC GLUCOMTR-MCNC: 448 MG/DL (ref 70–130)
GLUCOSE BLDC GLUCOMTR-MCNC: 451 MG/DL (ref 70–130)
GLUCOSE SERPL-MCNC: 257 MG/DL (ref 65–99)
HCT VFR BLD AUTO: 41.7 % (ref 37.5–51)
HGB BLD-MCNC: 14.7 G/DL (ref 13–17.7)
IMM GRANULOCYTES # BLD AUTO: 0.52 10*3/MM3 (ref 0–0.05)
IMM GRANULOCYTES NFR BLD AUTO: 3.8 % (ref 0–0.5)
LYMPHOCYTES # BLD AUTO: 3.53 10*3/MM3 (ref 0.7–3.1)
LYMPHOCYTES NFR BLD AUTO: 25.8 % (ref 19.6–45.3)
MCH RBC QN AUTO: 28.9 PG (ref 26.6–33)
MCHC RBC AUTO-ENTMCNC: 35.3 G/DL (ref 31.5–35.7)
MCV RBC AUTO: 81.9 FL (ref 79–97)
MONOCYTES # BLD AUTO: 1.11 10*3/MM3 (ref 0.1–0.9)
MONOCYTES NFR BLD AUTO: 8.1 % (ref 5–12)
NEUTROPHILS NFR BLD AUTO: 61.4 % (ref 42.7–76)
NEUTROPHILS NFR BLD AUTO: 8.41 10*3/MM3 (ref 1.7–7)
NRBC BLD AUTO-RTO: 0 /100 WBC (ref 0–0.2)
PLATELET # BLD AUTO: 509 10*3/MM3 (ref 140–450)
PMV BLD AUTO: 8.9 FL (ref 6–12)
POTASSIUM SERPL-SCNC: 4.1 MMOL/L (ref 3.5–5.2)
PROT SERPL-MCNC: 7.2 G/DL (ref 6–8.5)
RBC # BLD AUTO: 5.09 10*6/MM3 (ref 4.14–5.8)
SODIUM SERPL-SCNC: 128 MMOL/L (ref 136–145)
WBC # BLD AUTO: 13.69 10*3/MM3 (ref 3.4–10.8)

## 2020-12-12 PROCEDURE — 80053 COMPREHEN METABOLIC PANEL: CPT | Performed by: FAMILY MEDICINE

## 2020-12-12 PROCEDURE — 63710000001 INSULIN ASPART PER 5 UNITS: Performed by: INTERNAL MEDICINE

## 2020-12-12 PROCEDURE — 82962 GLUCOSE BLOOD TEST: CPT

## 2020-12-12 PROCEDURE — 86140 C-REACTIVE PROTEIN: CPT | Performed by: FAMILY MEDICINE

## 2020-12-12 PROCEDURE — 63710000001 INSULIN DETEMIR PER 5 UNITS: Performed by: FAMILY MEDICINE

## 2020-12-12 PROCEDURE — 82550 ASSAY OF CK (CPK): CPT | Performed by: FAMILY MEDICINE

## 2020-12-12 PROCEDURE — 25010000002 DEXAMETHASONE PER 1 MG: Performed by: FAMILY MEDICINE

## 2020-12-12 PROCEDURE — 82248 BILIRUBIN DIRECT: CPT | Performed by: FAMILY MEDICINE

## 2020-12-12 PROCEDURE — 94799 UNLISTED PULMONARY SVC/PX: CPT

## 2020-12-12 PROCEDURE — 82728 ASSAY OF FERRITIN: CPT | Performed by: FAMILY MEDICINE

## 2020-12-12 PROCEDURE — 85025 COMPLETE CBC W/AUTO DIFF WBC: CPT | Performed by: FAMILY MEDICINE

## 2020-12-12 PROCEDURE — 25010000002 ENOXAPARIN PER 10 MG: Performed by: FAMILY MEDICINE

## 2020-12-12 RX ADMIN — REMDESIVIR 100 MG: 100 INJECTION, POWDER, LYOPHILIZED, FOR SOLUTION INTRAVENOUS at 17:01

## 2020-12-12 RX ADMIN — GABAPENTIN 800 MG: 400 CAPSULE ORAL at 03:47

## 2020-12-12 RX ADMIN — ENOXAPARIN SODIUM 40 MG: 40 INJECTION SUBCUTANEOUS at 20:22

## 2020-12-12 RX ADMIN — ZINC SULFATE 220 MG (50 MG) CAPSULE 220 MG: CAPSULE at 20:22

## 2020-12-12 RX ADMIN — SODIUM CHLORIDE, PRESERVATIVE FREE 10 ML: 5 INJECTION INTRAVENOUS at 08:17

## 2020-12-12 RX ADMIN — OXYCODONE HYDROCHLORIDE AND ACETAMINOPHEN 1 TABLET: 10; 325 TABLET ORAL at 12:49

## 2020-12-12 RX ADMIN — SERTRALINE HYDROCHLORIDE 100 MG: 50 TABLET ORAL at 08:15

## 2020-12-12 RX ADMIN — ALBUTEROL SULFATE 2 PUFF: 90 AEROSOL, METERED RESPIRATORY (INHALATION) at 14:23

## 2020-12-12 RX ADMIN — OXYCODONE HYDROCHLORIDE AND ACETAMINOPHEN 1 TABLET: 10; 325 TABLET ORAL at 17:26

## 2020-12-12 RX ADMIN — ENOXAPARIN SODIUM 40 MG: 40 INJECTION SUBCUTANEOUS at 08:15

## 2020-12-12 RX ADMIN — DOCUSATE SODIUM 100 MG: 100 CAPSULE, LIQUID FILLED ORAL at 20:22

## 2020-12-12 RX ADMIN — OXYCODONE HYDROCHLORIDE AND ACETAMINOPHEN 4000 MG: 500 TABLET ORAL at 20:23

## 2020-12-12 RX ADMIN — MELATONIN 9 MG: at 20:22

## 2020-12-12 RX ADMIN — INSULIN ASPART 5 UNITS: 100 INJECTION, SOLUTION INTRAVENOUS; SUBCUTANEOUS at 08:18

## 2020-12-12 RX ADMIN — GABAPENTIN 800 MG: 400 CAPSULE ORAL at 20:22

## 2020-12-12 RX ADMIN — OXYCODONE HYDROCHLORIDE AND ACETAMINOPHEN 4000 MG: 500 TABLET ORAL at 08:14

## 2020-12-12 RX ADMIN — Medication 10000 UNITS: at 08:14

## 2020-12-12 RX ADMIN — INSULIN ASPART 14 UNITS: 100 INJECTION, SOLUTION INTRAVENOUS; SUBCUTANEOUS at 17:06

## 2020-12-12 RX ADMIN — GABAPENTIN 800 MG: 400 CAPSULE ORAL at 12:46

## 2020-12-12 RX ADMIN — ATORVASTATIN CALCIUM 40 MG: 40 TABLET, FILM COATED ORAL at 20:22

## 2020-12-12 RX ADMIN — LOSARTAN POTASSIUM 50 MG: 50 TABLET, FILM COATED ORAL at 20:22

## 2020-12-12 RX ADMIN — OXYCODONE HYDROCHLORIDE AND ACETAMINOPHEN 1 TABLET: 10; 325 TABLET ORAL at 22:39

## 2020-12-12 RX ADMIN — ALBUTEROL SULFATE 2 PUFF: 90 AEROSOL, METERED RESPIRATORY (INHALATION) at 19:07

## 2020-12-12 RX ADMIN — ALBUTEROL SULFATE 2 PUFF: 90 AEROSOL, METERED RESPIRATORY (INHALATION) at 07:10

## 2020-12-12 RX ADMIN — OXYCODONE HYDROCHLORIDE AND ACETAMINOPHEN 4000 MG: 500 TABLET ORAL at 03:47

## 2020-12-12 RX ADMIN — ZINC SULFATE 220 MG (50 MG) CAPSULE 220 MG: CAPSULE at 08:13

## 2020-12-12 RX ADMIN — SODIUM CHLORIDE, PRESERVATIVE FREE 10 ML: 5 INJECTION INTRAVENOUS at 20:23

## 2020-12-12 RX ADMIN — OXYCODONE HYDROCHLORIDE AND ACETAMINOPHEN 4000 MG: 500 TABLET ORAL at 00:07

## 2020-12-12 RX ADMIN — OXYCODONE HYDROCHLORIDE AND ACETAMINOPHEN 4000 MG: 500 TABLET ORAL at 12:45

## 2020-12-12 RX ADMIN — INSULIN DETEMIR 20 UNITS: 100 INJECTION, SOLUTION SUBCUTANEOUS at 20:14

## 2020-12-12 RX ADMIN — OXYCODONE HYDROCHLORIDE AND ACETAMINOPHEN 1 TABLET: 10; 325 TABLET ORAL at 08:15

## 2020-12-12 RX ADMIN — DOCUSATE SODIUM 100 MG: 100 CAPSULE, LIQUID FILLED ORAL at 08:15

## 2020-12-12 RX ADMIN — INSULIN ASPART 10 UNITS: 100 INJECTION, SOLUTION INTRAVENOUS; SUBCUTANEOUS at 11:50

## 2020-12-12 RX ADMIN — DEXAMETHASONE SODIUM PHOSPHATE 6 MG: 4 INJECTION, SOLUTION INTRAMUSCULAR; INTRAVENOUS at 08:18

## 2020-12-12 RX ADMIN — OXYCODONE HYDROCHLORIDE AND ACETAMINOPHEN 4000 MG: 500 TABLET ORAL at 16:59

## 2020-12-12 NOTE — PROGRESS NOTES
[facility] Medicine Services  INPATIENT PROGRESS NOTE    Length of Stay: 4  Date of Admission: 12/8/2020  Primary Care Physician: Efrem Deng MD    Subjective   Chief Complaint: shortness of breath   HPI:  45 year old male with past medical history of type 2 DM, HTN, HLD, chronic pain, obesity with BMI of 35.24 who presented on 12/8/2020 with shortness of breath.  He is currently admitted for sepsis due to bilateral pneumonia related to COVID 19.  During today's visit, he reports feeling stronger.  He is eating and drinking well.  Reports he still has fatigue and dyspnea with exertion.     Review of Systems   Constitutional: Positive for activity change and fatigue. Negative for appetite change, chills and fever.   HENT: Negative for congestion, rhinorrhea and sore throat.    Respiratory: Positive for cough and shortness of breath. Negative for chest tightness and wheezing.    Cardiovascular: Negative for chest pain, palpitations and leg swelling.   Gastrointestinal: Negative for abdominal pain, diarrhea, nausea and vomiting.   Musculoskeletal: Negative for back pain, myalgias and neck pain.   Skin: Negative for pallor.   Neurological: Negative for dizziness, weakness and headaches.   Psychiatric/Behavioral: Negative for confusion. The patient is not nervous/anxious.         All pertinent negatives and positives are as above. All other systems have been reviewed and are negative unless otherwise stated.     Objective    Temp:  [95.6 °F (35.3 °C)-97.6 °F (36.4 °C)] 95.6 °F (35.3 °C)  Heart Rate:  [61-91] 74  Resp:  [18-21] 20  BP: (101-144)/(66-98) 101/66    Physical Exam  Vitals signs and nursing note reviewed.   Constitutional:       Appearance: He is obese.   HENT:      Head: Normocephalic and atraumatic.      Right Ear: External ear normal.      Left Ear: External ear normal.      Nose: Nose normal.      Mouth/Throat:      Mouth: Mucous membranes are moist.       Pharynx: Oropharynx is clear.   Eyes:      General: No scleral icterus.        Right eye: No discharge.         Left eye: No discharge.      Conjunctiva/sclera: Conjunctivae normal.   Neck:      Musculoskeletal: Normal range of motion and neck supple.   Cardiovascular:      Rate and Rhythm: Normal rate and regular rhythm.      Pulses: Normal pulses.      Heart sounds: Normal heart sounds. No murmur. No friction rub. No gallop.    Pulmonary:      Effort: Pulmonary effort is normal. No accessory muscle usage or respiratory distress.      Breath sounds: No stridor. Examination of the right-lower field reveals decreased breath sounds. Examination of the left-lower field reveals decreased breath sounds. Decreased breath sounds present. No wheezing, rhonchi or rales.   Abdominal:      General: Bowel sounds are normal. There is no distension.      Palpations: Abdomen is soft.      Tenderness: There is no abdominal tenderness.   Musculoskeletal: Normal range of motion.         General: No swelling.   Skin:     General: Skin is warm and dry.   Neurological:      General: No focal deficit present.      Mental Status: He is alert and oriented to person, place, and time.   Psychiatric:         Mood and Affect: Mood normal.         Behavior: Behavior normal.             Results Review:  I have reviewed the labs, radiology results, and diagnostic studies.    Laboratory Data:   Results from last 7 days   Lab Units 12/12/20  0720 12/11/20  2035 12/11/20  0524 12/10/20  0610   SODIUM mmol/L 128*  --  125* 129*   POTASSIUM mmol/L 4.1  --  4.0 4.0   CHLORIDE mmol/L 95*  --  94* 96*   CO2 mmol/L 24.0  --  21.0* 23.0   BUN mg/dL 15  --  19 18   CREATININE mg/dL 0.58*  --  0.62* 0.49*   GLUCOSE mg/dL 257* 381* 366* 259*   CALCIUM mg/dL 9.1  --  8.8 8.8   BILIRUBIN mg/dL 0.6  --  0.5 0.7   ALK PHOS U/L 81  --  91 69   ALT (SGPT) U/L 37  --  23 21   AST (SGOT) U/L 23  --  14 17   ANION GAP mmol/L 9.0  --  10.0 10.0     Estimated  Creatinine Clearance: 202.8 mL/min (A) (by C-G formula based on SCr of 0.58 mg/dL (L)).          Results from last 7 days   Lab Units 12/12/20  0720 12/11/20  0524 12/10/20  0610 12/09/20  0656 12/08/20  1351   WBC 10*3/mm3 13.69* 12.51* 13.15* 10.70 15.10*   HEMOGLOBIN g/dL 14.7 14.0 13.9 14.1 15.4   HEMATOCRIT % 41.7 40.4 39.2 39.9 44.6   PLATELETS 10*3/mm3 509* 552* 526* 564* 542*           Culture Data:   No results found for: BLOODCX  No results found for: URINECX  No results found for: RESPCX  No results found for: WOUNDCX  No results found for: STOOLCX  No components found for: BODYFLD    Radiology Data:   Imaging Results (Last 24 Hours)     ** No results found for the last 24 hours. **          I have reviewed the patient's current medications.     Assessment/Plan     Active Hospital Problems    Diagnosis   • Sepsis (CMS/HCC)   • Acute respiratory failure with hypoxia (CMS/HCC)   • Pneumonia due to COVID-19 virus   • Essential hypertension   • Type 2 diabetes mellitus with hyperglycemia, with long-term current use of insulin (CMS/[facility])       Plan:    1. Sepsis: resolved.  Afebrile.  BP stable.    2. Acute hypoxic respiratory failure: continue o2 support and treatment of COVID 19.   3. Bilateral pneumonia related to COVID 19: continue o2 support PRN, Remdisivir (day 5/5), Albuterol, Decadron, Lovenox, Vitamin C, Zinc.    4. HTN: Losartan.   5. Type 2 DM: FSBS AC and HS with SSI, Levemir QHS.   6. Obesity with BMI of 35.24:     Discharge Planning: I expect patient to be discharged to home in 1 days.          This document has been electronically signed by DAMIAN Olmos on December 12, 2020 10:50 CST

## 2020-12-13 ENCOUNTER — READMISSION MANAGEMENT (OUTPATIENT)
Dept: CALL CENTER | Facility: HOSPITAL | Age: 45
End: 2020-12-13

## 2020-12-13 VITALS
DIASTOLIC BLOOD PRESSURE: 82 MMHG | BODY MASS INDEX: 38.2 KG/M2 | HEIGHT: 70 IN | RESPIRATION RATE: 18 BRPM | TEMPERATURE: 96.3 F | SYSTOLIC BLOOD PRESSURE: 130 MMHG | WEIGHT: 266.8 LBS | OXYGEN SATURATION: 95 % | HEART RATE: 86 BPM

## 2020-12-13 LAB
ALBUMIN SERPL-MCNC: 3 G/DL (ref 3.5–5.2)
ALBUMIN/GLOB SERPL: 0.9 G/DL
ALP SERPL-CCNC: 107 U/L (ref 39–117)
ALT SERPL W P-5'-P-CCNC: 41 U/L (ref 1–41)
ANION GAP SERPL CALCULATED.3IONS-SCNC: 12 MMOL/L (ref 5–15)
AST SERPL-CCNC: 18 U/L (ref 1–40)
BACTERIA SPEC AEROBE CULT: NORMAL
BACTERIA SPEC AEROBE CULT: NORMAL
BASOPHILS # BLD AUTO: 0.03 10*3/MM3 (ref 0–0.2)
BASOPHILS NFR BLD AUTO: 0.2 % (ref 0–1.5)
BILIRUB CONJ SERPL-MCNC: <0.2 MG/DL (ref 0–0.3)
BILIRUB SERPL-MCNC: 0.4 MG/DL (ref 0–1.2)
BUN SERPL-MCNC: 17 MG/DL (ref 6–20)
BUN/CREAT SERPL: 26.2 (ref 7–25)
CALCIUM SPEC-SCNC: 8.6 MG/DL (ref 8.6–10.5)
CHLORIDE SERPL-SCNC: 100 MMOL/L (ref 98–107)
CK SERPL-CCNC: 15 U/L (ref 20–200)
CO2 SERPL-SCNC: 21 MMOL/L (ref 22–29)
CREAT SERPL-MCNC: 0.65 MG/DL (ref 0.76–1.27)
CRP SERPL-MCNC: 0.2 MG/DL (ref 0–0.5)
D-DIMER, QUANTITATIVE (MAD,POW, STR): 430 NG/ML (FEU) (ref 0–470)
DEPRECATED RDW RBC AUTO: 39.3 FL (ref 37–54)
EOSINOPHIL # BLD AUTO: 0.08 10*3/MM3 (ref 0–0.4)
EOSINOPHIL NFR BLD AUTO: 0.6 % (ref 0.3–6.2)
ERYTHROCYTE [DISTWIDTH] IN BLOOD BY AUTOMATED COUNT: 13 % (ref 12.3–15.4)
FERRITIN SERPL-MCNC: 1247 NG/ML (ref 30–400)
FIBRINOGEN PPP-MCNC: 445 MG/DL (ref 228–514)
GFR SERPL CREATININE-BSD FRML MDRD: 133 ML/MIN/1.73
GLOBULIN UR ELPH-MCNC: 3.3 GM/DL
GLUCOSE BLDC GLUCOMTR-MCNC: 401 MG/DL (ref 70–130)
GLUCOSE SERPL-MCNC: 234 MG/DL (ref 65–99)
HCT VFR BLD AUTO: 39.7 % (ref 37.5–51)
HGB BLD-MCNC: 13.6 G/DL (ref 13–17.7)
IMM GRANULOCYTES # BLD AUTO: 0.34 10*3/MM3 (ref 0–0.05)
IMM GRANULOCYTES NFR BLD AUTO: 2.7 % (ref 0–0.5)
LDH SERPL-CCNC: 175 U/L (ref 135–225)
LYMPHOCYTES # BLD AUTO: 3.14 10*3/MM3 (ref 0.7–3.1)
LYMPHOCYTES NFR BLD AUTO: 24.5 % (ref 19.6–45.3)
MCH RBC QN AUTO: 28.5 PG (ref 26.6–33)
MCHC RBC AUTO-ENTMCNC: 34.3 G/DL (ref 31.5–35.7)
MCV RBC AUTO: 83.2 FL (ref 79–97)
MONOCYTES # BLD AUTO: 0.99 10*3/MM3 (ref 0.1–0.9)
MONOCYTES NFR BLD AUTO: 7.7 % (ref 5–12)
NEUTROPHILS NFR BLD AUTO: 64.3 % (ref 42.7–76)
NEUTROPHILS NFR BLD AUTO: 8.25 10*3/MM3 (ref 1.7–7)
NRBC BLD AUTO-RTO: 0 /100 WBC (ref 0–0.2)
PLATELET # BLD AUTO: 430 10*3/MM3 (ref 140–450)
PMV BLD AUTO: 9.5 FL (ref 6–12)
POTASSIUM SERPL-SCNC: 4.2 MMOL/L (ref 3.5–5.2)
PROT SERPL-MCNC: 6.3 G/DL (ref 6–8.5)
RBC # BLD AUTO: 4.77 10*6/MM3 (ref 4.14–5.8)
SODIUM SERPL-SCNC: 133 MMOL/L (ref 136–145)
WBC # BLD AUTO: 12.83 10*3/MM3 (ref 3.4–10.8)

## 2020-12-13 PROCEDURE — 82728 ASSAY OF FERRITIN: CPT | Performed by: FAMILY MEDICINE

## 2020-12-13 PROCEDURE — 94799 UNLISTED PULMONARY SVC/PX: CPT

## 2020-12-13 PROCEDURE — 85379 FIBRIN DEGRADATION QUANT: CPT | Performed by: FAMILY MEDICINE

## 2020-12-13 PROCEDURE — 86140 C-REACTIVE PROTEIN: CPT | Performed by: FAMILY MEDICINE

## 2020-12-13 PROCEDURE — 83615 LACTATE (LD) (LDH) ENZYME: CPT | Performed by: FAMILY MEDICINE

## 2020-12-13 PROCEDURE — 82962 GLUCOSE BLOOD TEST: CPT

## 2020-12-13 PROCEDURE — 82248 BILIRUBIN DIRECT: CPT | Performed by: FAMILY MEDICINE

## 2020-12-13 PROCEDURE — 80053 COMPREHEN METABOLIC PANEL: CPT | Performed by: FAMILY MEDICINE

## 2020-12-13 PROCEDURE — 82550 ASSAY OF CK (CPK): CPT | Performed by: FAMILY MEDICINE

## 2020-12-13 PROCEDURE — 63710000001 INSULIN ASPART PER 5 UNITS: Performed by: INTERNAL MEDICINE

## 2020-12-13 PROCEDURE — 25010000002 ENOXAPARIN PER 10 MG: Performed by: FAMILY MEDICINE

## 2020-12-13 PROCEDURE — 85025 COMPLETE CBC W/AUTO DIFF WBC: CPT | Performed by: FAMILY MEDICINE

## 2020-12-13 PROCEDURE — 85384 FIBRINOGEN ACTIVITY: CPT | Performed by: FAMILY MEDICINE

## 2020-12-13 RX ORDER — ZINC SULFATE 50(220)MG
220 CAPSULE ORAL 2 TIMES DAILY
Qty: 20 CAPSULE | Refills: 0 | Status: SHIPPED | OUTPATIENT
Start: 2020-12-13 | End: 2020-12-23

## 2020-12-13 RX ORDER — LANOLIN ALCOHOL/MO/W.PET/CERES
9 CREAM (GRAM) TOPICAL NIGHTLY
Qty: 30 TABLET | Refills: 0 | Status: SHIPPED | OUTPATIENT
Start: 2020-12-13 | End: 2020-12-23

## 2020-12-13 RX ORDER — MELATONIN
10000 DAILY
Qty: 100 TABLET | Refills: 0 | Status: SHIPPED | OUTPATIENT
Start: 2020-12-14 | End: 2020-12-24

## 2020-12-13 RX ADMIN — OXYCODONE HYDROCHLORIDE AND ACETAMINOPHEN 4000 MG: 500 TABLET ORAL at 00:45

## 2020-12-13 RX ADMIN — DOCUSATE SODIUM 100 MG: 100 CAPSULE, LIQUID FILLED ORAL at 09:03

## 2020-12-13 RX ADMIN — OXYCODONE HYDROCHLORIDE AND ACETAMINOPHEN 4000 MG: 500 TABLET ORAL at 09:00

## 2020-12-13 RX ADMIN — INSULIN ASPART 5 UNITS: 100 INJECTION, SOLUTION INTRAVENOUS; SUBCUTANEOUS at 08:58

## 2020-12-13 RX ADMIN — ENOXAPARIN SODIUM 40 MG: 40 INJECTION SUBCUTANEOUS at 08:59

## 2020-12-13 RX ADMIN — ALBUTEROL SULFATE 2 PUFF: 90 AEROSOL, METERED RESPIRATORY (INHALATION) at 10:48

## 2020-12-13 RX ADMIN — SERTRALINE HYDROCHLORIDE 100 MG: 50 TABLET ORAL at 09:01

## 2020-12-13 RX ADMIN — OXYCODONE HYDROCHLORIDE AND ACETAMINOPHEN 4000 MG: 500 TABLET ORAL at 04:48

## 2020-12-13 RX ADMIN — OXYCODONE HYDROCHLORIDE AND ACETAMINOPHEN 1 TABLET: 10; 325 TABLET ORAL at 04:54

## 2020-12-13 RX ADMIN — ALBUTEROL SULFATE 2 PUFF: 90 AEROSOL, METERED RESPIRATORY (INHALATION) at 07:11

## 2020-12-13 RX ADMIN — Medication 10000 UNITS: at 09:02

## 2020-12-13 RX ADMIN — GABAPENTIN 800 MG: 400 CAPSULE ORAL at 04:48

## 2020-12-13 RX ADMIN — ZINC SULFATE 220 MG (50 MG) CAPSULE 220 MG: CAPSULE at 09:03

## 2020-12-13 NOTE — DISCHARGE INSTRUCTIONS
YOU WILL NEED TO QUARANTINE UNTIL 12/22/2020.       COVID-19  COVID-19 is a respiratory infection that is caused by a virus called severe acute respiratory syndrome coronavirus 2 (SARS-CoV-2). The disease is also known as coronavirus disease or novel coronavirus. In some people, the virus may not cause any symptoms. In others, it may cause a serious infection. The infection can get worse quickly and can lead to complications, such as:  · Pneumonia, or infection of the lungs.  · Acute respiratory distress syndrome or ARDS. This is a condition in which fluid build-up in the lungs prevents the lungs from filling with air and passing oxygen into the blood.  · Acute respiratory failure. This is a condition in which there is not enough oxygen passing from the lungs to the body or when carbon dioxide is not passing from the lungs out of the body.  · Sepsis or septic shock. This is a serious bodily reaction to an infection.  · Blood clotting problems.  · Secondary infections due to bacteria or fungus.  · Organ failure. This is when your body's organs stop working.  The virus that causes COVID-19 is contagious. This means that it can spread from person to person through droplets from coughs and sneezes (respiratory secretions).  What are the causes?  This illness is caused by a virus. You may catch the virus by:  · Breathing in droplets from an infected person. Droplets can be spread by a person breathing, speaking, singing, coughing, or sneezing.  · Touching something, like a table or a doorknob, that was exposed to the virus (contaminated) and then touching your mouth, nose, or eyes.  What increases the risk?  Risk for infection  You are more likely to be infected with this virus if you:  · Are within 6 feet (2 meters) of a person with COVID-19.  · Provide care for or live with a person who is infected with COVID-19.  · Spend time in crowded indoor spaces or live in shared housing.  Risk for serious illness  You are more  "likely to become seriously ill from the virus if you:  · Are 50 years of age or older. The higher your age, the more you are at risk for serious illness.  · Live in a nursing home or long-term care facility.  · Have cancer.  · Have a long-term (chronic) disease such as:  ? Chronic lung disease, including chronic obstructive pulmonary disease or asthma.  ? A long-term disease that lowers your body's ability to fight infection (immunocompromised).  ? Heart disease, including heart failure, a condition in which the arteries that lead to the heart become narrow or blocked (coronary artery disease), a disease which makes the heart muscle thick, weak, or stiff (cardiomyopathy).  ? Diabetes.  ? Chronic kidney disease.  ? Sickle cell disease, a condition in which red blood cells have an abnormal \"sickle\" shape.  ? Liver disease.  · Are obese.  What are the signs or symptoms?  Symptoms of this condition can range from mild to severe. Symptoms may appear any time from 2 to 14 days after being exposed to the virus. They include:  · A fever or chills.  · A cough.  · Difficulty breathing.  · Headaches, body aches, or muscle aches.  · Runny or stuffy (congested) nose.  · A sore throat.  · New loss of taste or smell.  Some people may also have stomach problems, such as nausea, vomiting, or diarrhea.  Other people may not have any symptoms of COVID-19.  How is this diagnosed?  This condition may be diagnosed based on:  · Your signs and symptoms, especially if:  ? You live in an area with a COVID-19 outbreak.  ? You recently traveled to or from an area where the virus is common.  ? You provide care for or live with a person who was diagnosed with COVID-19.  ? You were exposed to a person who was diagnosed with COVID-19.  · A physical exam.  · Lab tests, which may include:  ? Taking a sample of fluid from the back of your nose and throat (nasopharyngeal fluid), your nose, or your throat using a swab.  ? A sample of mucus from your " lungs (sputum).  ? Blood tests.  · Imaging tests, which may include, X-rays, CT scan, or ultrasound.  How is this treated?  At present, there is no medicine to treat COVID-19. Medicines that treat other diseases are being used on a trial basis to see if they are effective against COVID-19.  Your health care provider will talk with you about ways to treat your symptoms. For most people, the infection is mild and can be managed at home with rest, fluids, and over-the-counter medicines.  Treatment for a serious infection usually takes places in a hospital intensive care unit (ICU). It may include one or more of the following treatments. These treatments are given until your symptoms improve.  · Receiving fluids and medicines through an IV.  · Supplemental oxygen. Extra oxygen is given through a tube in the nose, a face mask, or a byrne.  · Positioning you to lie on your stomach (prone position). This makes it easier for oxygen to get into the lungs.  · Continuous positive airway pressure (CPAP) or bi-level positive airway pressure (BPAP) machine. This treatment uses mild air pressure to keep the airways open. A tube that is connected to a motor delivers oxygen to the body.  · Ventilator. This treatment moves air into and out of the lungs by using a tube that is placed in your windpipe.  · Tracheostomy. This is a procedure to create a hole in the neck so that a breathing tube can be inserted.  · Extracorporeal membrane oxygenation (ECMO). This procedure gives the lungs a chance to recover by taking over the functions of the heart and lungs. It supplies oxygen to the body and removes carbon dioxide.  Follow these instructions at home:  Lifestyle  · If you are sick, stay home except to get medical care. Your health care provider will tell you how long to stay home. Call your health care provider before you go for medical care.  · Rest at home as told by your health care provider.  · Do not use any products that contain  nicotine or tobacco, such as cigarettes, e-cigarettes, and chewing tobacco. If you need help quitting, ask your health care provider.  · Return to your normal activities as told by your health care provider. Ask your health care provider what activities are safe for you.  General instructions  · Take over-the-counter and prescription medicines only as told by your health care provider.  · Drink enough fluid to keep your urine pale yellow.  · Keep all follow-up visits as told by your health care provider. This is important.  How is this prevented?    There is no vaccine to help prevent COVID-19 infection. However, there are steps you can take to protect yourself and others from this virus.  To protect yourself:   · Do not travel to areas where COVID-19 is a risk. The areas where COVID-19 is reported change often. To identify high-risk areas and travel restrictions, check the [facility] travel website: wwwnc.cdc.gov/travel/notices  · If you live in, or must travel to, an area where COVID-19 is a risk, take precautions to avoid infection.  ? Stay away from people who are sick.  ? Wash your hands often with soap and water for 20 seconds. If soap and water are not available, use an alcohol-based hand .  ? Avoid touching your mouth, face, eyes, or nose.  ? Avoid going out in public, follow guidance from your state and local health authorities.  ? If you must go out in public, wear a cloth face covering or face mask. Make sure your mask covers your nose and mouth.  ? Avoid crowded indoor spaces. Stay at least 6 feet (2 meters) away from others.  ? Disinfect objects and surfaces that are frequently touched every day. This may include:  § Counters and tables.  § Doorknobs and light switches.  § Sinks and faucets.  § Electronics, such as phones, remote controls, keyboards, computers, and tablets.  To protect others:  If you have symptoms of COVID-19, take steps to prevent the virus from spreading to others.  · If you think  you have a COVID-19 infection, contact your health care provider right away. Tell your health care team that you think you may have a COVID-19 infection.  · Stay home. Leave your house only to seek medical care. Do not use public transport.  · Do not travel while you are sick.  · Wash your hands often with soap and water for 20 seconds. If soap and water are not available, use alcohol-based hand .  · Stay away from other members of your household. Let healthy household members care for children and pets, if possible. If you have to care for children or pets, wash your hands often and wear a mask. If possible, stay in your own room, separate from others. Use a different bathroom.  · Make sure that all people in your household wash their hands well and often.  · Cough or sneeze into a tissue or your sleeve or elbow. Do not cough or sneeze into your hand or into the air.  · Wear a cloth face covering or face mask. Make sure your mask covers your nose and mouth.  Where to find more information  · Centers for Disease Control and Prevention: www.cdc.gov/coronavirus/2019-ncov/index.html  · World Health Organization: www.who.int/health-topics/coronavirus  Contact a health care provider if:  · You live in or have traveled to an area where COVID-19 is a risk and you have symptoms of the infection.  · You have had contact with someone who has COVID-19 and you have symptoms of the infection.  Get help right away if:  · You have trouble breathing.  · You have pain or pressure in your chest.  · You have confusion.  · You have bluish lips and fingernails.  · You have difficulty waking from sleep.  · You have symptoms that get worse.  These symptoms may represent a serious problem that is an emergency. Do not wait to see if the symptoms will go away. Get medical help right away. Call your local emergency services (911 in the [place] Do not drive yourself to the hospital. Let the emergency medical personnel know if you think  you have COVID-19.  Summary  · COVID-19 is a respiratory infection that is caused by a virus. It is also known as coronavirus disease or novel coronavirus. It can cause serious infections, such as pneumonia, acute respiratory distress syndrome, acute respiratory failure, or sepsis.  · The virus that causes COVID-19 is contagious. This means that it can spread from person to person through droplets from breathing, speaking, singing, coughing, or sneezing.  · You are more likely to develop a serious illness if you are 50 years of age or older, have a weak immune system, live in a nursing home, or have chronic disease.  · There is no medicine to treat COVID-19. Your health care provider will talk with you about ways to treat your symptoms.  · Take steps to protect yourself and others from infection. Wash your hands often and disinfect objects and surfaces that are frequently touched every day. Stay away from people who are sick and wear a mask if you are sick.  This information is not intended to replace advice given to you by your health care provider. Make sure you discuss any questions you have with your health care provider.  Document Revised: 10/16/2020 Document Reviewed: 01/23/2020  [employer] Patient Education © 2020 [employer]      How to Quarantine at Home  Information for Patients and Families    These instructions are for people with confirmed or suspected COVID-19 who do not need to be hospitalized and those with confirmed COVID-19 who were hospitalized and discharged to care for themselves at home.    If you were tested through the Health Department  The Health Department will monitor your wellbeing.  If it is determined that you do not need to be hospitalized and can be isolated at home, you will be monitored by staff from your local or state health department.     If you were tested through a [facility]  You will need to monitor yourself and report changes in your symptoms to your doctor.  See the  section below called Monitor Your Symptoms.    Follow these steps until a healthcare provider or local or state health department says you can return to your normal activities.    Stay home except to get medical care  • Restrict activities outside your home, except for getting medical care.   • Do not go to work, school, or public areas.   • Avoid using public transportation, ride-sharing, or taxis.    Separate yourself from other people and animals in your home  People  As much as possible, you should stay in a specific room and away from other people in your home. Also, you should use a separate bathroom, if available.    Animals  You should restrict contact with pets and other animals while you are sick with COVID-19, just like you would around other people. When possible, have another member of your household care for your animals while you are sick. If you are sick with COVID-19, avoid contact with your pet, including petting, snuggling, being kissed or licked, and sharing food. If you must care for your pet or be around animals while you are sick, wash your hands before and after you interact with pets and wear a facemask. See COVID-19 and Animals for more information.    Call ahead before visiting your doctor  If you have a medical appointment, call the healthcare provider and tell them that you have or may have COVID-19. This information will help the healthcare provider’s office take steps to keep other people from getting infected or exposed.    Wear a facemask  You should wear a facemask when you are around other people (e.g., sharing a room or vehicle) or pets and before you enter a healthcare provider’s office.     If you are not able to wear a facemask (for example, because it causes trouble breathing), then people who live with you should not stay in the same room with you, or they should wear a facemask if they enter your room.    Cover your coughs and sneezes  • Cover your mouth and nose with a  tissue when you cough or sneeze.   • Throw used tissues in a lined trash can.   • Immediately wash your hands with soap and water for at least 20 seconds or, if soap and water are not available, clean your hands with an alcohol-based hand  that contains at least 60% alcohol.    Clean your hands often  • Wash your hands often with soap and water for at least 20 seconds, especially after blowing your nose, coughing, or sneezing; going to the bathroom; and before eating or preparing food.     • If soap and water are not readily available, use an alcohol-based hand  with at least 60% alcohol, covering all surfaces of your hands and rubbing them together until they feel dry.    • Soap and water are the best option if hands are visibly dirty. Avoid touching your eyes, nose, and mouth with unwashed hands.    Avoid sharing personal household items  • You should not share dishes, drinking glasses, cups, eating utensils, towels, or bedding with other people or pets in your home.   • After using these items, they should be washed thoroughly with soap and water.    Clean all “high-touch” surfaces everyday  • High touch surfaces include counters, tabletops, doorknobs, bathroom fixtures, toilets, phones, keyboards, tablets, and bedside tables.   • Also, clean any surfaces that may have blood, stool, or body fluids on them.   • Use a household cleaning spray or wipe, according to the label instructions. Labels contain instructions for safe and effective use of the cleaning product, including precautions you should take when applying the product, such as wearing gloves and making sure you have good ventilation during use of the product.    Monitor your symptoms  • Seek prompt medical attention if your illness is worsening (e.g., difficulty breathing).   • Before seeking care, call your healthcare provider and tell them that you have, or are being evaluated for, COVID-19.   • Put on a facemask before you enter the  facility.     • These steps will help the healthcare provider’s office to keep other people in the office or waiting room from getting infected or exposed.   • Persons who are placed under active monitoring or facilitated self-monitoring should follow instructions provided by their local health department or occupational health professionals, as appropriate.  • If you have a medical emergency and need to call 911, notify the dispatch personnel that you have, or are being evaluated for COVID-19. If possible, put on a facemask before emergency medical services arrive.    Discontinuing home isolation  Patients with confirmed COVID-19 should remain under home isolation precautions until the risk of secondary transmission to others is thought to be low. The decision to discontinue home isolation precautions should be made on a case-by-case basis, in consultation with healthcare providers and state and local health departments.    The below content are for household members, intimate partners, and caregivers of a patient with symptomatic laboratory-confirmed COVID-19 or a patient under investigation:    Household members, intimate partners, and caregivers may have close contact with a person with symptomatic, laboratory-confirmed COVID-19 or a person under investigation.     Close contacts should monitor their health; they should call their healthcare provider right away if they develop symptoms suggestive of COVID-19 (e.g., fever, cough, shortness of breath)     Close contacts should also follow these recommendations:  • Make sure that you understand and can help the patient follow their healthcare provider’s instructions for medication(s) and care. You should help the patient with basic needs in the home and provide support for getting groceries, prescriptions, and other personal needs.  • Monitor the patient’s symptoms. If the patient is getting sicker, call his or her healthcare provider and tell them that the patient  has laboratory-confirmed COVID-19. This will help the healthcare provider’s office take steps to keep other people in the office or waiting room from getting infected. Ask the healthcare provider to call the local or state health department for additional guidance. If the patient has a medical emergency and you need to call 911, notify the dispatch personnel that the patient has, or is being evaluated for COVID-19.  • Household members should stay in another room or be  from the patient as much as possible. Household members should use a separate bedroom and bathroom, if available.  • Prohibit visitors who do not have an essential need to be in the home.  • Household members should care for any pets in the home. Do not handle pets or other animals while sick.  For more information, see COVID-19 and Animals.  • Make sure that shared spaces in the home have good air flow, such as by an air conditioner or an opened window, weather permitting.  • Perform hand hygiene frequently. Wash your hands often with soap and water for at least 20 seconds or use an alcohol-based hand  that contains 60 to 95% alcohol, covering all surfaces of your hands and rubbing them together until they feel dry. Soap and water should be used preferentially if hands are visibly dirty.  • Avoid touching your eyes, nose, and mouth with unwashed hands.  • The patient should wear a facemask when you are around other people. If the patient is not able to wear a facemask (for example, because it causes trouble breathing), you, as the caregiver, should wear a mask when you are in the same room as the patient.  • Wear a disposable facemask and gloves when you touch or have contact with the patient’s blood, stool, or body fluids, such as saliva, sputum, nasal mucus, vomit, or urine.   o Throw out disposable facemasks and gloves after using them. Do not reuse.  o When removing personal protective equipment, first remove and dispose of  gloves. Then, immediately clean your hands with soap and water or alcohol-based hand . Next, remove and dispose of facemask, and immediately clean your hands again with soap and water or alcohol-based hand .  • Avoid sharing household items with the patient. You should not share dishes, drinking glasses, cups, eating utensils, towels, bedding, or other items. After the patient uses these items, you should wash them thoroughly (see below “Wash laundry thoroughly”).  • Clean all “high-touch” surfaces, such as counters, tabletops, doorknobs, bathroom fixtures, toilets, phones, keyboards, tablets, and bedside tables, every day. Also, clean any surfaces that may have blood, stool, or body fluids on them.   o Use a household cleaning spray or wipe, according to the label instructions. Labels contain instructions for safe and effective use of the cleaning product including precautions you should take when applying the product, such as wearing gloves and making sure you have good ventilation during use of the product.  • Wash laundry thoroughly.   o Immediately remove and wash clothes or bedding that have blood, stool, or body fluids on them.  o Wear disposable gloves while handling soiled items and keep soiled items away from your body. Clean your hands (with soap and water or an alcohol-based hand ) immediately after removing your gloves.  o Read and follow directions on labels of laundry or clothing items and detergent. In general, using a normal laundry detergent according to washing machine instructions and dry thoroughly using the warmest temperatures recommended on the clothing label.  • Place all used disposable gloves, facemasks, and other contaminated items in a lined container before disposing of them with other household waste. Clean your hands (with soap and water or an alcohol-based hand ) immediately after handling these items. Soap and water should be used preferentially  if hands are visibly dirty.  • Discuss any additional questions with your state or local health department or healthcare provider.    Adapted from information provided by the Centers for Disease Control and Prevention.  For more information, visit https://www.cdc.gov/coronavirus/2019-ncov/hcp/guidance-prevent-spread.html

## 2020-12-13 NOTE — DISCHARGE SUMMARY
[facility]  DISCHARGE SUMMARY       Date of Admission: 12/8/2020  Date of Discharge:  12/13/2020  Primary Care Physician: Efrem Deng MD    Presenting Problem/History of Present Illness:  Acute respiratory failure with hypoxia (CMS/[facility]) [J96.01]  Pneumonia due to COVID-19 virus [U07.1, J12.89]       Final Discharge Diagnoses:    Acute respiratory failure with hypoxemia     Due to below; resolved; breathing at room air     Bilateral viral pneumonia     Caused by below; resolving; follow up with PCP in the next week     COVID 19 infection     will continue with zinc, vitamin C and D3 and melatonin     Completed remdesevir last night     Hyponatremia      resolved     Hyperglycemia on DM type 2     Marked elevated likely due to steroids use in the setting of uncontrolled DM with A1C 8.8     will stop steroids; continue with home meds     Chronic medical problems     Essential hypertension     Hyperlipidemia      Consults:     Consults     No orders found from 11/9/2020 to 12/9/2020.          Procedures Performed: Bone               Pertinent Test Results:   Lab Results (most recent)     Procedure Component Value Units Date/Time    Ferritin [[id number]]  (Abnormal) Collected: 12/13/20 0703    Specimen: Blood Updated: 12/13/20 1059     Ferritin 1,247.00 ng/mL     Narrative:      Results may be falsely decreased if patient taking Biotin.      Comprehensive Metabolic Panel [[id number]]  (Abnormal) Collected: 12/13/20 0703    Specimen: Blood Updated: 12/13/20 1053     Glucose 234 mg/dL      BUN 17 mg/dL      Creatinine 0.65 mg/dL      Sodium 133 mmol/L      Potassium 4.2 mmol/L      Chloride 100 mmol/L      CO2 21.0 mmol/L      Calcium 8.6 mg/dL      Total Protein 6.3 g/dL      Albumin 3.00 g/dL      ALT (SGPT) 41 U/L      AST (SGOT) 18 U/L      Alkaline Phosphatase 107 U/L      Total Bilirubin 0.4 mg/dL      eGFR Non African Amer 133 mL/min/1.73       Globulin 3.3 gm/dL      A/G Ratio 0.9 g/dL      BUN/Creatinine Ratio 26.2     Anion Gap 12.0 mmol/L     Narrative:      GFR Normal >60  Chronic Kidney Disease <60  Kidney Failure <15      CK [[id number]]  (Abnormal) Collected: 12/13/20 0703    Specimen: Blood Updated: 12/13/20 1053     Creatine Kinase 15 U/L     C-reactive Protein [[id number]]  (Normal) Collected: 12/13/20 0703    Specimen: Blood Updated: 12/13/20 1053     C-Reactive Protein 0.20 mg/dL     Lactate Dehydrogenase [[id number]]  (Normal) Collected: 12/13/20 0703    Specimen: Blood Updated: 12/13/20 1053      U/L     Bilirubin, Direct [[id number]]  (Normal) Collected: 12/13/20 0703    Specimen: Blood Updated: 12/13/20 1053     Bilirubin, Direct <0.2 mg/dL     D-dimer, Quantitative [[id number]]  (Normal) Collected: 12/13/20 0703    Specimen: Blood Updated: 12/13/20 0922     D-Dimer, Quantitative 430 ng/mL (FEU)     Narrative:      Dimer values <500 ng/ml FEU are FDA approved as aid in diagnosis of deep venous thrombosis and pulmonary embolism.  This test should not be used in an exclusion strategy with pretest probability alone.    A recent guideline regarding diagnosis for pulmonary thromboembolism recommends an adjusted exclusion criterion of age x 10 ng/ml FEU for patients >50 years of age (Mad[employer]uri Intern Med 2015; 163: 701-711).      Fibrinogen [[id number]]  (Normal) Collected: 12/13/20 0703    Specimen: Blood Updated: 12/13/20 0921     Fibrinogen 445 mg/dL     CBC & Differential [[id number]]  (Abnormal) Collected: 12/13/20 0703    Specimen: Blood Updated: 12/13/20 0908    Narrative:      The following orders were created for panel order CBC & Differential.  Procedure                               Abnormality         Status                     ---------                               -----------         ------                     CBC Auto Differential[[id number]]        Abnormal            Final result                 Please view results for these tests on  the individual orders.    CBC Auto Differential [[id number]]  (Abnormal) Collected: 12/13/20 0703    Specimen: Blood Updated: 12/13/20 0908     WBC 12.83 10*3/mm3      RBC 4.77 10*6/mm3      Hemoglobin 13.6 g/dL      Hematocrit 39.7 %      MCV 83.2 fL      MCH 28.5 pg      MCHC 34.3 g/dL      RDW 13.0 %      RDW-SD 39.3 fl      MPV 9.5 fL      Platelets 430 10*3/mm3      Neutrophil % 64.3 %      Lymphocyte % 24.5 %      Monocyte % 7.7 %      Eosinophil % 0.6 %      Basophil % 0.2 %      Immature Grans % 2.7 %      Neutrophils, Absolute 8.25 10*3/mm3      Lymphocytes, Absolute 3.14 10*3/mm3      Monocytes, Absolute 0.99 10*3/mm3      Eosinophils, Absolute 0.08 10*3/mm3      Basophils, Absolute 0.03 10*3/mm3      Immature Grans, Absolute 0.34 10*3/mm3      nRBC 0.0 /100 WBC     POC Glucose Once [[id number]]  (Abnormal) Collected: 12/12/20 1916    Specimen: Blood Updated: 12/12/20 2059     Glucose 351 mg/dL      Comment: RN NotifiedOperator: [id number] MESSI MIKAYLAMeter ID: [id number]       POC Glucose Once [[id number]]  (Abnormal) Collected: 12/12/20 1048    Specimen: Blood Updated: 12/12/20 2058     Glucose 315 mg/dL      Comment: RN NotifiedOperator: [id number] KATE HAROMeter ID: [id number]       Blood Culture - Blood, Arm, Left [[id number]] Collected: 12/08/20 1713    Specimen: Blood from Arm, Left Updated: 12/12/20 1730     Blood Culture No growth at 4 days    Blood Culture - Blood, Arm, Right [[id number]] Collected: 12/08/20 1443    Specimen: Blood from Arm, Right Updated: 12/12/20 1500     Blood Culture No growth at 4 days    CBC & Differential [[id number]]  (Abnormal) Collected: 12/12/20 0720    Specimen: Blood Updated: 12/12/20 1015    Narrative:      The following orders were created for panel order CBC & Differential.  Procedure                               Abnormality         Status                     ---------                               -----------         ------                     Scan  Slide[[id number]]                                                                  CBC Auto Differential[[id number]]        Abnormal            Final result                 Please view results for these tests on the individual orders.    Comprehensive Metabolic Panel [[id number]]  (Abnormal) Collected: 12/12/20 0720    Specimen: Blood Updated: 12/12/20 0815     Glucose 257 mg/dL      BUN 15 mg/dL      Creatinine 0.58 mg/dL      Sodium 128 mmol/L      Potassium 4.1 mmol/L      Chloride 95 mmol/L      CO2 24.0 mmol/L      Calcium 9.1 mg/dL      Total Protein 7.2 g/dL      Albumin 3.40 g/dL      ALT (SGPT) 37 U/L      AST (SGOT) 23 U/L      Alkaline Phosphatase 81 U/L      Total Bilirubin 0.6 mg/dL      eGFR Non African Amer >150 mL/min/1.73      Globulin 3.8 gm/dL      A/G Ratio 0.9 g/dL      BUN/Creatinine Ratio 25.9     Anion Gap 9.0 mmol/L     Narrative:      GFR Normal >60  Chronic Kidney Disease <60  Kidney Failure <15      CK [[id number]]  (Abnormal) Collected: 12/12/20 0720    Specimen: Blood Updated: 12/12/20 0815     Creatine Kinase 14 U/L     C-reactive Protein [[id number]]  (Normal) Collected: 12/12/20 0720    Specimen: Blood Updated: 12/12/20 0815     C-Reactive Protein 0.29 mg/dL     Bilirubin, Direct [[id number]]  (Normal) Collected: 12/12/20 0720    Specimen: Blood Updated: 12/12/20 0815     Bilirubin, Direct 0.2 mg/dL     Ferritin [[id number]]  (Abnormal) Collected: 12/12/20 0720    Specimen: Blood Updated: 12/12/20 0812     Ferritin 1,618.00 ng/mL     Narrative:      Results may be falsely decreased if patient taking Biotin.      CBC Auto Differential [[id number]]  (Abnormal) Collected: 12/12/20 0720    Specimen: Blood Updated: 12/12/20 0744     WBC 13.69 10*3/mm3      RBC 5.09 10*6/mm3      Hemoglobin 14.7 g/dL      Hematocrit 41.7 %      MCV 81.9 fL      MCH 28.9 pg      MCHC 35.3 g/dL      RDW 13.1 %      RDW-SD 38.6 fl      MPV 8.9 fL      Platelets 509 10*3/mm3      Neutrophil % 61.4 %      Lymphocyte %  25.8 %      Monocyte % 8.1 %      Eosinophil % 0.6 %      Basophil % 0.3 %      Immature Grans % 3.8 %      Neutrophils, Absolute 8.41 10*3/mm3      Lymphocytes, Absolute 3.53 10*3/mm3      Monocytes, Absolute 1.11 10*3/mm3      Eosinophils, Absolute 0.08 10*3/mm3      Basophils, Absolute 0.04 10*3/mm3      Immature Grans, Absolute 0.52 10*3/mm3      nRBC 0.0 /100 WBC     Glucose, Random [[id number]]  (Abnormal) Collected: 12/11/20 2035    Specimen: Blood Updated: 12/11/20 2111     Glucose 381 mg/dL     D-dimer, Quantitative [[id number]]  (Abnormal) Collected: 12/11/20 0524    Specimen: Blood Updated: 12/11/20 0721     D-Dimer, Quantitative 777 ng/mL (FEU)     Narrative:      Dimer values <500 ng/ml FEU are FDA approved as aid in diagnosis of deep venous thrombosis and pulmonary embolism.  This test should not be used in an exclusion strategy with pretest probability alone.    A recent guideline regarding diagnosis for pulmonary thromboembolism recommends an adjusted exclusion criterion of age x 10 ng/ml FEU for patients >50 years of age (Madhuri Intern Med 2015; 163: 701-711).      Fibrinogen [[id number]]  (Normal) Collected: 12/11/20 0524    Specimen: Blood Updated: 12/11/20 0721     Fibrinogen 506 mg/dL     Procalcitonin [[id number]]  (Normal) Collected: 12/11/20 0524    Specimen: Blood Updated: 12/11/20 0716     Procalcitonin 0.03 ng/mL     Narrative:      As a Marker for Sepsis (Non-Neonates):   1. <0.5 ng/mL represents a low risk of severe sepsis and/or septic shock.  1. >2 ng/mL represents a high risk of severe sepsis and/or septic shock.    As a Marker for Lower Respiratory Tract Infections that require antibiotic therapy:  PCT on Admission     Antibiotic Therapy             6-12 Hrs later  > 0.5                Strongly Recommended            >0.25 - <0.5         Recommended  0.1 - 0.25           Discouraged                   Remeasure/reassess PCT  <0.1                 Strongly Discouraged           "Remeasure/reassess PCT      As 28 day mortality risk marker: \"Change in Procalcitonin Result\" (> 80 % or <=80 %) if Day 0 (or Day 1) and Day 4 values are available. Refer to [email or web address] [email or web address]   Change in PCT <=80 %   A decrease of PCT levels below or equal to 80 % defines a positive change in PCT test result representing a higher risk for 28-day all-cause mortality of patients diagnosed with severe sepsis or septic shock.  Change in PCT > 80 %   A decrease of PCT levels of more than 80 % defines a negative change in PCT result representing a lower risk for 28-day all-cause mortality of patients diagnosed with severe sepsis or septic shock.                Results may be falsely decreased if patient taking Biotin.     Lactate Dehydrogenase [[id number]]  (Normal) Collected: 12/11/20 0524    Specimen: Blood Updated: 12/11/20 0704      U/L      Comment: Specimen hemolyzed.  Results may be affected.       Hemoglobin A1c [[id number]]  (Abnormal) Collected: 12/11/20 0524    Specimen: Blood Updated: 12/11/20 0655     Hemoglobin A1C 8.80 %     Narrative:      Hemoglobin A1C Ranges:    Increased Risk for Diabetes  5.7% to 6.4%  Diabetes                     >= 6.5%  Diabetic Goal                < 7.0%    SCANNED - LABS [[id number]] Resulted: 12/08/20      Updated: 12/09/20 2054    COVID-19, [facility] IN-HOUSE, NP SWAB IN TRANSPORT MEDIA 8-10 HR TAT - Swab, Nasopharynx [[id number]]  (Abnormal) Collected: 12/08/20 1831    Specimen: Swab from Nasopharynx Updated: 12/08/20 2133     COVID19 Detected    Narrative:      Testing performed by Real Time RT-PCR  This test has not been approved by the [facility] but is authorized under the Emergency Use Act (EUA)    Fact sheet for providers: https://www.fda.gov/media/392905/download    Fact sheet for patients: https://www.fda.gov/media/573569/download        Hepatic Function Panel [[id number]]  (Abnormal) Collected: 12/08/20 1351    Specimen: Blood Updated: 12/08/20 1754     " "Total Protein 7.7 g/dL      Albumin 3.30 g/dL      ALT (SGPT) 30 U/L      AST (SGOT) 24 U/L      Alkaline Phosphatase 73 U/L      Total Bilirubin 0.7 mg/dL      Bilirubin, Direct 0.2 mg/dL      Comment: Specimen hemolyzed. Results may be affected.        Bilirubin, Indirect 0.5 mg/dL     Procalcitonin [[id number]]  (Normal) Collected: 12/08/20 1713    Specimen: Blood Updated: 12/08/20 1752     Procalcitonin 0.08 ng/mL     Narrative:      As a Marker for Sepsis (Non-Neonates):   1. <0.5 ng/mL represents a low risk of severe sepsis and/or septic shock.  1. >2 ng/mL represents a high risk of severe sepsis and/or septic shock.    As a Marker for Lower Respiratory Tract Infections that require antibiotic therapy:  PCT on Admission     Antibiotic Therapy             6-12 Hrs later  > 0.5                Strongly Recommended            >0.25 - <0.5         Recommended  0.1 - 0.25           Discouraged                   Remeasure/reassess PCT  <0.1                 Strongly Discouraged          Remeasure/reassess PCT      As 28 day mortality risk marker: \"Change in Procalcitonin Result\" (> 80 % or <=80 %) if Day 0 (or Day 1) and Day 4 values are available. Refer to [email or web address]   Change in PCT <=80 %   A decrease of PCT levels below or equal to 80 % defines a positive change in PCT test result representing a higher risk for 28-day all-cause mortality of patients diagnosed with severe sepsis or septic shock.  Change in PCT > 80 %   A decrease of PCT levels of more than 80 % defines a negative change in PCT result representing a lower risk for 28-day all-cause mortality of patients diagnosed with severe sepsis or septic shock.                Results may be falsely decreased if patient taking Biotin.     Creatinine, Serum [[id number]]  (Abnormal) Collected: 12/08/20 1351    Specimen: Blood Updated: 12/08/20 1712     Creatinine 0.70 mg/dL      eGFR Non African Amer 123 mL/min/1.73     Narrative:      GFR " Normal >60  Chronic Kidney Disease <60  Kidney Failure <15      Manual Differential [[id number]]  (Abnormal) Collected: 12/08/20 1351    Specimen: Blood Updated: 12/08/20 1551     Neutrophil % 59.0 %      Lymphocyte % 12.0 %      Monocyte % 9.0 %      Eosinophil % 5.0 %      Bands %  4.0 %      Metamyelocyte % 6.0 %      Myelocyte % 4.0 %      Promyelocyte % 1.0 %      Neutrophils Absolute 9.51 10*3/mm3      Lymphocytes Absolute 1.81 10*3/mm3      Monocytes Absolute 1.36 10*3/mm3      Eosinophils Absolute 0.76 10*3/mm3      RBC Morphology Normal     WBC Morphology Normal     Platelet Morphology Normal    [place] Draw [[id number]] Collected: 12/08/20 1351    Specimen: Blood Updated: 12/08/20 1500    Narrative:      The following orders were created for panel order [place] Draw.  Procedure                               Abnormality         Status                     ---------                               -----------         ------                     Light Blue Top[[id number]]                                   Final result               Green Top (Gel)[[id number]]                                  Final result               Lavender Top[[id number]]                                     Final result               Gold Top - SST[[id number]]                                   Final result                 Please view results for these tests on the individual orders.    Light Blue Top [[id number]] Collected: 12/08/20 1351    Specimen: Blood Updated: 12/08/20 1500     Extra Tube hold for add-on     Comment: Auto resulted       Green Top (Gel) [[id number]] Collected: 12/08/20 1351    Specimen: Blood Updated: 12/08/20 1500     Extra Tube Hold for add-ons.     Comment: Auto resulted.       Lavender Top [[id number]] Collected: 12/08/20 1351    Specimen: Blood Updated: 12/08/20 1500     Extra Tube hold for add-on     Comment: Auto resulted       Gold Top - SST [[id number]] Collected: 12/08/20 1351    Specimen: Blood Updated: 12/08/20 1500      Extra Tube Hold for add-ons.     Comment: Auto resulted.       Troponin [[id number]]  (Normal) Collected: 12/08/20 1351    Specimen: Blood Updated: 12/08/20 1422     Troponin T <0.010 ng/mL     Narrative:      Troponin T Reference Range:  <= 0.03 ng/mL-   Negative for AMI  >0.03 ng/mL-     Abnormal for myocardial necrosis.  Clinicians would have to utilize clinical acumen, EKG, Troponin and serial changes to determine if it is an Acute Myocardial Infarction or myocardial injury due to an underlying chronic condition.       Results may be falsely decreased if patient taking Biotin.      BNP [[id number]]  (Normal) Collected: 12/08/20 1351    Specimen: Blood Updated: 12/08/20 1420     proBNP 35.5 pg/mL     Narrative:      Among patients with dyspnea, NT-proBNP is highly sensitive for the detection of acute congestive heart failure. In addition NT-proBNP of <300 pg/ml effectively rules out acute congestive heart failure with 99% negative predictive value.    Results may be falsely decreased if patient taking Biotin.      Lactic Acid, Plasma [[id number]]  (Normal) Collected: 12/08/20 1351    Specimen: Blood Updated: 12/08/20 1419     Lactate 1.8 mmol/L         Imaging Results (Most Recent)     Procedure Component Value Units Date/Time    XR Chest 1 View [[id number]] Collected: 12/08/20 1401     Updated: 12/08/20 1426    Narrative:      Chest x-ray single view.       CLINICAL INDICATION: Shortness of breath. Triage protocol. Covid  +    COMPARISON: March 29, 2017.    FINDINGS: Cardiac silhouette is normal in size. Pulmonary  vascularity is unremarkable.     Bilateral somewhat peripheral appearing infiltrative changes  strongly suggesting Covid pneumonitis.      Impression:      Bilateral somewhat peripheral appearing infiltrative  changes, pneumonitis. The distribution  suggests COVID   pneumonitis.    Electronically signed by:  Carlos Alberto Aguiar MD  12/8/2020 2:25 PM CST  Workstation: [id number]          Chief Complaint on Day of  "Discharge: minimal short of breath upon walking but with good oxygen saturation    Hospital Course:  The patient is a 45 y.o. male who presented to [facility] with shortness of breath along with cough and hypoxemia, 88 % at room air; now at room air, 95-96 %. Patient is eager to go home; will continue with vitamin D3, zinc, and vitamin C along with melatonin for the next week    Condition on Discharge:  stable    Physical Exam on Discharge:  /83 (BP Location: Left arm, Patient Position: Lying)   Pulse 85   Temp 96.3 °F (35.7 °C) (Oral)   Resp 16   Ht 177.8 cm (70\")   Wt 121 kg (266 lb 12.8 oz)   SpO2 96%   BMI 38.28 kg/m²   Physical Exam  Constitutional:       Appearance: Normal appearance.   HENT:      Head: Normocephalic and atraumatic.      Nose: Nose normal.      Mouth/Throat:      Mouth: Mucous membranes are moist.   Eyes:      Extraocular Movements: Extraocular movements intact.   Neck:      Musculoskeletal: Normal range of motion and neck supple.   Cardiovascular:      Rate and Rhythm: Normal rate and regular rhythm.      Pulses: Normal pulses.   Pulmonary:      Effort: Pulmonary effort is normal.      Breath sounds: Normal breath sounds.   Abdominal:      General: Abdomen is flat.      Palpations: Abdomen is soft.   Musculoskeletal: Normal range of motion.   Skin:     General: Skin is warm.   Neurological:      General: No focal deficit present.      Mental Status: He is alert and oriented to person, place, and time.   Psychiatric:         Mood and Affect: Mood normal.         Behavior: Behavior normal.           Discharge Disposition:    Home or Self Care    Discharge Medications:     Your medication list      START taking these medications      Instructions Last Dose Given Next Dose Due   ascorbic acid 1000 MG tablet  Commonly known as: VITAMIN C      Take 4 tablets by mouth Every 4 (Four) Hours While Awake for 7 days.       cholecalciferol 25 MCG (1000 UT) tablet  Commonly " known as: VITAMIN D3  Start taking on: December 14, 2020      Take 10 tablets by mouth Daily for 10 days.       melatonin 3 MG tablet      Take 3 tablets by mouth Every Night for 10 days.       zinc sulfate 220 (50 Zn) MG capsule  Commonly known as: ZINCATE      Take 1 capsule by mouth 2 (Two) Times a Day for 10 days.          CONTINUE taking these medications      Instructions Last Dose Given Next Dose Due   atorvastatin 40 MG tablet  Commonly known as: Lipitor      Take 1 tablet by mouth Every Night.       gabapentin 800 MG tablet  Commonly known as: NEURONTIN      Take 800 mg by mouth 3 (Three) Times a Day.       losartan 50 MG tablet  Commonly known as: COZAAR      Take 1 tablet by mouth Every Night.       oxyCODONE-acetaminophen  MG per tablet  Commonly known as: PERCOCET      Take 1 tablet by mouth Every 6 (Six) Hours.       Ozempic (0.25 or 0.5 MG/DOSE) 2 MG/1.5ML solution pen-injector  Generic drug: Semaglutide(0.25 or 0.5MG/DOS)      Inject 0.5 mg under the skin into the appropriate area as directed 1 (One) Time Per Week.       Pen Needles 31G X 8 MM misc      use as indicated 4 times daily.       sertraline 100 MG tablet  Commonly known as: ZOLOFT      TAKE 1 TABLET BY MOUTH DAILY       tiZANidine 4 MG tablet  Commonly known as: ZANAFLEX      Take 1 tablet by mouth Every 8 (Eight) Hours As Needed for Muscle Spasms.       Toujeo Max SoloStar 300 UNIT/ML solution pen-injector injection  Generic drug: Insulin Glargine (2 Unit Dial)      Inject 20 Units under the skin into the appropriate area as directed Daily.          STOP taking these medications    butalbital-acetaminophen-caffeine -40 MG per tablet  Commonly known as: FIORICET, ESGIC        dexamethasone 1 MG tablet  Commonly known as: DECADRON        diphenoxylate-atropine 2.5-0.025 MG per tablet  Commonly known as: LOMOTIL        ondansetron 4 MG tablet  Commonly known as: Zofran        promethazine 25 MG tablet  Commonly known as:  PHENERGAN              Where to Get Your Medications      These medications were sent to [facility] - [place], [place] - [address] - [phone]  - [phone]  [address] [address]    Phone: [phone]   · ascorbic acid 1000 MG tablet  · cholecalciferol 25 MCG (1000 UT) tablet  · melatonin 3 MG tablet  · zinc sulfate 220 (50 Zn) MG capsule          Discharge Diet:   Diet Instructions     As tolerated diabetic               Activity at Discharge:   Activity Instructions     As tolerated               Discharge Care Plan/Instructions: see chart    Follow-up Appointments:   Future Appointments   Date Time Provider Department Center   12/29/2020  8:00 AM Ronak Nieves APRN [facility] None   2/9/2021  8:00 AM Efrem Deng MD [facility]       Test Results Pending at Discharge:   Pending Labs     Order Current Status    Blood Culture - Blood, Arm, Left Preliminary result    Blood Culture - Blood, Arm, Right Preliminary result          Leandro Gonzalez MD    Time: 38 min

## 2020-12-13 NOTE — OUTREACH NOTE
Prep Survey      Responses   Orthodoxy facility patient discharged from?  [facility]   Is LACE score < 7 ?  No   Eligibility  [facility]   Date of Admission  12/08/20   Date of Discharge  12/13/20   Discharge Disposition  Home or Self Care   Discharge diagnosis  acute hypoxic resp failure & PNA s/t COVID-19, T2DM   Does the patient have one of the following disease processes/diagnoses(primary or secondary)?  COVID-19   Does the patient have Home health ordered?  No   Is there a DME ordered?  No   Prep survey completed?  Yes          Kaity Morris RN

## 2020-12-13 NOTE — PLAN OF CARE
Goal Outcome Evaluation:  Plan of Care Reviewed With: patient  Progress: no change  Outcome Summary: vss, pt on room air, pt states that he is feeling much better today, c/o pain (chronic) that has been controlled with prn medication that he takes at home as well, no other complaints at this time

## 2020-12-14 ENCOUNTER — TRANSITIONAL CARE MANAGEMENT TELEPHONE ENCOUNTER (OUTPATIENT)
Dept: CALL CENTER | Facility: HOSPITAL | Age: 45
End: 2020-12-14

## 2020-12-14 LAB
GLUCOSE BLDC GLUCOMTR-MCNC: 229 MG/DL (ref 70–130)
GLUCOSE BLDC GLUCOMTR-MCNC: 364 MG/DL (ref 70–130)

## 2020-12-14 NOTE — PAYOR COMM NOTE
"Rosalinda Goncalves  [facility]  P: [phone]  F: [phone]    [facility]#[id number]    Trevor Harkins (45 y.o. Male)     Date of Birth Social Security Number Address Home Phone MRN    [id number]  [address] [place] [address] [phone] [id number]    [employer] Status          [place]        Admission Date Admission Type Admitting Provider Attending Provider Department, Room/Bed    12/8/20 Emergency Sumeet Zimmerman MD  [address], 427/1    Discharge Date Discharge Disposition Discharge Destination        12/13/2020 Home or Self Care              Attending Provider: (none)   Allergies: Ambien [Zolpidem Tartrate]    Isolation: Enh Drop/Con   Infection: COVID (confirmed) (12/08/20)   Code Status: Prior    Ht: 177.8 cm (70\")   Wt: 121 kg (266 lb 12.8 oz)    Admission Cmt: None   Principal Problem: None                Active Insurance as of 12/8/2020     Primary Coverage     Payor Plan Insurance Group Employer/Plan Group    [employer]HEM BLUE CROSS AN[employer]M BLUE CROSS BLUE SHIELD PPO [id number]     Payor Plan Address Payor Plan Phone Number Payor Plan Fax Number Effective Dates    [id number] [phone]  6/1/2020 - None Entered    [place] [address]       Subscriber Name Subscriber Birth Date Member ID       TREVOR HARKINS 1975 [id number]                 Emergency Contacts      (Rel.) Home Phone Work Phone Mobile Phone    Verónica Harkins (Spouse) [phone]               Discharge Summary      Leandro Gonzalez MD at 12/13/20 1100              [facility]  DISCHARGE SUMMARY       Date of Admission: 12/8/2020  Date of Discharge:  12/13/2020  Primary Care Physician: Efrem Deng MD    Presenting Problem/History of Present Illness:  Acute respiratory failure with hypoxia (CMS/HCC) [J96.01]  Pneumonia due to COVID-19 virus [U07.1, " J12.89]       Final Discharge Diagnoses:    Acute respiratory failure with hypoxemia     Due to below; resolved; breathing at room air     Bilateral viral pneumonia     Caused by below; resolving; follow up with PCP in the next week     COVID 19 infection     will continue with zinc, vitamin C and D3 and melatonin     Completed remdesevir last night     Hyponatremia      resolved     Hyperglycemia on DM type 2     Marked elevated likely due to steroids use in the setting of uncontrolled DM with A1C 8.8     will stop steroids; continue with home meds     Chronic medical problems     Essential hypertension     Hyperlipidemia      Consults:     Consults     No orders found from 11/9/2020 to 12/9/2020.          Procedures Performed: Bone               Pertinent Test Results:   Lab Results (most recent)     Procedure Component Value Units Date/Time    Ferritin [[id number]]  (Abnormal) Collected: 12/13/20 0703    Specimen: Blood Updated: 12/13/20 1059     Ferritin 1,247.00 ng/mL     Narrative:      Results may be falsely decreased if patient taking Biotin.      Comprehensive Metabolic Panel [[id number]]  (Abnormal) Collected: 12/13/20 0703    Specimen: Blood Updated: 12/13/20 1053     Glucose 234 mg/dL      BUN 17 mg/dL      Creatinine 0.65 mg/dL      Sodium 133 mmol/L      Potassium 4.2 mmol/L      Chloride 100 mmol/L      CO2 21.0 mmol/L      Calcium 8.6 mg/dL      Total Protein 6.3 g/dL      Albumin 3.00 g/dL      ALT (SGPT) 41 U/L      AST (SGOT) 18 U/L      Alkaline Phosphatase 107 U/L      Total Bilirubin 0.4 mg/dL      eGFR Non African Amer 133 mL/min/1.73      Globulin 3.3 gm/dL      A/G Ratio 0.9 g/dL      BUN/Creatinine Ratio 26.2     Anion Gap 12.0 mmol/L     Narrative:      GFR Normal >60  Chronic Kidney Disease <60  Kidney Failure <15      CK [[id number]]  (Abnormal) Collected: 12/13/20 0703    Specimen: Blood Updated: 12/13/20 1053     Creatine Kinase 15 U/L     C-reactive Protein [[id number]]  (Normal)  Collected: 12/13/20 0703    Specimen: Blood Updated: 12/13/20 1053     C-Reactive Protein 0.20 mg/dL     Lactate Dehydrogenase [[id number]]  (Normal) Collected: 12/13/20 0703    Specimen: Blood Updated: 12/13/20 1053      U/L     Bilirubin, Direct [[id number]]  (Normal) Collected: 12/13/20 0703    Specimen: Blood Updated: 12/13/20 1053     Bilirubin, Direct <0.2 mg/dL     D-dimer, Quantitative [[id number]]  (Normal) Collected: 12/13/20 0703    Specimen: Blood Updated: 12/13/20 0922     D-Dimer, Quantitative 430 ng/mL (FEU)     Narrative:      Dimer values <500 ng/ml FEU are FDA approved as aid in diagnosis of deep venous thrombosis and pulmonary embolism.  This test should not be used in an exclusion strategy with pretest probability alone.    A recent guideline regarding diagnosis for pulmonary thromboembolism recommends an adjusted exclusion criterion of age x 10 ng/ml FEU for patients >50 years of age (Madhuri Intern Med 2015; 163: 701-711).      Fibrinogen [[id number]]  (Normal) Collected: 12/13/20 0703    Specimen: Blood Updated: 12/13/20 0921     Fibrinogen 445 mg/dL     CBC & Differential [[id number]]  (Abnormal) Collected: 12/13/20 0703    Specimen: Blood Updated: 12/13/20 0908    Narrative:      The following orders were created for panel order CBC & Differential.  Procedure                               Abnormality         Status                     ---------                               -----------         ------                     CBC Auto Differential[[id number]]        Abnormal            Final result                 Please view results for these tests on the individual orders.    CBC Auto Differential [[id number]]  (Abnormal) Collected: 12/13/20 0703    Specimen: Blood Updated: 12/13/20 0908     WBC 12.83 10*3/mm3      RBC 4.77 10*6/mm3      Hemoglobin 13.6 g/dL      Hematocrit 39.7 %      MCV 83.2 fL      MCH 28.5 pg      MCHC 34.3 g/dL      RDW 13.0 %      RDW-SD 39.3 fl      MPV 9.5 fL      Platelets  430 10*3/mm3      Neutrophil % 64.3 %      Lymphocyte % 24.5 %      Monocyte % 7.7 %      Eosinophil % 0.6 %      Basophil % 0.2 %      Immature Grans % 2.7 %      Neutrophils, Absolute 8.25 10*3/mm3      Lymphocytes, Absolute 3.14 10*3/mm3      Monocytes, Absolute 0.99 10*3/mm3      Eosinophils, Absolute 0.08 10*3/mm3      Basophils, Absolute 0.03 10*3/mm3      Immature Grans, Absolute 0.34 10*3/mm3      nRBC 0.0 /100 WBC     POC Glucose Once [[id number]]  (Abnormal) Collected: 12/12/20 1916    Specimen: Blood Updated: 12/12/20 2059     Glucose 351 mg/dL      Comment: RN NotifiedOperator: [id number] MESSI Candelario ID: [id number]       POC Glucose Once [[id number]]  (Abnormal) Collected: 12/12/20 1048    Specimen: Blood Updated: 12/12/20 2058     Glucose 315 mg/dL      Comment: RN NotifiedOperator: [id number] KATE Marr ID: [id number]       Blood Culture - Blood, Arm, Left [[id number]] Collected: 12/08/20 1713    Specimen: Blood from Arm, Left Updated: 12/12/20 1730     Blood Culture No growth at 4 days    Blood Culture - Blood, Arm, Right [[id number]] Collected: 12/08/20 1443    Specimen: Blood from Arm, Right Updated: 12/12/20 1500     Blood Culture No growth at 4 days    CBC & Differential [[id number]]  (Abnormal) Collected: 12/12/20 0720    Specimen: Blood Updated: 12/12/20 1015    Narrative:      The following orders were created for panel order CBC & Differential.  Procedure                               Abnormality         Status                     ---------                               -----------         ------                     Scan Slide[[id number]]                                                                  CBC Auto Differential[[id number]]        Abnormal            Final result                 Please view results for these tests on the individual orders.    Comprehensive Metabolic Panel [[id number]]  (Abnormal) Collected: 12/12/20 0720    Specimen: Blood Updated: 12/12/20 0815      Glucose 257 mg/dL      BUN 15 mg/dL      Creatinine 0.58 mg/dL      Sodium 128 mmol/L      Potassium 4.1 mmol/L      Chloride 95 mmol/L      CO2 24.0 mmol/L      Calcium 9.1 mg/dL      Total Protein 7.2 g/dL      Albumin 3.40 g/dL      ALT (SGPT) 37 U/L      AST (SGOT) 23 U/L      Alkaline Phosphatase 81 U/L      Total Bilirubin 0.6 mg/dL      eGFR Non African Amer >150 mL/min/1.73      Globulin 3.8 gm/dL      A/G Ratio 0.9 g/dL      BUN/Creatinine Ratio 25.9     Anion Gap 9.0 mmol/L     Narrative:      GFR Normal >60  Chronic Kidney Disease <60  Kidney Failure <15      CK [[id number]]  (Abnormal) Collected: 12/12/20 0720    Specimen: Blood Updated: 12/12/20 0815     Creatine Kinase 14 U/L     C-reactive Protein [[id number]]  (Normal) Collected: 12/12/20 0720    Specimen: Blood Updated: 12/12/20 0815     C-Reactive Protein 0.29 mg/dL     Bilirubin, Direct [[id number]]  (Normal) Collected: 12/12/20 0720    Specimen: Blood Updated: 12/12/20 0815     Bilirubin, Direct 0.2 mg/dL     Ferritin [[id number]]  (Abnormal) Collected: 12/12/20 0720    Specimen: Blood Updated: 12/12/20 0812     Ferritin 1,618.00 ng/mL     Narrative:      Results may be falsely decreased if patient taking Biotin.      CBC Auto Differential [[id number]]  (Abnormal) Collected: 12/12/20 0720    Specimen: Blood Updated: 12/12/20 0744     WBC 13.69 10*3/mm3      RBC 5.09 10*6/mm3      Hemoglobin 14.7 g/dL      Hematocrit 41.7 %      MCV 81.9 fL      MCH 28.9 pg      MCHC 35.3 g/dL      RDW 13.1 %      RDW-SD 38.6 fl      MPV 8.9 fL      Platelets 509 10*3/mm3      Neutrophil % 61.4 %      Lymphocyte % 25.8 %      Monocyte % 8.1 %      Eosinophil % 0.6 %      Basophil % 0.3 %      Immature Grans % 3.8 %      Neutrophils, Absolute 8.41 10*3/mm3      Lymphocytes, Absolute 3.53 10*3/mm3      Monocytes, Absolute 1.11 10*3/mm3      Eosinophils, Absolute 0.08 10*3/mm3      Basophils, Absolute 0.04 10*3/mm3      Immature Grans, Absolute 0.52 10*3/mm3      nRBC 0.0  "/100 WBC     Glucose, Random [[id number]]  (Abnormal) Collected: 12/11/20 2035    Specimen: Blood Updated: 12/11/20 2111     Glucose 381 mg/dL     D-dimer, Quantitative [[id number]]  (Abnormal) Collected: 12/11/20 0524    Specimen: Blood Updated: 12/11/20 0721     D-Dimer, Quantitative 777 ng/mL (FEU)     Narrative:      Dimer values <500 ng/ml FEU are FDA approved as aid in diagnosis of deep venous thrombosis and pulmonary embolism.  This test should not be used in an exclusion strategy with pretest probability alone.    A recent guideline regarding diagnosis for pulmonary thromboembolism recommends an adjusted exclusion criterion of age x 10 ng/ml FEU for patients >50 years of age (Madhuri Intern Med 2015; 163: 701-711).      Fibrinogen [[id number]]  (Normal) Collected: 12/11/20 0524    Specimen: Blood Updated: 12/11/20 0721     Fibrinogen 506 mg/dL     Procalcitonin [[id number]]  (Normal) Collected: 12/11/20 0524    Specimen: Blood Updated: 12/11/20 0716     Procalcitonin 0.03 ng/mL     Narrative:      As a Marker for Sepsis (Non-Neonates):   1. <0.5 ng/mL represents a low risk of severe sepsis and/or septic shock.  1. >2 ng/mL represents a high risk of severe sepsis and/or septic shock.    As a Marker for Lower Respiratory Tract Infections that require antibiotic therapy:  PCT on Admission     Antibiotic Therapy             6-12 Hrs later  > 0.5                Strongly Recommended            >0.25 - <0.5         Recommended  0.1 - 0.25           Discouraged                   Remeasure/reassess PCT  <0.1                 Strongly Discouraged          Remeasure/reassess PCT      As 28 day mortality risk marker: \"Change in Procalcitonin Result\" (> 80 % or <=80 %) if Day 0 (or Day 1) and Day 4 values are available. Refer to http://www.[employer]-pct-calculator.com/   Change in PCT <=80 %   A decrease of PCT levels below or equal to 80 % defines a positive change in PCT test result representing a higher risk for 28-day all-cause " mortality of patients diagnosed with severe sepsis or septic shock.  Change in PCT > 80 %   A decrease of PCT levels of more than 80 % defines a negative change in PCT result representing a lower risk for 28-day all-cause mortality of patients diagnosed with severe sepsis or septic shock.                Results may be falsely decreased if patient taking Biotin.     Lactate Dehydrogenase [[id number]]  (Normal) Collected: 12/11/20 0524    Specimen: Blood Updated: 12/11/20 0704      U/L      Comment: Specimen hemolyzed.  Results may be affected.       Hemoglobin A1c [[id number]]  (Abnormal) Collected: 12/11/20 0524    Specimen: Blood Updated: 12/11/20 0655     Hemoglobin A1C 8.80 %     Narrative:      Hemoglobin A1C Ranges:    Increased Risk for Diabetes  5.7% to 6.4%  Diabetes                     >= 6.5%  Diabetic Goal                < 7.0%    SCANNED - LABS [[id number]] Resulted: 12/08/20      Updated: 12/09/20 2054    COVID-19, [facility] IN-HOUSE, NP SWAB IN TRANSPORT MEDIA 8-10 HR TAT - Swab, Nasopharynx [[id number]]  (Abnormal) Collected: 12/08/20 1831    Specimen: Swab from Nasopharynx Updated: 12/08/20 2133     COVID19 Detected    Narrative:      Testing performed by Real Time RT-PCR  This test has not been approved by the [facility] but is authorized under the Emergency Use Act (EUA)    Fact sheet for providers: https://www.fda.gov/media/425563/download    Fact sheet for patients: https://www.fda.gov/media/461087/download        Hepatic Function Panel [[id number]]  (Abnormal) Collected: 12/08/20 1351    Specimen: Blood Updated: 12/08/20 1754     Total Protein 7.7 g/dL      Albumin 3.30 g/dL      ALT (SGPT) 30 U/L      AST (SGOT) 24 U/L      Alkaline Phosphatase 73 U/L      Total Bilirubin 0.7 mg/dL      Bilirubin, Direct 0.2 mg/dL      Comment: Specimen hemolyzed. Results may be affected.        Bilirubin, Indirect 0.5 mg/dL     Procalcitonin [[id number]]  (Normal) Collected: 12/08/20 1713    Specimen: Blood Updated:  "12/08/20 1752     Procalcitonin 0.08 ng/mL     Narrative:      As a Marker for Sepsis (Non-Neonates):   1. <0.5 ng/mL represents a low risk of severe sepsis and/or septic shock.  1. >2 ng/mL represents a high risk of severe sepsis and/or septic shock.    As a Marker for Lower Respiratory Tract Infections that require antibiotic therapy:  PCT on Admission     Antibiotic Therapy             6-12 Hrs later  > 0.5                Strongly Recommended            >0.25 - <0.5         Recommended  0.1 - 0.25           Discouraged                   Remeasure/reassess PCT  <0.1                 Strongly Discouraged          Remeasure/reassess PCT      As 28 day mortality risk marker: \"Change in Procalcitonin Result\" (> 80 % or <=80 %) if Day 0 (or Day 1) and Day 4 values are available. Refer to http://www.[facility]bypct-calculator.com/   Change in PCT <=80 %   A decrease of PCT levels below or equal to 80 % defines a positive change in PCT test result representing a higher risk for 28-day all-cause mortality of patients diagnosed with severe sepsis or septic shock.  Change in PCT > 80 %   A decrease of PCT levels of more than 80 % defines a negative change in PCT result representing a lower risk for 28-day all-cause mortality of patients diagnosed with severe sepsis or septic shock.                Results may be falsely decreased if patient taking Biotin.     Creatinine, Serum [[id number]]  (Abnormal) Collected: 12/08/20 1351    Specimen: Blood Updated: 12/08/20 1712     Creatinine 0.70 mg/dL      eGFR Non African Amer 123 mL/min/1.73     Narrative:      GFR Normal >60  Chronic Kidney Disease <60  Kidney Failure <15      Manual Differential [[id number]]  (Abnormal) Collected: 12/08/20 1351    Specimen: Blood Updated: 12/08/20 1551     Neutrophil % 59.0 %      Lymphocyte % 12.0 %      Monocyte % 9.0 %      Eosinophil % 5.0 %      Bands %  4.0 %      Metamyelocyte % 6.0 %      Myelocyte % 4.0 %      Promyelocyte % 1.0 %      " Neutrophils Absolute 9.51 10*3/mm3      Lymphocytes Absolute 1.81 10*3/mm3      Monocytes Absolute 1.36 10*3/mm3      Eosinophils Absolute 0.76 10*3/mm3      RBC Morphology Normal     WBC Morphology Normal     Platelet Morphology Normal    [place] Draw [[id number]] Collected: 12/08/20 1351    Specimen: Blood Updated: 12/08/20 1500    Narrative:      The following orders were created for panel order [place] Draw.  Procedure                               Abnormality         Status                     ---------                               -----------         ------                     Light Blue Top[[id number]]                                   Final result               Green Top (Gel)[[id number]]                                  Final result               Lavender Top[[id number]]                                     Final result               Gold Top - SST[[id number]]                                   Final result                 Please view results for these tests on the individual orders.    Light Blue Top [[id number]] Collected: 12/08/20 1351    Specimen: Blood Updated: 12/08/20 1500     Extra Tube hold for add-on     Comment: Auto resulted       Green Top (Gel) [[id number]] Collected: 12/08/20 1351    Specimen: Blood Updated: 12/08/20 1500     Extra Tube Hold for add-ons.     Comment: Auto resulted.       Lavender Top [[id number]] Collected: 12/08/20 1351    Specimen: Blood Updated: 12/08/20 1500     Extra Tube hold for add-on     Comment: Auto resulted       Gold Top - SST [[id number]] Collected: 12/08/20 1351    Specimen: Blood Updated: 12/08/20 1500     Extra Tube Hold for add-ons.     Comment: Auto resulted.       Troponin [[id number]]  (Normal) Collected: 12/08/20 1351    Specimen: Blood Updated: 12/08/20 1422     Troponin T <0.010 ng/mL     Narrative:      Troponin T Reference Range:  <= 0.03 ng/mL-   Negative for AMI  >0.03 ng/mL-     Abnormal for myocardial necrosis.  Clinicians would have to utilize clinical  acumen, EKG, Troponin and serial changes to determine if it is an Acute Myocardial Infarction or myocardial injury due to an underlying chronic condition.       Results may be falsely decreased if patient taking Biotin.      BNP [[id number]]  (Normal) Collected: 12/08/20 1351    Specimen: Blood Updated: 12/08/20 1420     proBNP 35.5 pg/mL     Narrative:      Among patients with dyspnea, NT-proBNP is highly sensitive for the detection of acute congestive heart failure. In addition NT-proBNP of <300 pg/ml effectively rules out acute congestive heart failure with 99% negative predictive value.    Results may be falsely decreased if patient taking Biotin.      Lactic Acid, Plasma [[id number]]  (Normal) Collected: 12/08/20 1351    Specimen: Blood Updated: 12/08/20 1419     Lactate 1.8 mmol/L         Imaging Results (Most Recent)     Procedure Component Value Units Date/Time    XR Chest 1 View [[id number]] Collected: 12/08/20 1401     Updated: 12/08/20 1426    Narrative:      Chest x-ray single view.       CLINICAL INDICATION: Shortness of breath. Triage protocol. Covid  +    COMPARISON: March 29, 2017.    FINDINGS: Cardiac silhouette is normal in size. Pulmonary  vascularity is unremarkable.     Bilateral somewhat peripheral appearing infiltrative changes  strongly suggesting Covid pneumonitis.      Impression:      Bilateral somewhat peripheral appearing infiltrative  changes, pneumonitis. The distribution  suggests COVID   pneumonitis.    Electronically signed by:  Carlos Alberto Aguiar MD  12/8/2020 2:25 PM [facility]  Workstation: [id number]          Chief Complaint on Day of Discharge: minimal short of breath upon walking but with good oxygen saturation    Hospital Course:  The patient is a 45 y.o. male who presented to [facility] with shortness of breath along with cough and hypoxemia, 88 % at room air; now at room air, 95-96 %. Patient is eager to go home; will continue with vitamin D3, zinc, and vitamin C along with  "melatonin for the next week    Condition on Discharge:  stable    Physical Exam on Discharge:  /83 (BP Location: Left arm, Patient Position: Lying)   Pulse 85   Temp 96.3 °F (35.7 °C) (Oral)   Resp 16   Ht 177.8 cm (70\")   Wt 121 kg (266 lb 12.8 oz)   SpO2 96%   BMI 38.28 kg/m²   Physical Exam  Constitutional:       Appearance: Normal appearance.   HENT:      Head: Normocephalic and atraumatic.      Nose: Nose normal.      Mouth/Throat:      Mouth: Mucous membranes are moist.   Eyes:      Extraocular Movements: Extraocular movements intact.   Neck:      Musculoskeletal: Normal range of motion and neck supple.   Cardiovascular:      Rate and Rhythm: Normal rate and regular rhythm.      Pulses: Normal pulses.   Pulmonary:      Effort: Pulmonary effort is normal.      Breath sounds: Normal breath sounds.   Abdominal:      General: Abdomen is flat.      Palpations: Abdomen is soft.   Musculoskeletal: Normal range of motion.   Skin:     General: Skin is warm.   Neurological:      General: No focal deficit present.      Mental Status: He is alert and oriented to person, place, and time.   Psychiatric:         Mood and Affect: Mood normal.         Behavior: Behavior normal.           Discharge Disposition:    Home or Self Care    Discharge Medications:     Your medication list      START taking these medications      Instructions Last Dose Given Next Dose Due   ascorbic acid 1000 MG tablet  Commonly known as: VITAMIN C      Take 4 tablets by mouth Every 4 (Four) Hours While Awake for 7 days.       cholecalciferol 25 MCG (1000 UT) tablet  Commonly known as: VITAMIN D3  Start taking on: December 14, 2020      Take 10 tablets by mouth Daily for 10 days.       melatonin 3 MG tablet      Take 3 tablets by mouth Every Night for 10 days.       zinc sulfate 220 (50 Zn) MG capsule  Commonly known as: ZINCATE      Take 1 capsule by mouth 2 (Two) Times a Day for 10 days.          CONTINUE taking these medications      " Instructions Last Dose Given Next Dose Due   atorvastatin 40 MG tablet  Commonly known as: Lipitor      Take 1 tablet by mouth Every Night.       gabapentin 800 MG tablet  Commonly known as: NEURONTIN      Take 800 mg by mouth 3 (Three) Times a Day.       losartan 50 MG tablet  Commonly known as: COZAAR      Take 1 tablet by mouth Every Night.       oxyCODONE-acetaminophen  MG per tablet  Commonly known as: PERCOCET      Take 1 tablet by mouth Every 6 (Six) Hours.       Ozempic (0.25 or 0.5 MG/DOSE) 2 MG/1.5ML solution pen-injector  Generic drug: Semaglutide(0.25 or 0.5MG/DOS)      Inject 0.5 mg under the skin into the appropriate area as directed 1 (One) Time Per Week.       Pen Needles 31G X 8 MM misc      use as indicated 4 times daily.       sertraline 100 MG tablet  Commonly known as: ZOLOFT      TAKE 1 TABLET BY MOUTH DAILY       tiZANidine 4 MG tablet  Commonly known as: ZANAFLEX      Take 1 tablet by mouth Every 8 (Eight) Hours As Needed for Muscle Spasms.       Toujeo Max SoloStar 300 UNIT/ML solution pen-injector injection  Generic drug: Insulin Glargine (2 Unit Dial)      Inject 20 Units under the skin into the appropriate area as directed Daily.          STOP taking these medications    butalbital-acetaminophen-caffeine -40 MG per tablet  Commonly known as: FIORICET, ESGIC        dexamethasone 1 MG tablet  Commonly known as: DECADRON        diphenoxylate-atropine 2.5-0.025 MG per tablet  Commonly known as: LOMOTIL        ondansetron 4 MG tablet  Commonly known as: Zofran        promethazine 25 MG tablet  Commonly known as: PHENERGAN              Where to Get Your Medications      These medications were sent to [facility] - [place], [place] - [address] [phone]  - [phone]   [address] [facility] [address]    Phone: [phone]   · ascorbic acid 1000 MG tablet  · cholecalciferol 25 MCG (1000 UT) tablet  · melatonin 3 MG tablet  · zinc sulfate 220 (50 Zn) MG  capsule          Discharge Diet:   Diet Instructions     As tolerated diabetic               Activity at Discharge:   Activity Instructions     As tolerated               Discharge Care Plan/Instructions: see chart    Follow-up Appointments:   Future Appointments   Date Time Provider Department Center   12/29/2020  8:00 AM Ronak Nieves APRN [facility] None   2/9/2021  8:00 AM Efrem Deng MD [facility]       Test Results Pending at Discharge:   Pending Labs     Order Current Status    Blood Culture - Blood, Arm, Left Preliminary result    Blood Culture - Blood, Arm, Right Preliminary result          Benjie Aburto MD    Time: 38 min                Electronically signed by Benjie Aburto MD at 12/13/20 1115       Discharge Order (From admission, onward)     Start     Ordered    12/13/20 1055  Discharge patient  Once     Expected Discharge Date: 12/13/20    Expected Discharge Time: Morning    Discharge Disposition: Home or Self Care    Physician of Record for Attribution - Please select from Treatment Team: BENJIE ABURTO [[id number]]    Review needed by CMO to determine Physician of Record: No       Question Answer Comment   Physician of Record for Attribution - Please select from Treatment Team BENJIE ABURTO    Review needed by CMO to determine Physician of Record No        12/13/20 1051

## 2020-12-14 NOTE — OUTREACH NOTE
Call Center TCM Note      Responses   [facility] patient discharged from?  [place]   Does the patient have one of the following disease processes/diagnoses(primary or secondary)?  COVID-19   COVID-19 underlying condition?  None   TCM attempt successful?  Yes   Call start time  1729   Call end time  1731   Discharge diagnosis  acute hypoxic resp failure & PNA s/t COVID-19, T2DM   Meds reviewed with patient/caregiver?  Yes   Is the patient having any side effects they believe may be caused by any medication additions or changes?  No   Does the patient have all medications ordered at discharge?  Yes   Is the patient taking all medications as directed (includes completed medication regime)?  Yes   Does the patient have a primary care provider?   Yes   Comments regarding PCP  f/u with Dr. Deng on 12/21   Does the patient have an appointment with their PCP or specialist within 7 days of discharge?  Yes   Has the patient kept scheduled appointments due by today?  N/A   Psychosocial issues?  No   Did the patient receive a copy of their discharge instructions?  Yes   Did the patient receive a copy of COVID-19 specific instructions?  Yes   Nursing interventions  Reviewed instructions with patient   What is the patient's perception of their health status since discharge?  Improving   Does the patient have any of the following symptoms?  None   Nursing Interventions  Nurse provided patient education   Pulse Ox monitoring  None   Is the patient/caregiver able to teach back the hierarchy of who to call/visit for symptoms/problems? PCP, Specialist, Home health nurse, Urgent Care, ED, 911  Yes   TCM call completed?  Yes   Wrap up additional comments  States he is doing well, he is not checking his O2 sats but states he knows they have been good, no questions or concerns at this time.          Ana Gonzáles RN    12/14/2020, 17:31 EST

## 2020-12-15 ENCOUNTER — READMISSION MANAGEMENT (OUTPATIENT)
Dept: CALL CENTER | Facility: HOSPITAL | Age: 45
End: 2020-12-15

## 2020-12-15 NOTE — OUTREACH NOTE
COVID-19 Week 1 Survey      Responses   [facility] patient discharged from?  [place]   Does the patient have one of the following disease processes/diagnoses(primary or secondary)?  COVID-19   COVID-19 underlying condition?  None   Call Number  Call 2   Week 1 Call successful?  Yes   Call start time  1407   Call end time  1409   Discharge diagnosis  acute hypoxic resp failure & PNA s/t COVID-19, T2DM   Is the patient taking all medications as directed (includes completed medication regime)?  Yes   Has the patient kept scheduled appointments due by today?  N/A   Pulse Ox monitoring  None   If the patient is a current smoker, are they able to teach back resources for cessation?  Not a smoker   Is the patient/caregiver able to teach back the hierarchy of who to call/visit for symptoms/problems? PCP, Specialist, Home health nurse, Urgent Care, ED, 911  Yes   COVID-19 call completed?  Yes   Wrap up additional comments  He is doing well and feeling better          Bella Rao RN

## 2020-12-16 ENCOUNTER — TELEPHONE (OUTPATIENT)
Dept: FAMILY MEDICINE CLINIC | Facility: CLINIC | Age: 45
End: 2020-12-16

## 2020-12-16 ENCOUNTER — READMISSION MANAGEMENT (OUTPATIENT)
Dept: CALL CENTER | Facility: HOSPITAL | Age: 45
End: 2020-12-16

## 2020-12-16 NOTE — TELEPHONE ENCOUNTER
Caller: PATIENT        Best call back number: [phone]    Patient is needing: PATIENT WAS ADMITTED TO [facility] WITH DOUBLE PNEUMONIA, SINCE 11/29/20 HE HAS HAD WHITE INSIDE OF HIS MOUTH AND HIS MOUTH IS PAINFUL.       PATIENT THOUGHT THERE WAS A RINSE AND SWALLOW MEDICATION TO CLEAR UP THE THRUSH. HOME HEALTH NURSE TOLD HIM THAT THEY HAVE SEEN QUITE A FEW PATIENTS WHO HAVE BEEN RELEASED FROM HOSPITAL HAVING THRUSH AND TO INFORM HIS PCP.    PLEASE ADVISE. PATIENT STILL USING [facility] [employer]. HE ALSO SAID HE IS QUARANTINED UNTIL Sunday, 12/20/20.

## 2020-12-16 NOTE — OUTREACH NOTE
COVID-19 Week 1 Survey      Responses   [facility] patient discharged from?  [place]   Does the patient have one of the following disease processes/diagnoses(primary or secondary)?  COVID-19   COVID-19 underlying condition?  None   Call Number  Call 3   Week 1 Call successful?  Yes   Call start time  1623   Call end time  1631   Discharge diagnosis  acute hypoxic resp failure & PNA s/t COVID-19, T2DM   Psychosocial issues?  No   Comments  Pt c/o pain in mouth, feels swollen, white paste coating tongue he reports mouth feeling dry and hurts to eat/drink. Advised him to call PCP to have evaluation on whether he may have Thrush or not. He remains afebrile. He has decreased appetite r/t mouth pain.    Does the patient have any of the following symptoms?  Shortness of breath, Loss of taste/smell [SOA with exertion remains but has improved. Hurts to breathe deep, ]   Pulse Ox monitoring  None   Is the patient/caregiver able to teach back steps to recovery at home?  Set small, achievable goals for return to baseline health, Rest and rebuild strength, gradually increase activity   Is the patient/caregiver able to teach back the hierarchy of who to call/visit for symptoms/problems? PCP, Specialist, Home health nurse, Urgent Care, ED, 911  Yes   COVID-19 call completed?  Yes   Wrap up additional comments  Advised that he will need to change toothbrush again if he does have thrush.          Lucero Btaeman, RN

## 2020-12-16 NOTE — TELEPHONE ENCOUNTER
Called patient to let him know we did not have a provider in office and we would take care of this first thing in the morning.

## 2020-12-17 RX ORDER — FLUCONAZOLE 150 MG/1
TABLET ORAL
Qty: 2 TABLET | Refills: 1 | Status: SHIPPED | OUTPATIENT
Start: 2020-12-17 | End: 2020-12-21

## 2020-12-17 RX ORDER — DIPHENHYDRAMINE, LIDOCAINE, NYSTATIN
10 KIT ORAL 4 TIMES DAILY
Qty: 60 ML | Refills: 0 | Status: SHIPPED | OUTPATIENT
Start: 2020-12-17 | End: 2020-12-21 | Stop reason: SDUPTHER

## 2020-12-17 NOTE — TELEPHONE ENCOUNTER
Send Diflucan 151 now and 1 Monday plus nystatin wash 4 ounces 2 teaspoons flush for couple minutes swallow 4 times a day

## 2020-12-21 ENCOUNTER — OFFICE VISIT (OUTPATIENT)
Dept: FAMILY MEDICINE CLINIC | Facility: CLINIC | Age: 45
End: 2020-12-21

## 2020-12-21 VITALS
BODY MASS INDEX: 34.79 KG/M2 | TEMPERATURE: 97.3 F | DIASTOLIC BLOOD PRESSURE: 98 MMHG | SYSTOLIC BLOOD PRESSURE: 140 MMHG | HEART RATE: 106 BPM | WEIGHT: 243 LBS | OXYGEN SATURATION: 94 % | RESPIRATION RATE: 16 BRPM | HEIGHT: 70 IN

## 2020-12-21 DIAGNOSIS — R53.83 FATIGUE, UNSPECIFIED TYPE: ICD-10-CM

## 2020-12-21 DIAGNOSIS — Z20.828 VIRAL DISEASE EXPOSURE: ICD-10-CM

## 2020-12-21 DIAGNOSIS — I10 ESSENTIAL HYPERTENSION: Primary | ICD-10-CM

## 2020-12-21 PROCEDURE — 99214 OFFICE O/P EST MOD 30 MIN: CPT | Performed by: FAMILY MEDICINE

## 2020-12-21 RX ORDER — DIPHENHYDRAMINE, LIDOCAINE, NYSTATIN
10 KIT ORAL 4 TIMES DAILY
Qty: 60 ML | Refills: 1 | Status: SHIPPED | OUTPATIENT
Start: 2020-12-21 | End: 2021-01-04

## 2020-12-21 NOTE — PROGRESS NOTES
Subjective   Trevor Hernandez is a 45 y.o. male.     45-year-old male diabetic with recent hospitalization for bilateral Covid pneumonia    Pneumonia  He complains of chest tightness, cough and difficulty breathing. This is a new problem. The current episode started 1 to 4 weeks ago. The problem occurs intermittently. The problem has been waxing and waning. The cough is non-productive. Associated symptoms include appetite change, chest pain, dyspnea on exertion and malaise/fatigue. Pertinent negatives include no fever. His symptoms are aggravated by any activity (Recent Covid pneumonia). His symptoms are alleviated by nothing. He reports significant improvement on treatment. Past medical history comments: Obesity and diabetes.       The following portions of the patient's history were reviewed and updated as appropriate: allergies, current medications, past family history, past medical history, past social history, past surgical history and problem list.    Review of Systems   Constitutional: Positive for appetite change and malaise/fatigue. Negative for fever.   Respiratory: Positive for cough.    Cardiovascular: Positive for chest pain and dyspnea on exertion.       Objective   Physical Exam  Nursing note reviewed. Exam conducted with a chaperone present.   Constitutional:       Appearance: He is obese.   Cardiovascular:      Rate and Rhythm: Normal rate and regular rhythm.      Pulses: Normal pulses.      Heart sounds: Normal heart sounds.   Pulmonary:      Effort: Pulmonary effort is normal.      Breath sounds: Normal breath sounds. No wheezing or rhonchi.   Musculoskeletal:      Right lower leg: No edema.      Left lower leg: No edema.   Neurological:      General: No focal deficit present.      Mental Status: He is alert and oriented to person, place, and time.   Psychiatric:         Mood and Affect: Mood normal.         Behavior: Behavior normal.         Thought Content: Thought content normal.          Judgment: Judgment normal.         Assessment/Plan   Diagnoses and all orders for this visit:    1. Essential hypertension (Primary)  -     Basic Metabolic Panel    2. Fatigue, unspecified type  -     CBC & Differential  -     Basic Metabolic Panel    3. Viral disease exposure    Other orders  -     nystatin susp + lidocaine viscous (MAGIC MOUTHWASH) oral suspension; Swish and swallow 10 mL 4 (Four) Times a Day.  Dispense: 60 mL; Refill: 1       Plan above-improved overall-warned if sats less than 93 increasing chest discomfort or breathlessness or fever returns to go back to the ER immediately

## 2020-12-22 ENCOUNTER — READMISSION MANAGEMENT (OUTPATIENT)
Dept: CALL CENTER | Facility: HOSPITAL | Age: 45
End: 2020-12-22

## 2020-12-22 NOTE — OUTREACH NOTE
COVID-19 Week 2 Survey      Responses   [facility] patient discharged from?  [place]   Does the patient have one of the following disease processes/diagnoses(primary or secondary)?  COVID-19   COVID-19 underlying condition?  None   Call Number  Call 1   COVID-19 Week 2: Call 1 attempt successful?  No   Discharge diagnosis  acute hypoxic resp failure & PNA s/t COVID-19, T2DM          Yuli Rose LPN

## 2020-12-23 LAB
BASOPHILS # BLD AUTO: 0.05 10*3/MM3 (ref 0–0.2)
BASOPHILS NFR BLD AUTO: 0.7 % (ref 0–1.5)
BUN SERPL-MCNC: 11 MG/DL (ref 6–20)
BUN/CREAT SERPL: 14.3 (ref 7–25)
CALCIUM SERPL-MCNC: 9.4 MG/DL (ref 8.6–10.5)
CHLORIDE SERPL-SCNC: 95 MMOL/L (ref 98–107)
CO2 SERPL-SCNC: 24.4 MMOL/L (ref 22–29)
CREAT SERPL-MCNC: 0.77 MG/DL (ref 0.76–1.27)
EOSINOPHIL # BLD AUTO: 0.16 10*3/MM3 (ref 0–0.4)
EOSINOPHIL NFR BLD AUTO: 2.1 % (ref 0.3–6.2)
ERYTHROCYTE [DISTWIDTH] IN BLOOD BY AUTOMATED COUNT: 13.5 % (ref 12.3–15.4)
GLUCOSE SERPL-MCNC: 333 MG/DL (ref 65–99)
HCT VFR BLD AUTO: 43.9 % (ref 37.5–51)
HGB BLD-MCNC: 14.4 G/DL (ref 13–17.7)
IMM GRANULOCYTES # BLD AUTO: 0.03 10*3/MM3 (ref 0–0.05)
IMM GRANULOCYTES NFR BLD AUTO: 0.4 % (ref 0–0.5)
LYMPHOCYTES # BLD AUTO: 1.84 10*3/MM3 (ref 0.7–3.1)
LYMPHOCYTES NFR BLD AUTO: 24.3 % (ref 19.6–45.3)
MCH RBC QN AUTO: 29 PG (ref 26.6–33)
MCHC RBC AUTO-ENTMCNC: 32.8 G/DL (ref 31.5–35.7)
MCV RBC AUTO: 88.5 FL (ref 79–97)
MONOCYTES # BLD AUTO: 0.67 10*3/MM3 (ref 0.1–0.9)
MONOCYTES NFR BLD AUTO: 8.8 % (ref 5–12)
NEUTROPHILS # BLD AUTO: 4.83 10*3/MM3 (ref 1.7–7)
NEUTROPHILS NFR BLD AUTO: 63.7 % (ref 42.7–76)
NRBC BLD AUTO-RTO: 0 /100 WBC (ref 0–0.2)
PLATELET # BLD AUTO: 282 10*3/MM3 (ref 140–450)
POTASSIUM SERPL-SCNC: 4.5 MMOL/L (ref 3.5–5.2)
RBC # BLD AUTO: 4.96 10*6/MM3 (ref 4.14–5.8)
SODIUM SERPL-SCNC: 137 MMOL/L (ref 136–145)
WBC # BLD AUTO: 7.58 10*3/MM3 (ref 3.4–10.8)

## 2020-12-26 ENCOUNTER — READMISSION MANAGEMENT (OUTPATIENT)
Dept: CALL CENTER | Facility: HOSPITAL | Age: 45
End: 2020-12-26

## 2020-12-26 NOTE — OUTREACH NOTE
COVID-19 Week 1 Survey      Responses   [facility] patient discharged from?  [place]   Does the patient have one of the following disease processes/diagnoses(primary or secondary)?  COVID-19   COVID-19 underlying condition?  None   Call start time  1358   Call end time  1403   Discharge diagnosis  acute hypoxic resp failure & PNA s/t COVID-19, T2DM   Meds reviewed with patient/caregiver?  Yes   Is the patient having any side effects they believe may be caused by any medication additions or changes?  No   Does the patient have all medications ordered at discharge?  Yes   Is the patient taking all medications as directed (includes completed medication regime)?  Yes   Does the patient have a primary care provider?   Yes   Does the patient have an appointment with their PCP or specialist within 7 days of discharge?  Yes   Has the patient kept scheduled appointments due by today?  Yes   Has home health visited the patient within 72 hours of discharge?  N/A   Psychosocial issues?  No   Comments  states thrush has resolved   Did the patient receive a copy of their discharge instructions?  Yes   Did the patient receive a copy of COVID-19 specific instructions?  Yes   Nursing interventions  Reviewed instructions with patient   What is the patient's perception of their health status since discharge?  Improving   Does the patient have any of the following symptoms?  Loss of taste/smell   Nursing Interventions  Nurse provided patient education   Pulse Ox monitoring  Intermittent   Pulse Ox device source  Patient   O2 Sat comments  93-98% on RA, pt is active and has no SOB   O2 Sat: education provided  Sat levels, Monitoring frequency   Is the patient/caregiver able to teach back steps to recovery at home?  Set small, achievable goals for return to baseline health, Rest and rebuild strength, gradually increase activity, Eat a well-balance diet   Is the patient/caregiver able to teach back the hierarchy of who to  call/visit for symptoms/problems? PCP, Specialist, Home health nurse, Urgent Care, ED, 911  Yes   Revoked  No further contact(revokes)-requires comment   Is the patient interested in additional calls from an ambulatory ?  NOTE:  applies to high risk patients requiring additional follow-up.  No   Graduated/Revoked comments  pt states has been out duck hunting without SOB, goals met, no more calls needed   Wrap up additional comments  thrush has resolved          Mariana Wright RN

## 2020-12-29 ENCOUNTER — TELEMEDICINE (OUTPATIENT)
Dept: ENDOCRINOLOGY | Facility: CLINIC | Age: 45
End: 2020-12-29

## 2020-12-29 DIAGNOSIS — E11.65 TYPE 2 DIABETES MELLITUS WITH HYPERGLYCEMIA, WITH LONG-TERM CURRENT USE OF INSULIN (HCC): Primary | ICD-10-CM

## 2020-12-29 DIAGNOSIS — I10 ESSENTIAL HYPERTENSION: ICD-10-CM

## 2020-12-29 DIAGNOSIS — E78.2 MIXED HYPERLIPIDEMIA: ICD-10-CM

## 2020-12-29 DIAGNOSIS — Z79.4 TYPE 2 DIABETES MELLITUS WITH HYPERGLYCEMIA, WITH LONG-TERM CURRENT USE OF INSULIN (HCC): Primary | ICD-10-CM

## 2020-12-29 PROCEDURE — 99214 OFFICE O/P EST MOD 30 MIN: CPT | Performed by: NURSE PRACTITIONER

## 2020-12-29 RX ORDER — SEMAGLUTIDE 1.34 MG/ML
0.5 INJECTION, SOLUTION SUBCUTANEOUS WEEKLY
Qty: 3 PEN | Refills: 11 | Status: SHIPPED | OUTPATIENT
Start: 2020-12-29 | End: 2021-05-10 | Stop reason: DRUGHIGH

## 2020-12-29 NOTE — PROGRESS NOTES
Subjective    Trevor Hernandez is a 45 y.o. male. he is here today for follow-up.    History of Present Illness       You have chosen to receive care through a telehealth visit.  Do you consent to use a video/audio connection for your medical care today? Yes          TELEHEALTH VIDEO VISIT     This a video visit due to [facility][note 3] current guidelines for social distancing due to the COVID 19 pandemic      Referring provider Aixa Deng MD      45 year old male presents for follow up     Reason diabetes mellitus type 2    He was recently admitted for pneumonia related to COVID-19    He states his blood sugars were fantastic until he became sick and then they were up to the 300s    He finished his steroid on Sunday    He states his blood glucose levels are already trending down      Duration diagnosed in 2016    Timing of diabetes is constant    Quality improved control             Severity - moderate due to hyperglycemia         Severity (Complication/Hospitalizations)        Secondary Macrovascular-- no CAD, no CVA,no PAD         Secondary Microvascular--- neuropathy , no diabetic retinopathy, no renal disease         Context--- yearly physical             Diabetes Regimen--none         Lab Results   Component Value Date    HGBA1C 8.80 (H) 12/11/2020                Blood Glucose Readings     States this  a.m. was 130    He is no longer seeing the 300s  No lows         Diet-  low-carb        Associated Signs/Symptoms       Hyperglycemic Symptoms: None       Hypoglycemic Episodes: none        Dad has diabetes     The following portions of the patient's history were reviewed and updated as appropriate:   Past Medical History:   Diagnosis Date   • Chronic back pain    • Diabetes mellitus (CMS/HCC)    • Diabetic neuropathy (CMS/HCC)    • History of migraine headaches    • Hypercholesterolemia    • Hypercholesterolemia    • Hypertension    • Obesity    • Pneumonia due to COVID-19 virus 12/8/2020     Past Surgical  History:   Procedure Laterality Date   • ANTERIOR LUMBAR EXPOSURE N/A 11/27/2017    Procedure: ANTERIOR LUMBAR EXPOSURE;  Surgeon: Devon Guan DO;  Location:  [facility] OR;  Service:    • BACK SURGERY      X 2   • LUMBAR FUSION N/A 11/27/2017    Procedure: ANTERIOR DECOMPRESSION, ANTERIOR LUMBAR INTERBODY FUSION WITH INSTRUMENTATION L5-S1;  Surgeon: SHERI Lunsford MD;  Location:  [facility] OR;  Service:    • LUMBAR LAMINECTOMY WITH FUSION N/A 11/29/2017    Procedure: POSTERIOR SPINAL FUSION WITH INSTRUMENTATION L5-S1;  Surgeon: SHERI Lunsford MD;  Location:  [facility] OR;  Service:      Family History   Problem Relation Age of Onset   • Diabetes Other    • Heart disease Other    • Diabetes Father    • Heart disease Father        Current Outpatient Medications   Medication Sig Dispense Refill   • atorvastatin (Lipitor) 40 MG tablet Take 1 tablet by mouth Every Night. 90 tablet 3   • gabapentin (NEURONTIN) 800 MG tablet Take 800 mg by mouth 3 (Three) Times a Day.     • Insulin Glargine, 2 Unit Dial, (Toujeo Max SoloStar) 300 UNIT/ML solution pen-injector injection Inject 20 Units under the skin into the appropriate area as directed Daily. 2 pen 11   • Insulin Pen Needle (PEN NEEDLES) 31G X 8 MM misc use as indicated 4 times daily. 120 each 11   • losartan (COZAAR) 50 MG tablet Take 1 tablet by mouth Every Night. 90 tablet 3   • nystatin susp + lidocaine viscous (MAGIC MOUTHWASH) oral suspension Swish and swallow 10 mL 4 (Four) Times a Day. 60 mL 1   • oxyCODONE-acetaminophen (PERCOCET)  MG per tablet Take 1 tablet by mouth Every 6 (Six) Hours.     • Semaglutide,0.25 or 0.5MG/DOS, (Ozempic, 0.25 or 0.5 MG/DOSE,) 2 MG/1.5ML solution pen-injector Inject 0.5 mg under the skin into the appropriate area as directed 1 (One) Time Per Week. 3 pen 11   • sertraline (ZOLOFT) 100 MG tablet TAKE 1 TABLET BY MOUTH DAILY 90 tablet 3   • tiZANidine (ZANAFLEX) 4 MG tablet Take 1 tablet by mouth Every 8 (Eight) Hours As Needed  for Muscle Spasms.       No current facility-administered medications for this visit.      Allergies   Allergen Reactions   • Ambien [Zolpidem Tartrate] Confusion     Social History     Socioeconomic History   • Marital status:      Spouse name: Not on file   • Number of children: Not on file   • Years of education: Not on file   • Highest education level: Not on file   Tobacco Use   • Smoking status: Never Smoker   • Smokeless tobacco: Former User     Types: Chew   Substance and Sexual Activity   • Alcohol use: No   • Drug use: No   • Sexual activity: Defer       Review of Systems  Review of Systems   Constitutional: Positive for fatigue. Negative for activity change, appetite change and diaphoresis.   HENT: Negative for facial swelling, sneezing, sore throat, tinnitus, trouble swallowing and voice change.    Eyes: Negative for photophobia, pain, discharge, redness, itching and visual disturbance.   Respiratory: Negative for apnea, cough, choking, chest tightness and shortness of breath.    Cardiovascular: Negative for chest pain, palpitations and leg swelling.   Gastrointestinal: Negative for abdominal distention, abdominal pain, constipation, diarrhea, nausea and vomiting.   Endocrine: Negative for cold intolerance, heat intolerance, polydipsia, polyphagia and polyuria.   Genitourinary: Negative for difficulty urinating, dysuria, frequency, hematuria and urgency.   Musculoskeletal: Negative for arthralgias, back pain, gait problem, joint swelling, myalgias, neck pain and neck stiffness.   Skin: Negative for color change, pallor, rash and wound.   Neurological: Negative for dizziness, tremors, weakness, light-headedness, numbness and headaches.   Hematological: Negative for adenopathy. Does not bruise/bleed easily.   Psychiatric/Behavioral: Negative for behavioral problems, confusion and sleep disturbance.        Objective    There were no vitals taken for this visit.  Physical Exam  Constitutional:        Appearance: Normal appearance.   HENT:      Head: Normocephalic and atraumatic.      Right Ear: External ear normal.      Left Ear: External ear normal.   Eyes:      Conjunctiva/sclera: Conjunctivae normal.      Pupils: Pupils are equal, round, and reactive to light.   Neck:      Musculoskeletal: Normal range of motion.   Pulmonary:      Effort: Pulmonary effort is normal.   Skin:     Coloration: Skin is not pale.   Neurological:      General: No focal deficit present.      Mental Status: He is alert.   Psychiatric:         Mood and Affect: Mood normal.         Thought Content: Thought content normal.         Judgment: Judgment normal.         Lab Review  Glucose (mg/dL)   Date Value   12/13/2020 234 (H)   12/12/2020 257 (H)   12/11/2020 381 (H)     Sodium (mmol/L)   Date Value   12/21/2020 137   12/13/2020 133 (L)   12/12/2020 128 (L)   12/11/2020 125 (L)     Potassium (mmol/L)   Date Value   12/21/2020 4.5   12/13/2020 4.2   12/12/2020 4.1   12/11/2020 4.0     Chloride (mmol/L)   Date Value   12/21/2020 95 (L)   12/13/2020 100   12/12/2020 95 (L)   12/11/2020 94 (L)     CO2 (mmol/L)   Date Value   12/13/2020 21.0 (L)   12/12/2020 24.0   12/11/2020 21.0 (L)     Total CO2 (mmol/L)   Date Value   12/21/2020 24.4     BUN (mg/dL)   Date Value   12/21/2020 11   12/13/2020 17   12/12/2020 15   12/11/2020 19     Creatinine (mg/dL)   Date Value   12/21/2020 0.77   12/13/2020 0.65 (L)   12/12/2020 0.58 (L)   12/11/2020 0.62 (L)     Hemoglobin A1C (%)   Date Value   12/11/2020 8.80 (H)   08/10/2020 10.70 (H)   10/11/2019 9.20 (H)   06/27/2019 10.40 (H)   03/29/2017 9.87 (H)   02/10/2017 9.62 (H)     Triglycerides (mg/dL)   Date Value   08/10/2020 930 (H)   10/11/2019 184 (H)   06/27/2019 599 (H)   02/10/2017 503 (H)     LDL Cholesterol  (mg/dL)   Date Value   08/10/2020 CANCELED   10/11/2019 96   06/27/2019 CANCELED   02/10/2017 77       Assessment/Plan      1. Type 2 diabetes mellitus with hyperglycemia, with long-term  current use of insulin (CMS/[facility])    2. Mixed hyperlipidemia    3. Essential hypertension    .    Medications prescribed:  Outpatient Encounter Medications as of 12/29/2020   Medication Sig Dispense Refill   • atorvastatin (Lipitor) 40 MG tablet Take 1 tablet by mouth Every Night. 90 tablet 3   • gabapentin (NEURONTIN) 800 MG tablet Take 800 mg by mouth 3 (Three) Times a Day.     • Insulin Glargine, 2 Unit Dial, (Toujeo Max SoloStar) 300 UNIT/ML solution pen-injector injection Inject 20 Units under the skin into the appropriate area as directed Daily. 2 pen 11   • Insulin Pen Needle (PEN NEEDLES) 31G X 8 MM misc use as indicated 4 times daily. 120 each 11   • losartan (COZAAR) 50 MG tablet Take 1 tablet by mouth Every Night. 90 tablet 3   • nystatin susp + lidocaine viscous (MAGIC MOUTHWASH) oral suspension Swish and swallow 10 mL 4 (Four) Times a Day. 60 mL 1   • oxyCODONE-acetaminophen (PERCOCET)  MG per tablet Take 1 tablet by mouth Every 6 (Six) Hours.     • Semaglutide,0.25 or 0.5MG/DOS, (Ozempic, 0.25 or 0.5 MG/DOSE,) 2 MG/1.5ML solution pen-injector Inject 0.5 mg under the skin into the appropriate area as directed 1 (One) Time Per Week. 3 pen 11   • sertraline (ZOLOFT) 100 MG tablet TAKE 1 TABLET BY MOUTH DAILY 90 tablet 3   • tiZANidine (ZANAFLEX) 4 MG tablet Take 1 tablet by mouth Every 8 (Eight) Hours As Needed for Muscle Spasms.     • [DISCONTINUED] Semaglutide,0.25 or 0.5MG/DOS, (Ozempic, 0.25 or 0.5 MG/DOSE,) 2 MG/1.5ML solution pen-injector Inject 0.5 mg under the skin into the appropriate area as directed 1 (One) Time Per Week. 3 pen 11     No facility-administered encounter medications on file as of 12/29/2020.        Orders placed during this encounter include:  No orders of the defined types were placed in this encounter.    Glycemic Management     Diabetes mellitus type 2 not controled due to hyperglycemia         Lab Results   Component Value Date    HGBA1C 8.80 (H) 12/11/2020           Glipizide stopped      Metformin stopped due to side effects        trulicity not affordable           Ozempic  0.5 mg once a week continue taking--refilled today     Side effects are nausea     If you are eating and become nauseated stop eatiing      If abdominal pain or vomiting stop medication      Toujeo 20 uints once daily continue taking     Am sugar should be 80 to 130     If you have an am sugar less than 80 decrease to 16 units         Lymjev sliding scale if needed for meals --has been giving for each meal since being sick        Blood sugar     Insulin     151-200           2 units  201-250           4 untis  251-300           6 nits  Above 301       8 units                 Rules of 15 discussed for hypoglycemia            Goals for sugar     Fasting and before meals 80 to 130     2 hours after meals 180 or less        Aim for 60 grams of carbohydrate per meal     Aim for 15 grams of carbohydrate per snack         Lipid Management        Taking Lipitor 40 mg one at night                   Lab Results   Component Value Date     CHOL 182 02/10/2017                Lab Results   Component Value Date     TRIG 930 (H) 08/10/2020                Lab Results   Component Value Date     HDL 28 (L) 08/10/2020                Lab Results   Component Value Date     LDL CANCELED 08/10/2020                Lab Results   Component Value Date     VLDL CANCELED 08/10/2020                    Lab Results   Component Value Date     LDLHDL   02/10/2017         Comment:         Unable to calculate         Blood Pressure Management     Taking Cozaar 50 mg one daily         Microvascular Complication Monitoring        Last eye exam August 18, 2020  No DR         Neuropathy      Taking gabapentin 800 mg tid                   Lab Results   Component Value Date     MICROALBUR 4.4 08/10/2020         Bone Health                  Lab Results   Component Value Date     CALCIUM 9.1 08/10/2020                  Other Diabetes Related  Aspects                  Lab Results   Component Value Date     [id number] 804 11/29/2017      Thyroid health                      Lab Results   Component Value Date     TSH 0.836 08/10/2020            4. Follow-up: Return in about 3 months (around 3/29/2021) for Recheck.          We spent 25 minutes including reviewing the patients electronic chart, reviewing medications, reviewing labs, active problems    I provided advice regarding diabetes management, dietary changes, adjustments of medication, self titration of insulin, refilled medications, ordering labs, future appointments    Patient was advised to contact the office if there are unanswered questions, trouble with blood glucose management, or any concerns

## 2021-01-04 ENCOUNTER — OFFICE VISIT (OUTPATIENT)
Dept: FAMILY MEDICINE CLINIC | Facility: CLINIC | Age: 46
End: 2021-01-04

## 2021-01-04 VITALS
DIASTOLIC BLOOD PRESSURE: 92 MMHG | TEMPERATURE: 97.8 F | BODY MASS INDEX: 35.5 KG/M2 | SYSTOLIC BLOOD PRESSURE: 140 MMHG | HEART RATE: 92 BPM | WEIGHT: 248 LBS | HEIGHT: 70 IN | OXYGEN SATURATION: 97 % | RESPIRATION RATE: 18 BRPM

## 2021-01-04 DIAGNOSIS — Z20.828 VIRAL DISEASE EXPOSURE: Primary | ICD-10-CM

## 2021-01-04 PROCEDURE — 99213 OFFICE O/P EST LOW 20 MIN: CPT | Performed by: FAMILY MEDICINE

## 2021-01-04 NOTE — PROGRESS NOTES
Subjective   Trevor Hernandez is a 45 y.o. male.     45-year-old male getting over COVID-19 pneumonia for follow-up-fatigue improving    Fatigue  This is a new problem. The current episode started more than 1 month ago. The problem has been gradually improving. Associated symptoms include fatigue. Pertinent negatives include no chest pain. Exacerbated by: Bilateral COVID-19 pneumonia. Treatments tried: Posthospitalization follow-up. The treatment provided significant relief.       The following portions of the patient's history were reviewed and updated as appropriate: allergies, current medications, past family history, past medical history, past social history, past surgical history and problem list.    Review of Systems   Constitutional: Positive for fatigue.   Respiratory: Negative for shortness of breath.    Cardiovascular: Negative for chest pain and leg swelling.       Objective   Physical Exam  Vitals signs and nursing note reviewed.   Constitutional:       Appearance: He is obese.   Cardiovascular:      Rate and Rhythm: Normal rate and regular rhythm.      Pulses: Normal pulses.   Pulmonary:      Effort: Pulmonary effort is normal.      Breath sounds: Normal breath sounds.   Musculoskeletal:      Right lower leg: No edema.      Left lower leg: No edema.   Neurological:      General: No focal deficit present.      Mental Status: He is alert and oriented to person, place, and time.   Psychiatric:         Mood and Affect: Mood normal.         Behavior: Behavior normal.         Thought Content: Thought content normal.         Judgment: Judgment normal.         Assessment/Plan   Diagnoses and all orders for this visit:    1. Viral disease exposure (Primary)  -     CBC & Differential  -     Comprehensive Metabolic Panel  -     C-reactive Protein  -     Sedimentation Rate       And-needs flu shot-above

## 2021-01-05 LAB
ALBUMIN SERPL-MCNC: 4.2 G/DL (ref 3.5–5.2)
ALBUMIN/GLOB SERPL: 1.5 G/DL
ALP SERPL-CCNC: 82 U/L (ref 39–117)
ALT SERPL-CCNC: 27 U/L (ref 1–41)
AST SERPL-CCNC: 17 U/L (ref 1–40)
BASOPHILS # BLD AUTO: 0.07 10*3/MM3 (ref 0–0.2)
BASOPHILS NFR BLD AUTO: 1 % (ref 0–1.5)
BILIRUB SERPL-MCNC: 0.6 MG/DL (ref 0–1.2)
BUN SERPL-MCNC: 11 MG/DL (ref 6–20)
BUN/CREAT SERPL: 13.3 (ref 7–25)
CALCIUM SERPL-MCNC: 9.3 MG/DL (ref 8.6–10.5)
CHLORIDE SERPL-SCNC: 100 MMOL/L (ref 98–107)
CO2 SERPL-SCNC: 27.5 MMOL/L (ref 22–29)
CREAT SERPL-MCNC: 0.83 MG/DL (ref 0.76–1.27)
CRP SERPL-MCNC: 1.79 MG/DL (ref 0–0.5)
EOSINOPHIL # BLD AUTO: 0.23 10*3/MM3 (ref 0–0.4)
EOSINOPHIL NFR BLD AUTO: 3.4 % (ref 0.3–6.2)
ERYTHROCYTE [DISTWIDTH] IN BLOOD BY AUTOMATED COUNT: 13.3 % (ref 12.3–15.4)
ERYTHROCYTE [SEDIMENTATION RATE] IN BLOOD BY WESTERGREN METHOD: NORMAL MM/HR
GLOBULIN SER CALC-MCNC: 2.8 GM/DL
GLUCOSE SERPL-MCNC: 231 MG/DL (ref 65–99)
HCT VFR BLD AUTO: 44.1 % (ref 37.5–51)
HGB BLD-MCNC: 14.8 G/DL (ref 13–17.7)
IMM GRANULOCYTES # BLD AUTO: 0.05 10*3/MM3 (ref 0–0.05)
IMM GRANULOCYTES NFR BLD AUTO: 0.7 % (ref 0–0.5)
LYMPHOCYTES # BLD AUTO: 2.72 10*3/MM3 (ref 0.7–3.1)
LYMPHOCYTES NFR BLD AUTO: 40.2 % (ref 19.6–45.3)
MCH RBC QN AUTO: 28.8 PG (ref 26.6–33)
MCHC RBC AUTO-ENTMCNC: 33.6 G/DL (ref 31.5–35.7)
MCV RBC AUTO: 85.8 FL (ref 79–97)
MONOCYTES # BLD AUTO: 0.76 10*3/MM3 (ref 0.1–0.9)
MONOCYTES NFR BLD AUTO: 11.2 % (ref 5–12)
NEUTROPHILS # BLD AUTO: 2.93 10*3/MM3 (ref 1.7–7)
NEUTROPHILS NFR BLD AUTO: 43.5 % (ref 42.7–76)
NRBC BLD AUTO-RTO: 0 /100 WBC (ref 0–0.2)
PLATELET # BLD AUTO: 313 10*3/MM3 (ref 140–450)
POTASSIUM SERPL-SCNC: 5.2 MMOL/L (ref 3.5–5.2)
PROT SERPL-MCNC: 7 G/DL (ref 6–8.5)
RBC # BLD AUTO: 5.14 10*6/MM3 (ref 4.14–5.8)
REQUEST PROBLEM: NORMAL
SODIUM SERPL-SCNC: 138 MMOL/L (ref 136–145)
WBC # BLD AUTO: 6.76 10*3/MM3 (ref 3.4–10.8)

## 2021-02-05 RX ORDER — SERTRALINE HYDROCHLORIDE 100 MG/1
TABLET, FILM COATED ORAL
Qty: 90 TABLET | Refills: 2 | Status: SHIPPED | OUTPATIENT
Start: 2021-02-05 | End: 2021-11-02

## 2021-02-08 ENCOUNTER — OFFICE VISIT (OUTPATIENT)
Dept: FAMILY MEDICINE CLINIC | Facility: CLINIC | Age: 46
End: 2021-02-08

## 2021-02-08 VITALS
HEART RATE: 85 BPM | TEMPERATURE: 97.7 F | DIASTOLIC BLOOD PRESSURE: 80 MMHG | HEIGHT: 70 IN | WEIGHT: 253.4 LBS | OXYGEN SATURATION: 98 % | BODY MASS INDEX: 36.28 KG/M2 | SYSTOLIC BLOOD PRESSURE: 112 MMHG

## 2021-02-08 DIAGNOSIS — R53.83 FATIGUE, UNSPECIFIED TYPE: Primary | ICD-10-CM

## 2021-02-08 DIAGNOSIS — R73.9 HYPERGLYCEMIA: ICD-10-CM

## 2021-02-08 DIAGNOSIS — E66.01 MORBIDLY OBESE (HCC): ICD-10-CM

## 2021-02-08 PROCEDURE — 99213 OFFICE O/P EST LOW 20 MIN: CPT | Performed by: FAMILY MEDICINE

## 2021-02-08 NOTE — PROGRESS NOTES
Subjective   Trevor Hernandez is a 45 y.o. male.     45-year-old male with recent history of COVID-19 pneumonia follow-up    Fatigue  This is a new problem. The current episode started more than 1 month ago. The problem occurs daily. The problem has been gradually improving. Associated symptoms include fatigue. Pertinent negatives include no chest pain or coughing. Associated symptoms comments: Had bilateral Covid pneumonia in December 2020. Nothing aggravates the symptoms. Treatments tried: Rest nutrition. The treatment provided mild relief.       The following portions of the patient's history were reviewed and updated as appropriate: allergies, current medications, past family history, past medical history, past social history, past surgical history and problem list.    Review of Systems   Constitutional: Positive for fatigue.   Respiratory: Negative for cough and shortness of breath.    Cardiovascular: Negative for chest pain.       Objective   Physical Exam  Vitals signs and nursing note reviewed.   Constitutional:       Appearance: He is obese.   Cardiovascular:      Rate and Rhythm: Normal rate and regular rhythm.      Pulses: Normal pulses.      Heart sounds: Normal heart sounds.   Pulmonary:      Effort: Pulmonary effort is normal.      Breath sounds: Normal breath sounds. No wheezing or rales.   Musculoskeletal:      Right lower leg: No edema.      Left lower leg: No edema.   Neurological:      General: No focal deficit present.      Mental Status: He is alert and oriented to person, place, and time.   Psychiatric:         Mood and Affect: Mood normal.         Behavior: Behavior normal.         Thought Content: Thought content normal.         Judgment: Judgment normal.         Assessment/Plan   Diagnoses and all orders for this visit:    1. Fatigue, unspecified type (Primary)    2. Morbidly obese (CMS/[facility])    3. Hyperglycemia  -     Hemoglobin A1c  -     Basic Metabolic Panel       Plan-above-will  need chest x-ray done this year-shows no signs of post Covid lung damage at this point

## 2021-02-09 LAB
BUN SERPL-MCNC: 10 MG/DL (ref 6–20)
BUN/CREAT SERPL: 12.7 (ref 7–25)
CALCIUM SERPL-MCNC: 9.7 MG/DL (ref 8.6–10.5)
CHLORIDE SERPL-SCNC: 98 MMOL/L (ref 98–107)
CO2 SERPL-SCNC: 31.1 MMOL/L (ref 22–29)
CREAT SERPL-MCNC: 0.79 MG/DL (ref 0.76–1.27)
GLUCOSE SERPL-MCNC: 152 MG/DL (ref 65–99)
HBA1C MFR BLD: 7.2 % (ref 4.8–5.6)
POTASSIUM SERPL-SCNC: 4.9 MMOL/L (ref 3.5–5.2)
SODIUM SERPL-SCNC: 137 MMOL/L (ref 136–145)

## 2021-03-22 ENCOUNTER — OFFICE VISIT (OUTPATIENT)
Dept: FAMILY MEDICINE CLINIC | Facility: CLINIC | Age: 46
End: 2021-03-22

## 2021-03-22 VITALS
OXYGEN SATURATION: 98 % | HEIGHT: 70 IN | BODY MASS INDEX: 35.65 KG/M2 | SYSTOLIC BLOOD PRESSURE: 130 MMHG | HEART RATE: 90 BPM | DIASTOLIC BLOOD PRESSURE: 92 MMHG | RESPIRATION RATE: 16 BRPM | WEIGHT: 249 LBS | TEMPERATURE: 98.2 F

## 2021-03-22 DIAGNOSIS — G89.29 CHRONIC RIGHT SHOULDER PAIN: Primary | ICD-10-CM

## 2021-03-22 DIAGNOSIS — M25.511 CHRONIC RIGHT SHOULDER PAIN: Primary | ICD-10-CM

## 2021-03-22 PROCEDURE — 20610 DRAIN/INJ JOINT/BURSA W/O US: CPT | Performed by: FAMILY MEDICINE

## 2021-03-22 RX ORDER — LIDOCAINE HYDROCHLORIDE 10 MG/ML
2 INJECTION, SOLUTION INFILTRATION; PERINEURAL
Status: COMPLETED | OUTPATIENT
Start: 2021-03-22 | End: 2021-03-22

## 2021-03-22 RX ORDER — METHYLPREDNISOLONE ACETATE 40 MG/ML
80 INJECTION, SUSPENSION INTRA-ARTICULAR; INTRALESIONAL; INTRAMUSCULAR; SOFT TISSUE
Status: COMPLETED | OUTPATIENT
Start: 2021-03-22 | End: 2021-03-22

## 2021-03-22 RX ADMIN — METHYLPREDNISOLONE ACETATE 80 MG: 40 INJECTION, SUSPENSION INTRA-ARTICULAR; INTRALESIONAL; INTRAMUSCULAR; SOFT TISSUE at 14:50

## 2021-03-22 RX ADMIN — LIDOCAINE HYDROCHLORIDE 2 ML: 10 INJECTION, SOLUTION INFILTRATION; PERINEURAL at 14:50

## 2021-03-22 NOTE — PROGRESS NOTES
Procedure   Arthrocentesis    Date/Time: 3/22/2021 2:50 PM  Performed by: Efrem Deng MD  Authorized by: Efrem Deng MD   Indications: pain   Body area: shoulder  Joint: right shoulder  Local anesthesia used: no    Anesthesia:  Local anesthesia used: no    Sedation:  Patient sedated: no    Preparation: Patient was prepped and draped in the usual sterile fashion.  Needle gauge: 25-gauge.  Approach: posterior  Aspirate amount: 0 mL  Comments: Patient had the above-mentioned medicine placed in his right shoulder through the posterior approach.  There were no complications and the patient noticed improvement.  He was advised to use heat for couple of days and protected it

## 2021-03-30 ENCOUNTER — TELEMEDICINE (OUTPATIENT)
Dept: ENDOCRINOLOGY | Facility: CLINIC | Age: 46
End: 2021-03-30

## 2021-03-30 DIAGNOSIS — Z79.4 TYPE 2 DIABETES MELLITUS WITH HYPERGLYCEMIA, WITH LONG-TERM CURRENT USE OF INSULIN (HCC): Primary | ICD-10-CM

## 2021-03-30 DIAGNOSIS — E11.65 TYPE 2 DIABETES MELLITUS WITH HYPERGLYCEMIA, WITH LONG-TERM CURRENT USE OF INSULIN (HCC): Primary | ICD-10-CM

## 2021-03-30 DIAGNOSIS — I10 ESSENTIAL HYPERTENSION: ICD-10-CM

## 2021-03-30 DIAGNOSIS — E78.2 MIXED HYPERLIPIDEMIA: ICD-10-CM

## 2021-03-30 DIAGNOSIS — E11.42 DIABETIC POLYNEUROPATHY ASSOCIATED WITH TYPE 2 DIABETES MELLITUS (HCC): ICD-10-CM

## 2021-03-30 PROCEDURE — 99214 OFFICE O/P EST MOD 30 MIN: CPT | Performed by: NURSE PRACTITIONER

## 2021-03-30 RX ORDER — SEMAGLUTIDE 1.34 MG/ML
1 INJECTION, SOLUTION SUBCUTANEOUS WEEKLY
Qty: 2 PEN | Refills: 11 | Status: SHIPPED | OUTPATIENT
Start: 2021-03-30 | End: 2022-05-06 | Stop reason: SDUPTHER

## 2021-03-30 NOTE — PROGRESS NOTES
Chief Complaint  Diabetes    Subjective          Loser Diamond Hernandez presents to [facility] ENDOCRINOLOGY  History of Present Illness       You have chosen to receive care through a telehealth visit.  Do you consent to use a video/audio connection for your medical care today? Yes            TELEHEALTH VIDEO VISIT     This a video visit due to [facility] current guidelines for social distancing due to the COVID 19 pandemic      Referring provider Aixa Deng MD       45 year old male presents for follow up     Reason diabetes mellitus type 2      Timing constant     Duration diagnosed in 2016     Quality near control          Severity - moderate due to hyperglycemia         Severity (Complication/Hospitalizations)          Secondary Macrovascular-- none         Secondary Microvascular--- neuropathy , no diabetic retinopathy, no renal disease         Context--- yearly physical             Diabetes Regimen--none         Lab Results   Component Value Date    HGBA1C 7.20 (H) 02/08/2021             Blood Glucose Readings     States at goal      Diet-  low-carb        Associated Signs/Symptoms       Hyperglycemic Symptoms: None       Hypoglycemic Episodes: none        Dad has diabetes       Review of Systems - General ROS: positive for  - fatigue    Objective   Vital Signs:   There were no vitals taken for this visit.    Physical Exam  Musculoskeletal:      Cervical back: Normal range of motion.   Neurological:      General: No focal deficit present.      Mental Status: He is alert.   Psychiatric:         Mood and Affect: Mood normal.         Thought Content: Thought content normal.         Judgment: Judgment normal.        Result Review :   The following data was reviewed by: DAMIAN Mosquera on 03/30/2021:  Common labs    Common Labsle 12/21/20 12/21/20 1/4/21 1/4/21 2/8/21 2/8/21    1309 1309 1450 1450 1447 1447   Glucose  333 (A)  231 (A)  152 (A)   BUN  11  11  10   Creatinine  0.77  0.83   0.79   eGFR Non African Am  110 (C)  101  107   eGFR  Am  133  122  129   Sodium  137 (C)  138  137   Potassium  4.5  5.2  4.9   Chloride  95 (A)  100  98   Calcium  9.4  9.3  9.7   Total Protein    7.0     Albumin    4.20     Total Bilirubin    0.6     Alkaline Phosphatase    82     AST (SGOT)    17     ALT (SGPT)    27     WBC 7.58  6.76      Hemoglobin 14.4  14.8      Hematocrit 43.9  44.1      Platelets 282  313      Hemoglobin A1C     7.20 (A)    (A) Abnormal value (C) Corrected value       Comments are available for some flowsheets but are not being displayed.                     Assessment and Plan    Diagnoses and all orders for this visit:    1. Type 2 diabetes mellitus with hyperglycemia, with long-term current use of insulin (CMS/[facility]) (Primary)    2. Mixed hyperlipidemia    3. Essential hypertension    4. Diabetic polyneuropathy associated with type 2 diabetes mellitus (CMS/[facility])    Other orders  -     Semaglutide, 1 MG/DOSE, (Ozempic, 1 MG/DOSE,) 2 MG/1.5ML solution pen-injector; Inject 1 mg under the skin into the appropriate area as directed 1 (One) Time Per Week.  Dispense: 2 pen; Refill: 11             Glycemic Management     Diabetes mellitus type 2 not controled due to hyperglycemia         Lab Results   Component Value Date    HGBA1C 7.20 (H) 02/08/2021          Glipizide stopped      Metformin stopped due to side effects        trulicity not affordable           Taking Ozempic  0.5 mg once a week ---increase to 1 mg weekly   -       Side effects are nausea     If you are eating and become nauseated stop eatiing      If abdominal pain or vomiting stop medication      Toujeo 20 uints once daily---not taking      Am sugar should be 80 to 130     If you have an am sugar less than 80 decrease to 16 units         Lymjev sliding scale if needed for meals -not using         Blood sugar     Insulin     151-200           2 units  201-250           4 untis  251-300           6 nits  Above 301       8  units                  Rules of 15 discussed for hypoglycemia            Goals for sugar     Fasting and before meals 80 to 130     2 hours after meals 180 or less        Aim for 60 grams of carbohydrate per meal     Aim for 15 grams of carbohydrate per snack         Lipid Management        Taking Lipitor 40 mg one at night           Blood Pressure Management     Taking Cozaar 50 mg one daily         Microvascular Complication Monitoring        Last eye exam August 18, 2020  No DR         Neuropathy      Taking gabapentin 800 mg tid                   Lab Results   Component Value Date     MICROALBUR 4.4 08/10/2020         Bone Health        Lab Results   Component Value Date    CALCIUM 9.7 02/08/2021                Other Diabetes Related Aspects        Lab Results   Component Value Date    [id number] 804 11/29/2017       Thyroid health            Lab Results   Component Value Date    TSH 0.836 08/10/2020                Follow Up   No follow-ups on file.  Patient was given instructions and counseling regarding his condition or for health maintenance advice. Please see specific information pulled into the AVS if appropriate.     I provided advice regarding diabetes management, dietary changes, adjustments of medication, self titration of insulin, refilled medications, ordering labs, future appointments    Patient was advised to contact the office if there are unanswered questions, trouble with blood glucose management, or any concerns

## 2021-05-10 ENCOUNTER — TELEMEDICINE (OUTPATIENT)
Dept: FAMILY MEDICINE CLINIC | Facility: CLINIC | Age: 46
End: 2021-05-10

## 2021-05-10 VITALS — HEIGHT: 70 IN | WEIGHT: 241 LBS | BODY MASS INDEX: 34.5 KG/M2

## 2021-05-10 DIAGNOSIS — U09.9 POST-COVID SYNDROME: Primary | ICD-10-CM

## 2021-05-10 PROCEDURE — 99441 PR PHYS/QHP TELEPHONE EVALUATION 5-10 MIN: CPT | Performed by: FAMILY MEDICINE

## 2021-05-10 RX ORDER — BUTALBITAL, ACETAMINOPHEN AND CAFFEINE 50; 325; 40 MG/1; MG/1; MG/1
1 TABLET ORAL EVERY 4 HOURS PRN
COMMUNITY
Start: 2021-04-15 | End: 2021-06-03

## 2021-06-03 RX ORDER — BUTALBITAL, ACETAMINOPHEN AND CAFFEINE 50; 325; 40 MG/1; MG/1; MG/1
TABLET ORAL
Qty: 10 TABLET | Refills: 0 | Status: SHIPPED | OUTPATIENT
Start: 2021-06-03 | End: 2022-03-03

## 2021-07-23 ENCOUNTER — TRANSCRIBE ORDERS (OUTPATIENT)
Dept: ADMINISTRATIVE | Facility: HOSPITAL | Age: 46
End: 2021-07-23

## 2021-07-23 DIAGNOSIS — M54.16 LUMBAR RADICULOPATHY: Primary | ICD-10-CM

## 2021-08-11 ENCOUNTER — HOSPITAL ENCOUNTER (OUTPATIENT)
Dept: MRI IMAGING | Facility: HOSPITAL | Age: 46
Discharge: HOME | End: 2021-08-11
Admitting: NURSE PRACTITIONER

## 2021-08-11 DIAGNOSIS — M54.16 LUMBAR RADICULOPATHY: ICD-10-CM

## 2021-08-11 PROCEDURE — 72148 MRI LUMBAR SPINE W/O DYE: CPT

## 2021-08-23 ENCOUNTER — TELEPHONE (OUTPATIENT)
Dept: FAMILY MEDICINE CLINIC | Facility: CLINIC | Age: 46
End: 2021-08-23

## 2021-08-23 NOTE — TELEPHONE ENCOUNTER
Your do a sugar check-[facility] will check your antibody level free at that time-I will check my antibodies to Covid to see what they are doing

## 2021-08-25 ENCOUNTER — LAB (OUTPATIENT)
Dept: FAMILY MEDICINE CLINIC | Facility: CLINIC | Age: 46
End: 2021-08-25

## 2021-08-25 DIAGNOSIS — Z79.4 TYPE 2 DIABETES MELLITUS WITH HYPERGLYCEMIA, WITH LONG-TERM CURRENT USE OF INSULIN (HCC): Primary | ICD-10-CM

## 2021-08-25 DIAGNOSIS — E11.65 TYPE 2 DIABETES MELLITUS WITH HYPERGLYCEMIA, WITH LONG-TERM CURRENT USE OF INSULIN (HCC): Primary | ICD-10-CM

## 2021-08-26 LAB
BUN SERPL-MCNC: 12 MG/DL (ref 6–20)
BUN/CREAT SERPL: 16.2 (ref 7–25)
CALCIUM SERPL-MCNC: 9.6 MG/DL (ref 8.6–10.5)
CHLORIDE SERPL-SCNC: 102 MMOL/L (ref 98–107)
CO2 SERPL-SCNC: 24.8 MMOL/L (ref 22–29)
CREAT SERPL-MCNC: 0.74 MG/DL (ref 0.76–1.27)
GLUCOSE SERPL-MCNC: 143 MG/DL (ref 65–99)
HBA1C MFR BLD: 6.5 % (ref 4.8–5.6)
POTASSIUM SERPL-SCNC: 4.8 MMOL/L (ref 3.5–5.2)
SODIUM SERPL-SCNC: 139 MMOL/L (ref 136–145)

## 2021-09-21 RX ORDER — LOSARTAN POTASSIUM 50 MG/1
50 TABLET ORAL NIGHTLY
Qty: 90 TABLET | Refills: 0 | Status: SHIPPED | OUTPATIENT
Start: 2021-09-21 | End: 2021-12-27 | Stop reason: SDUPTHER

## 2021-09-21 NOTE — TELEPHONE ENCOUNTER
Rx Refill Note  Requested Prescriptions     Pending Prescriptions Disp Refills   • losartan (COZAAR) 50 MG tablet [Pharmacy Med Name: LOSARTAN POT 50MG] 90 tablet 2     Sig: TAKE 1 TABLET BY MOUTH EVERY NIGHT.      Last office visit with prescribing clinician: 5/10/21  Next office visit with prescribing clinician: Visit date not found            Kamini Zhou MA  09/21/21, 08:41 CDT    Due CPE. Patient advised.  Will call back to schedule.

## 2021-11-02 RX ORDER — SERTRALINE HYDROCHLORIDE 100 MG/1
TABLET, FILM COATED ORAL
Qty: 30 TABLET | Refills: 0 | Status: SHIPPED | OUTPATIENT
Start: 2021-11-02 | End: 2021-12-06

## 2021-11-02 NOTE — TELEPHONE ENCOUNTER
Rx Refill Note  Requested Prescriptions     Pending Prescriptions Disp Refills   • sertraline (ZOLOFT) 100 MG tablet [Pharmacy Med Name: SERTRALINE 100MG] 90 tablet 1     Sig: TAKE 1 TABLET BY MOUTH DAILY      Last office visit with prescribing clinician: 3/22/2021      Next office visit with prescribing clinician: Visit date not found     {TIP  Please add Last Relevant Lab Date if appropriate: 08/25/2021    {TIP  Is Refill Pharmacy correct?: yes    Kamini Marquez MA  11/02/21, 09:55 CDT

## 2021-12-06 RX ORDER — SERTRALINE HYDROCHLORIDE 100 MG/1
TABLET, FILM COATED ORAL
Qty: 30 TABLET | Refills: 0 | Status: SHIPPED | OUTPATIENT
Start: 2021-12-06 | End: 2022-01-05 | Stop reason: SDUPTHER

## 2021-12-27 RX ORDER — LOSARTAN POTASSIUM 50 MG/1
50 TABLET ORAL NIGHTLY
Qty: 90 TABLET | Refills: 0 | Status: SHIPPED | OUTPATIENT
Start: 2021-12-27 | End: 2022-03-28

## 2021-12-27 NOTE — TELEPHONE ENCOUNTER
Rx Refill Note  Requested Prescriptions     Pending Prescriptions Disp Refills   • losartan (COZAAR) 50 MG tablet [Pharmacy Med Name: LOSARTAN POT 50MG] 90 tablet 0     Sig: TAKE 1 TABLET BY MOUTH EVERY NIGHT.      Last office visit with prescribing clinician: 5/10/21  Next office visit with prescribing clinician: Visit date not found            Kamini Zhou MA  12/27/21, 07:32 CST

## 2022-01-05 RX ORDER — SERTRALINE HYDROCHLORIDE 100 MG/1
TABLET, FILM COATED ORAL
Qty: 30 TABLET | Refills: 0 | Status: SHIPPED | OUTPATIENT
Start: 2022-01-05 | End: 2022-02-07

## 2022-01-05 NOTE — TELEPHONE ENCOUNTER
Rx Refill Note  Requested Prescriptions     Pending Prescriptions Disp Refills   • sertraline (ZOLOFT) 100 MG tablet [Pharmacy Med Name: SERTRALINE 100MG] 30 tablet 0     Sig: TAKE 1 TABLET BY MOUTH DAILY      Last office visit with prescribing clinician: 03/22/2021  Next office visit with prescribing clinician: Visit date not found   CPE done 08/2020     Is Refill Pharmacy correct?: yes    Kamini Marquez MA  01/05/22, 08:23 CST

## 2022-02-06 NOTE — TELEPHONE ENCOUNTER
Rx Refill Note  Requested Prescriptions     Pending Prescriptions Disp Refills   • sertraline (ZOLOFT) 100 MG tablet [Pharmacy Med Name: SERTRALINE 100MG] 30 tablet 0     Sig: TAKE 1 TABLET BY MOUTH DAILY      Last office visit with prescribing clinician: 3/22/2021      Next office visit with prescribing clinician: Visit date not found   Last RX: 01/05/2022  Wan Bridges MA  02/06/22, 12:20 CST

## 2022-02-07 RX ORDER — SERTRALINE HYDROCHLORIDE 100 MG/1
TABLET, FILM COATED ORAL
Qty: 30 TABLET | Refills: 0 | Status: SHIPPED | OUTPATIENT
Start: 2022-02-07 | End: 2022-03-07

## 2022-03-03 DIAGNOSIS — R51.9 NONINTRACTABLE HEADACHE, UNSPECIFIED CHRONICITY PATTERN, UNSPECIFIED HEADACHE TYPE: Primary | ICD-10-CM

## 2022-03-03 RX ORDER — BUTALBITAL, ACETAMINOPHEN AND CAFFEINE 50; 325; 40 MG/1; MG/1; MG/1
TABLET ORAL
Qty: 10 TABLET | Refills: 0 | Status: SHIPPED | OUTPATIENT
Start: 2022-03-03 | End: 2022-04-21

## 2022-03-03 NOTE — TELEPHONE ENCOUNTER
Rx Refill Note  Requested Prescriptions     Pending Prescriptions Disp Refills   • butalbital-acetaminophen-caffeine (FIORICET, ESGIC) -40 MG per tablet [Pharmacy Med Name: BUT/APAP/CAF] 10 tablet 0     Sig: TAKE 1 TABLET BY MOUTH EVERY 4 HOURS AS NEEDED FOR HEADACHE      Last office visit with prescribing clinician: 5/10/21  Next office visit with prescribing clinician: Visit date not found            Kamini Zhou MA  03/03/22, 10:59 CST

## 2022-03-07 RX ORDER — SERTRALINE HYDROCHLORIDE 100 MG/1
TABLET, FILM COATED ORAL
Qty: 30 TABLET | Refills: 0 | Status: SHIPPED | OUTPATIENT
Start: 2022-03-07 | End: 2022-04-04

## 2022-03-07 NOTE — TELEPHONE ENCOUNTER
Rx Refill Note  Requested Prescriptions     Pending Prescriptions Disp Refills   • sertraline (ZOLOFT) 100 MG tablet [Pharmacy Med Name: SERTRALINE 100MG] 30 tablet 0     Sig: TAKE 1 TABLET BY MOUTH DAILY      Last office visit with prescribing clinician: 3/22/2021      Next office visit with prescribing clinician: Visit date not found            Kamini Zhou MA  03/07/22, 09:40 CST       Patient is past due CPE.   Patient will call back to schedule an appointment.

## 2022-03-28 RX ORDER — LOSARTAN POTASSIUM 50 MG/1
50 TABLET ORAL NIGHTLY
Qty: 90 TABLET | Refills: 0 | Status: SHIPPED | OUTPATIENT
Start: 2022-03-28 | End: 2022-07-05 | Stop reason: SDUPTHER

## 2022-03-28 NOTE — TELEPHONE ENCOUNTER
Rx Refill Note  Requested Prescriptions     Pending Prescriptions Disp Refills   • losartan (COZAAR) 50 MG tablet [Pharmacy Med Name: LOSARTAN POT 50MG] 90 tablet 0     Sig: TAKE 1 TABLET BY MOUTH EVERY NIGHT.      Last office visit with prescribing clinician: Visit date not found      Next office visit with prescribing clinician: Visit date not found   CPE done 08/2021    {TIP  Please add Last Relevant Lab Date if appropriate: 01/04/2021  {TIP  Is Refill Pharmacy correct?: yes    Kamini Marquez MA  03/28/22, 09:50 CDT

## 2022-04-04 RX ORDER — SERTRALINE HYDROCHLORIDE 100 MG/1
TABLET, FILM COATED ORAL
Qty: 12 TABLET | Refills: 0 | Status: SHIPPED | OUTPATIENT
Start: 2022-04-04 | End: 2022-04-18

## 2022-04-04 NOTE — TELEPHONE ENCOUNTER
Rx Refill Note  Requested Prescriptions     Pending Prescriptions Disp Refills   • sertraline (ZOLOFT) 100 MG tablet [Pharmacy Med Name: SERTRALINE 100MG] 30 tablet 0     Sig: TAKE 1 TABLET BY MOUTH DAILY      Last office visit with prescribing clinician: 3/22/2021      Next office visit with prescribing clinician: Visit date not found            Kamini Zhou MA  04/04/22, 12:15 CDT        Patient is past due CPE and has been contacted several times advising.  Last CPE 8/11/21

## 2022-04-15 ENCOUNTER — OFFICE VISIT (OUTPATIENT)
Dept: FAMILY MEDICINE CLINIC | Facility: CLINIC | Age: 47
End: 2022-04-15

## 2022-04-15 VITALS
HEART RATE: 87 BPM | WEIGHT: 253 LBS | SYSTOLIC BLOOD PRESSURE: 118 MMHG | TEMPERATURE: 98.2 F | RESPIRATION RATE: 18 BRPM | HEIGHT: 70 IN | OXYGEN SATURATION: 98 % | DIASTOLIC BLOOD PRESSURE: 60 MMHG | BODY MASS INDEX: 36.22 KG/M2

## 2022-04-15 DIAGNOSIS — Z79.4 TYPE 2 DIABETES MELLITUS WITH HYPERGLYCEMIA, WITH LONG-TERM CURRENT USE OF INSULIN: ICD-10-CM

## 2022-04-15 DIAGNOSIS — E11.65 TYPE 2 DIABETES MELLITUS WITH HYPERGLYCEMIA, WITH LONG-TERM CURRENT USE OF INSULIN: ICD-10-CM

## 2022-04-15 DIAGNOSIS — Z00.00 ROUTINE GENERAL MEDICAL EXAMINATION AT A HEALTH CARE FACILITY: Primary | ICD-10-CM

## 2022-04-15 DIAGNOSIS — R73.9 HYPERGLYCEMIA: ICD-10-CM

## 2022-04-15 LAB
BILIRUB BLD-MCNC: NEGATIVE MG/DL
CLARITY, POC: CLEAR
COLOR UR: YELLOW
GLUCOSE UR STRIP-MCNC: NEGATIVE MG/DL
KETONES UR QL: NEGATIVE
LEUKOCYTE EST, POC: NEGATIVE
NITRITE UR-MCNC: NEGATIVE MG/ML
PH UR: 6.5 [PH] (ref 5–8)
PROT UR STRIP-MCNC: NEGATIVE MG/DL
RBC # UR STRIP: NEGATIVE /UL
SP GR UR: 1.02 (ref 1–1.03)
UROBILINOGEN UR QL: NORMAL

## 2022-04-15 PROCEDURE — 81003 URINALYSIS AUTO W/O SCOPE: CPT | Performed by: FAMILY MEDICINE

## 2022-04-15 PROCEDURE — 99396 PREV VISIT EST AGE 40-64: CPT | Performed by: FAMILY MEDICINE

## 2022-04-15 RX ORDER — DICLOFENAC SODIUM 75 MG/1
TABLET, DELAYED RELEASE ORAL
COMMUNITY
Start: 2022-03-07

## 2022-04-15 NOTE — PROGRESS NOTES
Chief Complaint   Patient presents with   • Annual Exam     Fasting labs done this am       History:  Trevor Hernandez is a 46 y.o. male who presents today for evaluation of the above problems.      46-year-old male with history of diabetes and hypertension        ROS:  Review of Systems   Eyes: Negative.    Respiratory: Negative for shortness of breath.    Cardiovascular: Negative for chest pain and leg swelling.   Gastrointestinal: Negative.    Endocrine: Negative for polydipsia, polyphagia and polyuria.   Genitourinary: Negative.    Musculoskeletal: Positive for back pain.   All other systems reviewed and are negative.      Allergies   Allergen Reactions   • Ambien [Zolpidem Tartrate] Confusion     Past Medical History:   Diagnosis Date   • Chronic back pain    • Diabetes mellitus (HCC)    • Diabetic neuropathy (HCC)    • History of migraine headaches    • Hypercholesterolemia    • Hypercholesterolemia    • Hypertension    • Obesity    • Pneumonia due to COVID-19 virus 12/8/2020     Past Surgical History:   Procedure Laterality Date   • ANTERIOR LUMBAR EXPOSURE N/A 11/27/2017    Procedure: ANTERIOR LUMBAR EXPOSURE;  Surgeon: Devon Guan DO;  Location:  [facility] OR;  Service:    • BACK SURGERY      X 2   • LUMBAR FUSION N/A 11/27/2017    Procedure: ANTERIOR DECOMPRESSION, ANTERIOR LUMBAR INTERBODY FUSION WITH INSTRUMENTATION L5-S1;  Surgeon: SHERI Lunsford MD;  Location:  [facility] OR;  Service:    • LUMBAR LAMINECTOMY WITH FUSION N/A 11/29/2017    Procedure: POSTERIOR SPINAL FUSION WITH INSTRUMENTATION L5-S1;  Surgeon: SHERI Lunsford MD;  Location:  [facility] OR;  Service:      Family History   Problem Relation Age of Onset   • Diabetes Other    • Heart disease Other    • Diabetes Father    • Heart disease Father       reports that he has never smoked. He has quit using smokeless tobacco.  His smokeless tobacco use included chew. He reports that he does not drink alcohol and does not use  "drugs.      Current Outpatient Medications:   •  atorvastatin (Lipitor) 40 MG tablet, Take 1 tablet by mouth Every Night., Disp: 90 tablet, Rfl: 3  •  butalbital-acetaminophen-caffeine (FIORICET, ESGIC) -40 MG per tablet, TAKE 1 TABLET BY MOUTH EVERY 4 HOURS AS NEEDED FOR HEADACHE, Disp: 10 tablet, Rfl: 0  •  diclofenac (VOLTAREN) 75 MG EC tablet, , Disp: , Rfl:   •  gabapentin (NEURONTIN) 800 MG tablet, Take 800 mg by mouth 3 (Three) Times a Day., Disp: , Rfl:   •  Insulin Pen Needle (PEN NEEDLES) 31G X 8 MM misc, use as indicated 4 times daily., Disp: 120 each, Rfl: 11  •  losartan (COZAAR) 50 MG tablet, TAKE 1 TABLET BY MOUTH EVERY NIGHT., Disp: 90 tablet, Rfl: 0  •  Semaglutide, 1 MG/DOSE, (Ozempic, 1 MG/DOSE,) 2 MG/1.5ML solution pen-injector, Inject 1 mg under the skin into the appropriate area as directed 1 (One) Time Per Week., Disp: 2 pen, Rfl: 11  •  sertraline (ZOLOFT) 100 MG tablet, TAKE 1 TABLET BY MOUTH DAILY, Disp: 12 tablet, Rfl: 0    OBJECTIVE:  /60 (BP Location: Left arm, Patient Position: Sitting, Cuff Size: Adult)   Pulse 87   Temp 98.2 °F (36.8 °C)   Resp 18   Ht 177.8 cm (70\")   Wt 115 kg (253 lb)   SpO2 98%   BMI 36.30 kg/m²    Physical Exam  Vitals and nursing note reviewed.   Constitutional:       Appearance: He is obese.   HENT:      Right Ear: Tympanic membrane and ear canal normal.      Left Ear: Tympanic membrane and ear canal normal.   Eyes:      Extraocular Movements: Extraocular movements intact.      Pupils: Pupils are equal, round, and reactive to light.   Neck:      Vascular: No carotid bruit.   Cardiovascular:      Rate and Rhythm: Normal rate and regular rhythm.      Pulses:           Dorsalis pedis pulses are 1+ on the right side and 1+ on the left side.        Posterior tibial pulses are 1+ on the right side and 1+ on the left side.      Heart sounds: Normal heart sounds.   Pulmonary:      Effort: Pulmonary effort is normal.      Breath sounds: Normal breath " sounds.   Abdominal:      General: Abdomen is flat.      Palpations: Abdomen is soft. There is no mass.      Tenderness: There is no abdominal tenderness.   Genitourinary:     Penis: Normal.       Testes: Normal.      Prostate: Normal.      Comments: No testicular masses inguinal hernia or adenopathy-prostate 2+ no nodules  Musculoskeletal:      Right lower leg: No edema.      Left lower leg: No edema.      Right foot: Normal range of motion. No deformity or bunion.      Left foot: Normal range of motion. No deformity or bunion.   Feet:      Right foot:      Skin integrity: Skin integrity normal.      Toenail Condition: Right toenails are normal.      Left foot:      Skin integrity: Skin integrity normal.      Toenail Condition: Left toenails are normal.   Lymphadenopathy:      Cervical: No cervical adenopathy.   Skin:     General: Skin is warm and dry.      Capillary Refill: Capillary refill takes less than 2 seconds.   Neurological:      General: No focal deficit present.      Mental Status: He is alert and oriented to person, place, and time.   Psychiatric:         Mood and Affect: Mood normal.         Behavior: Behavior normal.         Thought Content: Thought content normal.         Judgment: Judgment normal.         Patient's Body mass index is 36.3 kg/m². indicating that he is obese (BMI >30). Obesity-related health conditions include the following: hypertension, diabetes mellitus and osteoarthritis. Obesity is unchanged. BMI is is above average; BMI management plan is completed. We discussed portion control and increasing exercise..      Health Maintenance:  Immunization History   Administered Date(s) Administered   • Pneumococcal Conjugate 13-Valent (PCV13) 12/05/2018   • Tdap 12/05/2018   • flucelvax quad pfs =>4 YRS 12/05/2018, 10/11/2019   • influenza Split 10/11/2016        Colonoscopy:  FOBT done Refuses at this time    Assessment/Plan    Diagnoses and all orders for this visit:    1. Routine general  medical examination at a health care facility (Primary)  -     TSH  -     T4, free  -     CBC & Differential  -     Comprehensive Metabolic Panel  -     Lipid Panel    2. Type 2 diabetes mellitus with hyperglycemia, with long-term current use of insulin ([facility])  -     Microalbumin / Creatinine Urine Ratio - Urine, Clean Catch    3. Hyperglycemia  -     Hemoglobin A1c  -     POC Urinalysis Dipstick, Multipro      Plan above-C8 Covid recently and does not want vaccination at this point    An After Visit Summary was printed and given to the patient at discharge.  Return in about 6 months (around 10/15/2022), or if symptoms worsen or fail to improve.         Efrem Deng MD 4/15/2022   Electronically signed.

## 2022-04-16 LAB
ALBUMIN SERPL-MCNC: 4.3 G/DL (ref 3.5–5.2)
ALBUMIN/CREAT UR: 4 MG/G CREAT (ref 0–29)
ALBUMIN/GLOB SERPL: 1.3 G/DL
ALP SERPL-CCNC: 79 U/L (ref 39–117)
ALT SERPL-CCNC: 66 U/L (ref 1–41)
AST SERPL-CCNC: 26 U/L (ref 1–40)
BASOPHILS # BLD AUTO: 0.09 10*3/MM3 (ref 0–0.2)
BASOPHILS NFR BLD AUTO: 1.1 % (ref 0–1.5)
BILIRUB SERPL-MCNC: 0.6 MG/DL (ref 0–1.2)
BUN SERPL-MCNC: 8 MG/DL (ref 6–20)
BUN/CREAT SERPL: 9.6 (ref 7–25)
CALCIUM SERPL-MCNC: 11 MG/DL (ref 8.6–10.5)
CHLORIDE SERPL-SCNC: 102 MMOL/L (ref 98–107)
CHOLEST SERPL-MCNC: 178 MG/DL (ref 0–200)
CO2 SERPL-SCNC: 25.8 MMOL/L (ref 22–29)
CREAT SERPL-MCNC: 0.83 MG/DL (ref 0.76–1.27)
CREAT UR-MCNC: 355.8 MG/DL
EGFRCR SERPLBLD CKD-EPI 2021: 110 ML/MIN/1.73
EOSINOPHIL # BLD AUTO: 0.33 10*3/MM3 (ref 0–0.4)
EOSINOPHIL NFR BLD AUTO: 4.2 % (ref 0.3–6.2)
ERYTHROCYTE [DISTWIDTH] IN BLOOD BY AUTOMATED COUNT: 13.2 % (ref 12.3–15.4)
GLOBULIN SER CALC-MCNC: 3.2 GM/DL
GLUCOSE SERPL-MCNC: 129 MG/DL (ref 65–99)
HBA1C MFR BLD: 6.6 % (ref 4.8–5.6)
HCT VFR BLD AUTO: 47.4 % (ref 37.5–51)
HDLC SERPL-MCNC: 37 MG/DL (ref 40–60)
HGB BLD-MCNC: 15.9 G/DL (ref 13–17.7)
IMM GRANULOCYTES # BLD AUTO: 0.04 10*3/MM3 (ref 0–0.05)
IMM GRANULOCYTES NFR BLD AUTO: 0.5 % (ref 0–0.5)
LDLC SERPL CALC-MCNC: 108 MG/DL (ref 0–100)
LYMPHOCYTES # BLD AUTO: 2.28 10*3/MM3 (ref 0.7–3.1)
LYMPHOCYTES NFR BLD AUTO: 29 % (ref 19.6–45.3)
MCH RBC QN AUTO: 29.3 PG (ref 26.6–33)
MCHC RBC AUTO-ENTMCNC: 33.5 G/DL (ref 31.5–35.7)
MCV RBC AUTO: 87.3 FL (ref 79–97)
MICROALBUMIN UR-MCNC: 16 UG/ML
MONOCYTES # BLD AUTO: 0.57 10*3/MM3 (ref 0.1–0.9)
MONOCYTES NFR BLD AUTO: 7.3 % (ref 5–12)
NEUTROPHILS # BLD AUTO: 4.54 10*3/MM3 (ref 1.7–7)
NEUTROPHILS NFR BLD AUTO: 57.9 % (ref 42.7–76)
NRBC BLD AUTO-RTO: 0 /100 WBC (ref 0–0.2)
PLATELET # BLD AUTO: 301 10*3/MM3 (ref 140–450)
POTASSIUM SERPL-SCNC: 4.4 MMOL/L (ref 3.5–5.2)
PROT SERPL-MCNC: 7.5 G/DL (ref 6–8.5)
RBC # BLD AUTO: 5.43 10*6/MM3 (ref 4.14–5.8)
SODIUM SERPL-SCNC: 139 MMOL/L (ref 136–145)
T4 FREE SERPL-MCNC: 1.19 NG/DL (ref 0.93–1.7)
TRIGL SERPL-MCNC: 187 MG/DL (ref 0–150)
TSH SERPL DL<=0.005 MIU/L-ACNC: 1.1 UIU/ML (ref 0.27–4.2)
VLDLC SERPL CALC-MCNC: 33 MG/DL (ref 5–40)
WBC # BLD AUTO: 7.85 10*3/MM3 (ref 3.4–10.8)

## 2022-04-18 RX ORDER — SERTRALINE HYDROCHLORIDE 100 MG/1
100 TABLET, FILM COATED ORAL DAILY
Qty: 90 TABLET | Refills: 3 | Status: SHIPPED | OUTPATIENT
Start: 2022-04-18 | End: 2022-07-05 | Stop reason: SDUPTHER

## 2022-04-18 NOTE — TELEPHONE ENCOUNTER
Rx Refill Note  Requested Prescriptions     Pending Prescriptions Disp Refills   • sertraline (ZOLOFT) 100 MG tablet [Pharmacy Med Name: SERTRALINE 100MG] 12 tablet 0     Sig: TAKE 1 TABLET BY MOUTH DAILY. PATIENT NEEDS TO CONTACT OFFICE FOR FURTHER REFILLS      Last office visit with prescribing clinician: 4/15/2022      Next office visit with prescribing clinician: 10/21/2022            Kamini Zhuo MA  04/18/22, 09:20 CDT

## 2022-04-21 DIAGNOSIS — R51.9 NONINTRACTABLE HEADACHE, UNSPECIFIED CHRONICITY PATTERN, UNSPECIFIED HEADACHE TYPE: ICD-10-CM

## 2022-04-21 RX ORDER — BUTALBITAL, ACETAMINOPHEN AND CAFFEINE 50; 325; 40 MG/1; MG/1; MG/1
TABLET ORAL
Qty: 10 TABLET | Refills: 0 | Status: SHIPPED | OUTPATIENT
Start: 2022-04-21 | End: 2022-10-03

## 2022-04-21 NOTE — TELEPHONE ENCOUNTER
Rx Refill Note  Requested Prescriptions     Pending Prescriptions Disp Refills   • butalbital-acetaminophen-caffeine (FIORICET, ESGIC) -40 MG per tablet [Pharmacy Med Name: BUT/APAP/CAF] 10 tablet 0     Sig: TAKE 1 TABLET BY MOUTH EVERY 4 HOURS AS NEEDED FOR HEADACHE      Last office visit with prescribing clinician: 4/15/2022      Next office visit with prescribing clinician: 10/21/2022   CPE done 04/16/2022    Patient advised to come by office and leave a UDS and sign the contract.    {TIP  Please add Last Relevant Lab Date if appropriate: 04/15/2022  {TIP  Is Refill Pharmacy correct?: yes    Kamini Marquez MA  04/21/22, 08:51 CDT

## 2022-05-03 RX ORDER — SEMAGLUTIDE 1.34 MG/ML
INJECTION, SOLUTION SUBCUTANEOUS
Qty: 3 ML | Refills: 6 | OUTPATIENT
Start: 2022-05-03

## 2022-05-06 ENCOUNTER — TELEMEDICINE (OUTPATIENT)
Dept: ENDOCRINOLOGY | Facility: CLINIC | Age: 47
End: 2022-05-06

## 2022-05-06 DIAGNOSIS — E11.42 DIABETIC POLYNEUROPATHY ASSOCIATED WITH TYPE 2 DIABETES MELLITUS: ICD-10-CM

## 2022-05-06 DIAGNOSIS — I10 PRIMARY HYPERTENSION: ICD-10-CM

## 2022-05-06 DIAGNOSIS — E11.65 TYPE 2 DIABETES MELLITUS WITH HYPERGLYCEMIA, WITHOUT LONG-TERM CURRENT USE OF INSULIN: Primary | ICD-10-CM

## 2022-05-06 DIAGNOSIS — E78.2 MIXED HYPERLIPIDEMIA: ICD-10-CM

## 2022-05-06 PROCEDURE — 99214 OFFICE O/P EST MOD 30 MIN: CPT | Performed by: NURSE PRACTITIONER

## 2022-05-06 RX ORDER — SEMAGLUTIDE 1.34 MG/ML
1 INJECTION, SOLUTION SUBCUTANEOUS WEEKLY
Qty: 6 PEN | Refills: 3 | Status: SHIPPED | OUTPATIENT
Start: 2022-05-06 | End: 2022-11-22

## 2022-05-06 NOTE — PROGRESS NOTES
Chief Complaint  No chief complaint on file.    Subjective          Trevor Hernandez presents to [facility]  History of Present Illness         You have chosen to receive care through a telehealth visit.  Do you consent to use a video/audio connection for your medical care today? Yes            TELEHEALTH VIDEO VISIT     This a video visit due to [facility] current guidelines for social distancing due to the COVID 19 pandemic        Referring provider Aixa Deng MD       46-year-old male presents for follow-up    Reason diabetes mellitus type 2    Diagnosed in 2016    Timing constant    Quality great control    Severity is moderate      Microvascular complications neuropathy    Diabetes regimen    GLP-1    Glucose monitoring    Fingersticks    Checks 1 time daily    A.m. blood sugars under 120    This a.m. was 99    occasional high if eats high carb     Review of Systems - General ROS: negative          Objective   Vital Signs:   There were no vitals taken for this visit.    Physical Exam  Neurological:      General: No focal deficit present.      Mental Status: He is alert.   Psychiatric:         Mood and Affect: Mood normal.         Thought Content: Thought content normal.         Judgment: Judgment normal.        Result Review :   The following data was reviewed by: DAMIAN Mosquera on 05/06/2022:  Common labs    Common Labsle 8/25/21 8/25/21 4/15/22 4/15/22 4/15/22 4/15/22 4/15/22    0901 0901 0657 [id number] 0657 0657 1255   Glucose 143 (A)   129 (A)      BUN 12   8      Creatinine 0.74 (A)   0.83      eGFR Non  Am 114         eGFR African Am 139         Sodium 139   139      Potassium 4.8   4.4      Chloride 102   102      Calcium 9.6   11.0 (A)      Total Protein    7.5      Albumin    4.30      Total Bilirubin    0.6      Alkaline Phosphatase    79      AST (SGOT)    26      ALT (SGPT)    66 (A)      WBC   7.85       Hemoglobin   15.9       Hematocrit    47.4       Platelets   301       Total Cholesterol      178    Triglycerides      187 (A)    HDL Cholesterol      37 (A)    LDL Cholesterol       108 (A)    Hemoglobin A1C  6.50 (A)   6.60 (A)     Microalbumin, Urine       16.0   (A) Abnormal value       Comments are available for some flowsheets but are not being displayed.                     Assessment and Plan    Diagnoses and all orders for this visit:    1. Type 2 diabetes mellitus with hyperglycemia, without long-term current use of insulin (HCC) (Primary)    2. Diabetic polyneuropathy associated with type 2 diabetes mellitus (HCC)    3. Mixed hyperlipidemia    4. Primary hypertension    Other orders  -     Semaglutide, 1 MG/DOSE, (Ozempic, 1 MG/DOSE,) 2 MG/1.5ML solution pen-injector; Inject 1 mg under the skin into the appropriate area as directed 1 (One) Time Per Week.  Dispense: 6 pen; Refill: 3           Glycemic Management     Diabetes mellitus type 2 not controled due to hyperglycemia         Lab Results   Component Value Date    HGBA1C 6.60 (H) 04/15/2022                        Taking Ozempic   1 mg weekly keep  -           Toujeo 20 uints o-not taking      Am sugar should be 80 to 130     If you have an am sugar less than 80 decrease to 16 units         Lymjev sliding scale if needed for meals -not using         Blood sugar     Insulin     151-200           2 units  201-250           4 untis  251-300           6 nits  Above 301       8 units             Glipizide stopped      Metformin stopped due to side effects             Rules of 15 discussed for hypoglycemia            Goals for sugar     Fasting and before meals 80 to 130     2 hours after meals 180 or less        Aim for 60 grams of carbohydrate per meal     Aim for 15 grams of carbohydrate per snack         Lipid Management        Taking Lipitor 40 mg one at night     Total Cholesterol   Date Value Ref Range Status   02/10/2017 182 0 - 199 mg/dL Final     Triglycerides   Date Value Ref Range  Status   04/15/2022 187 (H) 0 - 150 mg/dL Final   02/10/2017 503 (H) 20 - 199 mg/dL Final     HDL Cholesterol   Date Value Ref Range Status   04/15/2022 37 (L) 40 - 60 mg/dL Final   02/10/2017 35 (L) 60 - 200 mg/dL Final     LDL Chol Calc (NIH)   Date Value Ref Range Status   04/15/2022 108 (H) 0 - 100 mg/dL Final              Blood Pressure Management     Taking Cozaar 50 mg one daily         Microvascular Complication Monitoring        Last eye exam August 18, 2020  No DR         Neuropathy      Taking gabapentin 800 mg tid                   Lab Results   Component Value Date     MICROALBUR 4.4 08/10/2020         Bone Health              Lab Results   Component Value Date     CALCIUM 9.7 02/08/2021                  Other Diabetes Related Aspects        Lab Results   Component Value Date    FERDXATR79 804 11/29/2017       Thyroid health         Lab Results   Component Value Date    TSH 1.100 04/15/2022                    Follow Up   No follow-ups on file.  Patient was given instructions and counseling regarding his condition or for health maintenance advice. Please see specific information pulled into the AVS if appropriate.         This document has been electronically signed by DAMIAN Mosquera on May 6, 2022 16:41 CDT.

## 2022-06-13 DIAGNOSIS — F41.9 ANXIETY: Primary | ICD-10-CM

## 2022-06-13 RX ORDER — LORAZEPAM 1 MG/1
1 TABLET ORAL EVERY 6 HOURS PRN
Qty: 12 TABLET | Refills: 0 | Status: SHIPPED | OUTPATIENT
Start: 2022-06-13 | End: 2023-01-10

## 2022-06-13 NOTE — TELEPHONE ENCOUNTER
Wife calling and inquiring about medication for anxiety for Edward--son (Daniel)  last evening-unknown but may have been from overdose.  Meeting at noon today for  arrangements. Please advise      [facility]

## 2022-07-05 ENCOUNTER — OFFICE VISIT (OUTPATIENT)
Dept: FAMILY MEDICINE CLINIC | Facility: CLINIC | Age: 47
End: 2022-07-05

## 2022-07-05 VITALS
DIASTOLIC BLOOD PRESSURE: 118 MMHG | RESPIRATION RATE: 16 BRPM | WEIGHT: 253.6 LBS | TEMPERATURE: 97.8 F | BODY MASS INDEX: 36.31 KG/M2 | HEIGHT: 70 IN | SYSTOLIC BLOOD PRESSURE: 190 MMHG | HEART RATE: 83 BPM | OXYGEN SATURATION: 97 %

## 2022-07-05 DIAGNOSIS — R07.9 CHEST PAIN, UNSPECIFIED TYPE: Primary | ICD-10-CM

## 2022-07-05 DIAGNOSIS — F41.9 ANXIETY: ICD-10-CM

## 2022-07-05 PROCEDURE — 99213 OFFICE O/P EST LOW 20 MIN: CPT | Performed by: FAMILY MEDICINE

## 2022-07-05 PROCEDURE — 93000 ELECTROCARDIOGRAM COMPLETE: CPT | Performed by: FAMILY MEDICINE

## 2022-07-05 RX ORDER — OXYCODONE AND ACETAMINOPHEN 10; 325 MG/1; MG/1
1 TABLET ORAL DAILY PRN
COMMUNITY
Start: 2022-05-16

## 2022-07-05 RX ORDER — SERTRALINE HYDROCHLORIDE 100 MG/1
TABLET, FILM COATED ORAL
Qty: 135 TABLET | Refills: 3 | Status: SHIPPED | OUTPATIENT
Start: 2022-07-05

## 2022-07-05 RX ORDER — PANTOPRAZOLE SODIUM 40 MG/1
40 TABLET, DELAYED RELEASE ORAL DAILY
Qty: 30 TABLET | Refills: 2 | Status: SHIPPED | OUTPATIENT
Start: 2022-07-05

## 2022-07-05 RX ORDER — CLONAZEPAM 1 MG/1
TABLET ORAL
Qty: 30 TABLET | Refills: 0 | Status: SHIPPED | OUTPATIENT
Start: 2022-07-05 | End: 2023-01-10

## 2022-07-05 RX ORDER — LOSARTAN POTASSIUM 100 MG/1
100 TABLET ORAL NIGHTLY
Qty: 90 TABLET | Refills: 3 | Status: SHIPPED | OUTPATIENT
Start: 2022-07-05

## 2022-07-05 NOTE — PROGRESS NOTES
"Trevor Hernandez    1975    Chief Complaint   Patient presents with   • Depression     Son passed away and wanting to see about getting on a medication.       Vitals:    07/05/22 1513   BP: (!) 190/118  Comment: having anxiety after son passed away   BP Location: Left arm   Patient Position: Sitting   Cuff Size: Adult   Pulse: 83   Resp: 16   Temp: 97.8 °F (36.6 °C)   TempSrc: Temporal   SpO2: 97%   Weight: 115 kg (253 lb 9.6 oz)   Height: 177.8 cm (70\")   PainSc: 0-No pain       47-year-old male who recently lost his son complaining of anxiety and grieving      Review of Systems   Cardiovascular: Positive for chest pain.   Gastrointestinal:        GERD   Musculoskeletal: Positive for back pain.   Psychiatric/Behavioral: Positive for dysphoric mood. Negative for suicidal ideas. The patient is nervous/anxious.         Grieving       Past Medical History:   Diagnosis Date   • Chronic back pain    • Diabetes mellitus (HCC)    • Diabetic neuropathy (HCC)    • History of migraine headaches    • Hypercholesterolemia    • Hypercholesterolemia    • Hypertension    • Obesity    • Pneumonia due to COVID-19 virus 12/8/2020         Current Outpatient Medications:   •  atorvastatin (Lipitor) 40 MG tablet, Take 1 tablet by mouth Every Night., Disp: 90 tablet, Rfl: 3  •  butalbital-acetaminophen-caffeine (FIORICET, ESGIC) -40 MG per tablet, TAKE 1 TABLET BY MOUTH EVERY 4 HOURS AS NEEDED FOR HEADACHE, Disp: 10 tablet, Rfl: 0  •  diclofenac (VOLTAREN) 75 MG EC tablet, , Disp: , Rfl:   •  gabapentin (NEURONTIN) 800 MG tablet, Take 800 mg by mouth 3 (Three) Times a Day., Disp: , Rfl:   •  Insulin Pen Needle (PEN NEEDLES) 31G X 8 MM misc, use as indicated 4 times daily., Disp: 120 each, Rfl: 11  •  LORazepam (Ativan) 1 MG tablet, Take 1 tablet by mouth Every 6 (Six) Hours As Needed for Anxiety., Disp: 12 tablet, Rfl: 0  •  losartan (COZAAR) 100 MG tablet, Take 1 tablet by mouth Every Night., Disp: 90 tablet, Rfl: 3  •  " Semaglutide, 1 MG/DOSE, (Ozempic, 1 MG/DOSE,) 2 MG/1.5ML solution pen-injector, Inject 1 mg under the skin into the appropriate area as directed 1 (One) Time Per Week., Disp: 6 pen, Rfl: 3  •  sertraline (ZOLOFT) 100 MG tablet, 1-1/2 every morning, Disp: 135 tablet, Rfl: 3  •  clonazePAM (KlonoPIN) 1 MG tablet, 1-2 at bedtime for rest prn, Disp: 30 tablet, Rfl: 0  •  oxyCODONE-acetaminophen (PERCOCET)  MG per tablet, , Disp: , Rfl:   •  pantoprazole (PROTONIX) 40 MG EC tablet, Take 1 tablet by mouth Daily., Disp: 30 tablet, Rfl: 2    Allergies   Allergen Reactions   • Ambien [Zolpidem Tartrate] Confusion       Patient Active Problem List   Diagnosis   • Type 2 diabetes mellitus with hyperglycemia, without long-term current use of insulin (HCC)   • Morbidly obese (HCC)   • Back pain   • Displacement of lumbar intervertebral disc without myelopathy   • Mixed hyperlipidemia   • Primary hypertension   • Acute respiratory failure with hypoxia ([facility])   • Pneumonia due to COVID-19 virus   • Sepsis (HCC)   • Diabetic polyneuropathy associated with type 2 diabetes mellitus (HCC)       Social History     Socioeconomic History   • Marital status:    Tobacco Use   • Smoking status: Never Smoker   • Smokeless tobacco: Former User     Types: Chew   Vaping Use   • Vaping Use: Never used   Substance and Sexual Activity   • Alcohol use: Yes   • Drug use: No   • Sexual activity: Defer       Past Surgical History:   Procedure Laterality Date   • ANTERIOR LUMBAR EXPOSURE N/A 11/27/2017    Procedure: ANTERIOR LUMBAR EXPOSURE;  Surgeon: Devon Guan DO;  Location: [facility] OR;  Service:    • BACK SURGERY      X 2   • LUMBAR FUSION N/A 11/27/2017    Procedure: ANTERIOR DECOMPRESSION, ANTERIOR LUMBAR INTERBODY FUSION WITH INSTRUMENTATION L5-S1;  Surgeon: SHERI Lunsford MD;  Location: [facility] OR;  Service:    • LUMBAR LAMINECTOMY WITH FUSION N/A 11/29/2017    Procedure: POSTERIOR SPINAL FUSION WITH INSTRUMENTATION L5-S1;   "Surgeon: SHERI Lunsford MD;  Location: [facility] OR;  Service:        Physical Exam  Vitals and nursing note reviewed.   Constitutional:       Appearance: He is obese.   Cardiovascular:      Rate and Rhythm: Normal rate and regular rhythm.   Pulmonary:      Effort: Pulmonary effort is normal.      Breath sounds: Normal breath sounds.   Musculoskeletal:      Right lower leg: No edema.      Left lower leg: No edema.   Neurological:      General: No focal deficit present.      Mental Status: He is alert and oriented to person, place, and time.   Psychiatric:         Mood and Affect: Mood normal.         Behavior: Behavior normal.         Thought Content: Thought content normal.         Judgment: Judgment normal.         Vitals:    07/05/22 1513   BP: (!) 190/118   BP Location: Left arm   Patient Position: Sitting   Cuff Size: Adult   Pulse: 83   Resp: 16   Temp: 97.8 °F (36.6 °C)   TempSrc: Temporal   SpO2: 97%   Weight: 115 kg (253 lb 9.6 oz)   Height: 177.8 cm (70\")           Diagnoses and all orders for this visit:    1. Chest pain, unspecified type (Primary)  -     ECG 12 Lead    2. Anxiety  -     clonazePAM (KlonoPIN) 1 MG tablet; 1-2 at bedtime for rest prn  Dispense: 30 tablet; Refill: 0    Other orders  -     losartan (COZAAR) 100 MG tablet; Take 1 tablet by mouth Every Night.  Dispense: 90 tablet; Refill: 3  -     sertraline (ZOLOFT) 100 MG tablet; 1-1/2 every morning  Dispense: 135 tablet; Refill: 3  -     pantoprazole (PROTONIX) 40 MG EC tablet; Take 1 tablet by mouth Daily.  Dispense: 30 tablet; Refill: 2        Problems Addressed this Visit    None     Visit Diagnoses     Chest pain, unspecified type    -  Primary    Relevant Orders    ECG 12 Lead    Anxiety        Relevant Medications    clonazePAM (KlonoPIN) 1 MG tablet      Diagnoses       Codes Comments    Chest pain, unspecified type    -  Primary ICD-10-CM: R07.9  ICD-9-CM: 786.50     Anxiety     ICD-10-CM: F41.9  ICD-9-CM: 300.00           Health " Maintenance:  Immunization History   Administered Date(s) Administered   • Pneumococcal Conjugate 13-Valent (PCV13) 12/05/2018   • Tdap 12/05/2018   • flucelvax quad pfs =>4 YRS 12/05/2018, 10/11/2019   • influenza Split 10/11/2016          EKG unremarkable-no EKG for comparison      Plan-above, call blood pressures 2 weeks, increase losartan 200 mg nightly add Protonix 40+ short-term Klonopin 1 mg at night-increase Zoloft 100 mg to 1-1/2 every morning                  Electronically signed by Efrem Deng MD 07/05/2022

## 2022-10-03 DIAGNOSIS — R51.9 NONINTRACTABLE HEADACHE, UNSPECIFIED CHRONICITY PATTERN, UNSPECIFIED HEADACHE TYPE: ICD-10-CM

## 2022-10-03 RX ORDER — BUTALBITAL, ACETAMINOPHEN AND CAFFEINE 50; 325; 40 MG/1; MG/1; MG/1
TABLET ORAL
Qty: 10 TABLET | Refills: 0 | Status: SHIPPED | OUTPATIENT
Start: 2022-10-03 | End: 2023-01-10 | Stop reason: ALTCHOICE

## 2022-10-03 NOTE — TELEPHONE ENCOUNTER
Rx Refill Note  Requested Prescriptions     Pending Prescriptions Disp Refills   • butalbital-acetaminophen-caffeine (FIORICET, ESGIC) -40 MG per tablet [Pharmacy Med Name: BUT/APAP/CAF] 10 tablet 0     Sig: TAKE 1 TABLET BY MOUTH EVERY 4 HOURS AS NEEDED FOR HEADACHE      Last office visit with prescribing clinician: 7/5/2022      Next office visit with prescribing clinician: 10/21/2022            Kamini Zhou MA  10/03/22, 15:35 CDT

## 2022-11-21 RX ORDER — SEMAGLUTIDE 1.34 MG/ML
INJECTION, SOLUTION SUBCUTANEOUS
Qty: 9 ML | Refills: 3 | OUTPATIENT
Start: 2022-11-21

## 2022-11-22 DIAGNOSIS — E11.65 TYPE 2 DIABETES MELLITUS WITH HYPERGLYCEMIA, WITHOUT LONG-TERM CURRENT USE OF INSULIN: Primary | ICD-10-CM

## 2022-11-22 RX ORDER — SEMAGLUTIDE 1.34 MG/ML
1 INJECTION, SOLUTION SUBCUTANEOUS WEEKLY
Qty: 3 ML | Refills: 6 | Status: SHIPPED | OUTPATIENT
Start: 2022-11-22 | End: 2022-11-23 | Stop reason: SDUPTHER

## 2022-11-22 RX ORDER — SEMAGLUTIDE 1.34 MG/ML
1 INJECTION, SOLUTION SUBCUTANEOUS WEEKLY
Qty: 6 PEN | Refills: 3 | OUTPATIENT
Start: 2022-11-22

## 2022-11-23 ENCOUNTER — TELEMEDICINE (OUTPATIENT)
Dept: ENDOCRINOLOGY | Facility: CLINIC | Age: 47
End: 2022-11-23

## 2022-11-23 DIAGNOSIS — E78.2 MIXED HYPERLIPIDEMIA: Primary | ICD-10-CM

## 2022-11-23 DIAGNOSIS — E11.42 DIABETIC POLYNEUROPATHY ASSOCIATED WITH TYPE 2 DIABETES MELLITUS: ICD-10-CM

## 2022-11-23 DIAGNOSIS — I10 PRIMARY HYPERTENSION: ICD-10-CM

## 2022-11-23 DIAGNOSIS — E11.65 TYPE 2 DIABETES MELLITUS WITH HYPERGLYCEMIA, WITHOUT LONG-TERM CURRENT USE OF INSULIN: ICD-10-CM

## 2022-11-23 PROCEDURE — 99214 OFFICE O/P EST MOD 30 MIN: CPT | Performed by: NURSE PRACTITIONER

## 2022-11-23 RX ORDER — SEMAGLUTIDE 1.34 MG/ML
1 INJECTION, SOLUTION SUBCUTANEOUS WEEKLY
Qty: 9 ML | Refills: 3 | Status: SHIPPED | OUTPATIENT
Start: 2022-11-23

## 2022-11-23 NOTE — PROGRESS NOTES
Chief Complaint  Diabetes    Subjective          Loser Diamond Hernandez presents to [facility] ENDOCRINOLOGY  History of Present Illness           You have chosen to receive care through a telehealth visit.  Do you consent to use a video/audio connection for your medical care today? Yes            TELEHEALTH VIDEO VISIT     This a video visit due to [facility] current guidelines for social distancing due to the COVID 19 pandemic      Mode of Visit: Video  Location of patient: home  You have chosen to receive care through a telehealth visit.  Does the patient consent to use a video/audio connection for your medical care today? Yes  The visit included audio and video interaction. No technical issues occurred during this visit.         Referring provider Aixa Deng MD       47 year old male presents for follow up     Reason diabetes mellitus type 2    Diagnosed in 2016    Timing constant    Quality great control    Severity is moderate      Microvascular complications neuropathy    Diabetes regimen    GLP-1    Glucose monitoring    Fingersticks      States at goal     No low    Highest with high carb 156     Review of Systems - General ROS: negative          Objective   Vital Signs:   There were no vitals taken for this visit.    Physical Exam  Neurological:      General: No focal deficit present.      Mental Status: He is alert.   Psychiatric:         Mood and Affect: Mood normal.         Thought Content: Thought content normal.         Judgment: Judgment normal.        Result Review :   The following data was reviewed by: DAMIAN Mosquera on 05/06/2022:  Common labs    Common Labs 4/15/22 4/15/22 4/15/22 4/15/22 4/15/22    0657 [id number] 0657 [id number] [id number]   Glucose  129 (A)      BUN  8      Creatinine  0.83      Sodium  139      Potassium  4.4      Chloride  102      Calcium  11.0 (A)      Total Protein  7.5      Albumin  4.30      Total Bilirubin  0.6      Alkaline Phosphatase  79       AST (SGOT)  26      ALT (SGPT)  66 (A)      WBC 7.85       Hemoglobin 15.9       Hematocrit 47.4       Platelets 301       Total Cholesterol    178    Triglycerides    187 (A)    HDL Cholesterol    37 (A)    LDL Cholesterol     108 (A)    Hemoglobin A1C   6.60 (A)     Microalbumin, Urine     16.0   (A) Abnormal value       Comments are available for some flowsheets but are not being displayed.                       Assessment and Plan    Diagnoses and all orders for this visit:    1. Mixed hyperlipidemia (Primary)  -     CBC & Differential; Future  -     Comprehensive Metabolic Panel; Future  -     Hemoglobin A1c; Future  -     Lipid Panel; Future  -     Microalbumin / Creatinine Urine Ratio - Urine, Clean Catch; Future  -     TSH; Future    2. Type 2 diabetes mellitus with hyperglycemia, without long-term current use of insulin ([facility])  -     Semaglutide, 1 MG/DOSE, (Ozempic, 1 MG/DOSE,) 4 MG/3ML solution pen-injector; Inject 1 mg under the skin into the appropriate area as directed 1 (One) Time Per Week.  Dispense: 9 mL; Refill: 3  -     CBC & Differential; Future  -     Comprehensive Metabolic Panel; Future  -     Hemoglobin A1c; Future  -     Lipid Panel; Future  -     Microalbumin / Creatinine Urine Ratio - Urine, Clean Catch; Future  -     TSH; Future    3. Primary hypertension  -     CBC & Differential; Future  -     Comprehensive Metabolic Panel; Future  -     Hemoglobin A1c; Future  -     Lipid Panel; Future  -     Microalbumin / Creatinine Urine Ratio - Urine, Clean Catch; Future  -     TSH; Future    4. Diabetic polyneuropathy associated with type 2 diabetes mellitus (HCC)           Glycemic Management     Diabetes mellitus type 2 not controled due to hyperglycemia         Lab Results   Component Value Date    HGBA1C 6.60 (H) 04/15/2022           Taking Ozempic 1 mg weekly --keep                  Toujeo 20 uints not taking      Am sugar should be 80 to 130     If you have an am sugar less than 80 decrease  to 16 units         Lymjev sliding scale if needed for meals -not using         Blood sugar     Insulin     151-200           2 units  201-250           4 untis  251-300           6 nits  Above 301       8 units             Glipizide stopped      Metformin stopped due to side effects             Rules of 15 discussed for hypoglycemia            Goals for sugar     Fasting and before meals 80 to 130     2 hours after meals 180 or less        Aim for 60 grams of carbohydrate per meal     Aim for 15 grams of carbohydrate per snack         Lipid Management        Taking Lipitor 40 mg one at night     Total Cholesterol   Date Value Ref Range Status   02/10/2017 182 0 - 199 mg/dL Final     Triglycerides   Date Value Ref Range Status   04/15/2022 187 (H) 0 - 150 mg/dL Final   02/10/2017 503 (H) 20 - 199 mg/dL Final     HDL Cholesterol   Date Value Ref Range Status   04/15/2022 37 (L) 40 - 60 mg/dL Final   02/10/2017 35 (L) 60 - 200 mg/dL Final     LDL Chol Calc (NIH)   Date Value Ref Range Status   04/15/2022 108 (H) 0 - 100 mg/dL Final              Blood Pressure Management     Taking Cozaar 50 mg one daily         Microvascular Complication Monitoring        Last eye exam August 18, 2020  No DR         Neuropathy      Taking gabapentin 800 mg tid                   Lab Results   Component Value Date     MICROALBUR 4.4 08/10/2020         Bone Health              Lab Results   Component Value Date     CALCIUM 9.7 02/08/2021                  Other Diabetes Related Aspects        Lab Results   Component Value Date    ZOBBOGQZ05 804 11/29/2017       Thyroid health         Lab Results   Component Value Date    TSH 1.100 04/15/2022                    Follow Up   No follow-ups on file.  Patient was given instructions and counseling regarding his condition or for health maintenance advice. Please see specific information pulled into the AVS if appropriate.         This document has been electronically signed by Ronak Pastor  DAMIAN Nieves on November 23, 2022 14:15 CST.

## 2023-01-10 ENCOUNTER — OFFICE VISIT (OUTPATIENT)
Dept: FAMILY MEDICINE CLINIC | Facility: CLINIC | Age: 48
End: 2023-01-10
Payer: COMMERCIAL

## 2023-01-10 VITALS
HEART RATE: 89 BPM | OXYGEN SATURATION: 96 % | DIASTOLIC BLOOD PRESSURE: 82 MMHG | BODY MASS INDEX: 36.65 KG/M2 | TEMPERATURE: 97.8 F | SYSTOLIC BLOOD PRESSURE: 128 MMHG | HEIGHT: 70 IN | RESPIRATION RATE: 16 BRPM | WEIGHT: 256 LBS

## 2023-01-10 DIAGNOSIS — E11.65 TYPE 2 DIABETES MELLITUS WITH HYPERGLYCEMIA, WITHOUT LONG-TERM CURRENT USE OF INSULIN: ICD-10-CM

## 2023-01-10 DIAGNOSIS — E78.2 MIXED HYPERLIPIDEMIA: ICD-10-CM

## 2023-01-10 DIAGNOSIS — Z23 NEED FOR INFLUENZA VACCINATION: Primary | ICD-10-CM

## 2023-01-10 DIAGNOSIS — I10 PRIMARY HYPERTENSION: ICD-10-CM

## 2023-01-10 PROCEDURE — 90686 IIV4 VACC NO PRSV 0.5 ML IM: CPT | Performed by: FAMILY MEDICINE

## 2023-01-10 PROCEDURE — 99214 OFFICE O/P EST MOD 30 MIN: CPT | Performed by: FAMILY MEDICINE

## 2023-01-10 PROCEDURE — 90471 IMMUNIZATION ADMIN: CPT | Performed by: FAMILY MEDICINE

## 2023-01-10 RX ORDER — PREGABALIN 150 MG/1
150 CAPSULE ORAL 3 TIMES DAILY
COMMUNITY
Start: 2023-01-09

## 2023-01-10 NOTE — PROGRESS NOTES
Subjective   Trevor Hernandez is a 47 y.o. male.     History of Present Illness  47-year-old male follow-up for diabetes hyperlipidemia      The following portions of the patient's history were reviewed and updated as appropriate: allergies, current medications, past family history, past medical history, past social history, past surgical history and problem list.    Review of Systems   Respiratory: Negative for shortness of breath.    Cardiovascular: Negative for chest pain and leg swelling.   Endocrine: Negative for polydipsia, polyphagia and polyuria.   Musculoskeletal: Positive for back pain.       Objective   Physical Exam  Vitals and nursing note reviewed.   Constitutional:       Appearance: He is obese.   Eyes:      Extraocular Movements: Extraocular movements intact.      Pupils: Pupils are equal, round, and reactive to light.   Cardiovascular:      Rate and Rhythm: Normal rate and regular rhythm.   Pulmonary:      Effort: Pulmonary effort is normal.      Breath sounds: Normal breath sounds.   Musculoskeletal:      Right lower leg: No edema.      Left lower leg: No edema.   Skin:     General: Skin is warm and dry.   Neurological:      General: No focal deficit present.      Mental Status: He is alert and oriented to person, place, and time.   Psychiatric:         Mood and Affect: Mood normal.         Behavior: Behavior normal.         Thought Content: Thought content normal.         Judgment: Judgment normal.         Assessment & Plan   Diagnoses and all orders for this visit:    1. Type 2 diabetes mellitus with hyperglycemia, without long-term current use of insulin (HCC)  -     TSH  -     Microalbumin / Creatinine Urine Ratio - Urine, Clean Catch  -     Lipid Panel  -     Hemoglobin A1c  -     Comprehensive Metabolic Panel  -     CBC & Differential    2. Mixed hyperlipidemia  -     TSH  -     Microalbumin / Creatinine Urine Ratio - Urine, Clean Catch  -     Lipid Panel  -     Hemoglobin A1c  -      Comprehensive Metabolic Panel  -     CBC & Differential    3. Primary hypertension  -     TSH  -     Microalbumin / Creatinine Urine Ratio - Urine, Clean Catch  -     Lipid Panel  -     Hemoglobin A1c  -     Comprehensive Metabolic Panel  -     CBC & Differential         Plan above, flu shot, omicron booster advised

## 2023-01-11 LAB
ALBUMIN SERPL-MCNC: 4.5 G/DL (ref 4–5)
ALBUMIN/CREAT UR: <2 MG/G CREAT (ref 0–29)
ALBUMIN/GLOB SERPL: 1.7 {RATIO} (ref 1.2–2.2)
ALP SERPL-CCNC: 91 IU/L (ref 44–121)
ALT SERPL-CCNC: 52 IU/L (ref 0–44)
AMBIG ABBREV CMP14 DEFAULT: NORMAL
AMBIG ABBREV LP DEFAULT: NORMAL
AST SERPL-CCNC: 27 IU/L (ref 0–40)
BASOPHILS # BLD AUTO: 0.1 X10E3/UL (ref 0–0.2)
BASOPHILS NFR BLD AUTO: 1 %
BILIRUB SERPL-MCNC: 0.6 MG/DL (ref 0–1.2)
BUN SERPL-MCNC: 8 MG/DL (ref 6–24)
BUN/CREAT SERPL: 9 (ref 9–20)
CALCIUM SERPL-MCNC: 9.5 MG/DL (ref 8.7–10.2)
CHLORIDE SERPL-SCNC: 100 MMOL/L (ref 96–106)
CHOLEST SERPL-MCNC: 197 MG/DL (ref 100–199)
CO2 SERPL-SCNC: 24 MMOL/L (ref 20–29)
CREAT SERPL-MCNC: 0.87 MG/DL (ref 0.76–1.27)
CREAT UR-MCNC: 162.4 MG/DL
EGFRCR SERPLBLD CKD-EPI 2021: 108 ML/MIN/1.73
EOSINOPHIL # BLD AUTO: 0.3 X10E3/UL (ref 0–0.4)
EOSINOPHIL NFR BLD AUTO: 5 %
ERYTHROCYTE [DISTWIDTH] IN BLOOD BY AUTOMATED COUNT: 13.1 % (ref 11.6–15.4)
GLOBULIN SER CALC-MCNC: 2.7 G/DL (ref 1.5–4.5)
GLUCOSE SERPL-MCNC: 141 MG/DL (ref 70–99)
HBA1C MFR BLD: 6.5 % (ref 4.8–5.6)
HCT VFR BLD AUTO: 48.1 % (ref 37.5–51)
HDLC SERPL-MCNC: 38 MG/DL
HGB BLD-MCNC: 16 G/DL (ref 13–17.7)
IMM GRANULOCYTES # BLD AUTO: 0 X10E3/UL (ref 0–0.1)
IMM GRANULOCYTES NFR BLD AUTO: 0 %
LDLC SERPL CALC-MCNC: 100 MG/DL (ref 0–99)
LYMPHOCYTES # BLD AUTO: 2.1 X10E3/UL (ref 0.7–3.1)
LYMPHOCYTES NFR BLD AUTO: 32 %
MCH RBC QN AUTO: 29.5 PG (ref 26.6–33)
MCHC RBC AUTO-ENTMCNC: 33.3 G/DL (ref 31.5–35.7)
MCV RBC AUTO: 89 FL (ref 79–97)
MICROALBUMIN UR-MCNC: <3 UG/ML
MONOCYTES # BLD AUTO: 0.6 X10E3/UL (ref 0.1–0.9)
MONOCYTES NFR BLD AUTO: 8 %
NEUTROPHILS # BLD AUTO: 3.5 X10E3/UL (ref 1.4–7)
NEUTROPHILS NFR BLD AUTO: 54 %
PLATELET # BLD AUTO: 287 X10E3/UL (ref 150–450)
POTASSIUM SERPL-SCNC: 4.8 MMOL/L (ref 3.5–5.2)
PROT SERPL-MCNC: 7.2 G/DL (ref 6–8.5)
RBC # BLD AUTO: 5.43 X10E6/UL (ref 4.14–5.8)
SODIUM SERPL-SCNC: 140 MMOL/L (ref 134–144)
TRIGL SERPL-MCNC: 352 MG/DL (ref 0–149)
TSH SERPL DL<=0.005 MIU/L-ACNC: 1.38 UIU/ML (ref 0.45–4.5)
VLDLC SERPL CALC-MCNC: 59 MG/DL (ref 5–40)
WBC # BLD AUTO: 6.5 X10E3/UL (ref 3.4–10.8)

## 2023-03-13 NOTE — PAYOR COMM NOTE
"Trevor Harkins (45 y.o. Male)     [facility]  Case Management  Phone [phone]  Fax [phone]      Date of Birth Social Security Number Address Home Phone MRN    [id number]  [id number]  [facility] [phone] [id number]    Denominational Marital Status          Christianity        Admission Date Admission Type Admitting Provider Attending Provider Department, Room/Bed    12/8/20 Emergency Sumeet Zimmerman MD Craig, David A, MD [address], 427/1    Discharge Date Discharge Disposition Discharge Destination                       Attending Provider: Sumeet Zimmerman MD    Allergies: Ambien [Zolpidem Tartrate]    Isolation: Enh Drop/Con   Infection: COVID (confirmed) (12/08/20)   Code Status: CPR    Ht: 177.8 cm (70\")   Wt: 111 kg (245 lb 9.6 oz)    Admission Cmt: None   Principal Problem: None                Active Insurance as of 12/8/2020     Primary Coverage     Payor Plan Insurance Group Employer/Plan Group    [id number] PPO [id number]     Payor Plan Address Payor Plan Phone Number Payor Plan Fax Number Effective Dates    [address] [phone]  6/1/2020 - None Entered    [place] [address]       Subscriber Name Subscriber Birth Date Member ID       TREVOR HARKINS 1975 [id number]                 Emergency Contacts      (Rel.) Home Phone Work Phone Mobile Phone    eVrónica Harkins (Spouse) [phone]            Vital Signs (last day)     Date/Time   Temp   Temp src   Pulse   Resp   BP   Patient Position   SpO2    12/11/20 0809   --   --   60   22   --   --   96    12/11/20 0748   --   --   --   --   134/77   --   93    12/11/20 0747   --   --   66   19   --   --   (!) 87    12/11/20 0715   --   --   66   --   --   --   --    12/11/20 0300   --   Oral   64   18   135/82   Lying   91    12/11/20 0257   --   --   75   --   --   --   --    12/10/20 2010   97.3 (36.3) " Assessment and Plan:  Ms Katie Moreno is a 49 y o  female presenting with h/o left lumpectomy, XRT in 2019    We discussed the procedure, risks, benefits, alternatives and postoperative instructions and expectations  She understands that 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, a subpectoral implant is not feasible  I do think fat grafting is more reasonable in her circumstance given her goals and prior history  I also think a small crescentric mastopexy on the right will also ultimately assist with improved symmetry  Book form submitted  I will see the patient back once we hear from insurance  All patient's questions were answered and she voiced understanding  History of Present Illness:   Ms Katie Moreno is a 49 y o  female presenting with h/o left lumpectomy, XRT in 2019  She has noted involution of her left lumpectomy sight since surgery  Patient reports diligent skin care during and after XRT without major sequelae  She reports mild tightness on the left c/w right  She enjoys traveling and is planning a trip to [place] this summer  She denies nicotine use  She is currently a "deflated" B cup and would like to be wright particularly in her upper pole  Her  is here with her today  Her mammogram is due next month  Review of Systems:  A 12 point ROS was performed and negative except per HPI      Past Medical History:  Past Medical History:   Diagnosis Date   • Adhesive capsulitis of right shoulder     Resolved 11/15/2016    • Asthma    • Benign paroxysmal vertigo, unspecified ear     Resolved 11/15/2016    • BRCA gene mutation negative 06/2019    [facility]   • Breast cancer ([address] ) 07/30/2019   • Cellulitis of leg, except foot     Resolved 12/10/2014    • Hx of radiation therapy 08/01/2019   • Lymphadenitis     Resolved 12/10/2014    • Snoring        Past Surgical History:  Past Surgical History:   Procedure Laterality Date   • BREAST BIOPSY Left 2019   • BREAST LUMPECTOMY Left 07/30/2019    Procedure:   Oral   86   18   154/93   Lying   96    12/10/20 1923   --   --   103   --   --   --   --    12/10/20 1602   97 (36.1)   Oral   72   16   128/95   Lying   97    12/10/20 1524   --   --   79   16   --   --   96    12/10/20 1048   --   --   78   16   --   --   97    12/10/20 0905   --   --   --   --   110/70   Lying   --    12/10/20 0900   --   --   81   --   --   --   --    12/10/20 0833   96.5 (35.8)   Oral   72   16   97/74   Lying   97    12/10/20 0803   --   --   69   16   --   --   90    12/10/20 0421   --   --   60   --   --   --   --    12/10/20 0406   97 (36.1)   Temporal   70   18   120/64   Lying   95              Current Facility-Administered Medications   Medication Dose Route Frequency Provider Last Rate Last Admin   • acetaminophen (TYLENOL) tablet 650 mg  650 mg Oral Q4H PRN Sumeet Zimmerman MD        Or   • acetaminophen (TYLENOL) suppository 650 mg  650 mg Rectal Q4H PRN Sumeet Zimmerman MD       • albuterol sulfate HFA (PROVENTIL HFA;VENTOLIN HFA;PROAIR HFA) inhaler 2 puff  2 puff Inhalation 4x Daily - RT Sumeet Zimmerman MD   2 puff at 12/11/20 0809   • atorvastatin (LIPITOR) tablet 40 mg  40 mg Oral Nightly Sumeet Zimmerman MD   40 mg at 12/10/20 2016   • benzonatate (TESSALON) capsule 200 mg  200 mg Oral TID PRN Sumeet Zimmerman MD       • bisacodyl (DULCOLAX) suppository 10 mg  10 mg Rectal Daily PRN Sumeet Zimmerman MD       • calcium carbonate (TUMS) chewable tablet 500 mg (200 mg elemental)  2 tablet Oral BID PRN Sumeet Zimmerman MD       • cholecalciferol (VITAMIN D3) tablet 10,000 Units  10,000 Units Oral Daily Leandro Gonzalez MD   10,000 Units at 12/11/20 0751   • dexamethasone (DECADRON) tablet 6 mg  6 mg Oral Daily With Breakfast Sumeet Zimmerman MD   6 mg at 12/10/20 0902    Or   • dexamethasone (DECADRON) injection 6 mg  6 mg Intravenous Daily With Breakfast Sumeet Zimmerman MD   6 mg at 12/11/20 0752   • dextrose (D50W) 25 g/ 50mL Intravenous Solution 25 g  25 g Intravenous Q15 Min PRN  BREAST LUMPECTOMY; BREAST NEEDLE LOCALIZATION (NEEDLE LOC AT 0800); Surgeon: Simon Cintron MD;  Location: [facility];  Service: Surgical Oncology   • INTRAUTERINE DEVICE INSERTION  2019   • LYMPH NODE BIOPSY Left 07/30/2019    Procedure: SENTINEL LYMPH NODE BIOPSY; LYMPHATIC MAPPING WITH BLUE DYE AND RADIOACTIVE DYE (INJECT AT 0930 BY DR LEAVITT IN THE OR);   Surgeon: Simon Cintron MD;  [facility];  Service: Surgical Oncology   • MAMMO NEEDLE LOCALIZATION LEFT (ALL INC) Left 07/30/2019   • SALPINGECTOMY Bilateral 03/2021   • SHOULDER SURGERY Right     Last assessed 12/29/2015    • US GUIDED BREAST BIOPSY LEFT COMPLETE Left 06/07/2019   • WISDOM TOOTH EXTRACTION         Social History:  Social History     Tobacco Use   • Smoking status: Never   • Smokeless tobacco: Never   Vaping Use   • Vaping Use: Never used   Substance Use Topics   • Alcohol use: Yes     Comment: rare   • Drug use: No       Family History:  Family History   Problem Relation Age of Onset   • Alzheimer's disease Mother    • COPD Father    • No Known Problems Sister    • No Known Problems Daughter    • Alzheimer's disease Maternal Grandmother    • No Known Problems Maternal Grandfather    • No Known Problems Paternal Grandmother    • No Known Problems Paternal Grandfather    • No Known Problems Brother    • No Known Problems Brother    • No Known Problems Brother    • No Known Problems Daughter        Allergies:  No Known Allergies    Medications:  Current Outpatient Medications on File Prior to Visit   Medication Sig Dispense Refill   • albuterol (ProAir HFA) 90 mcg/act inhaler Inhale 2 puffs every 4 (four) hours as needed for wheezing 18 g 1   • benzonatate (TESSALON) 200 MG capsule Take 1 capsule (200 mg total) by mouth 2 (two) times a day as needed for cough 20 capsule 0   • cetirizine (ZyrTEC) 10 MG chewable tablet Chew 10 mg daily as needed      • Cholecalciferol (VITAMIN D3) 1000 units CHEW Chew 1 tablet daily     • fluticasone (FLONASE) 50 Sumeet Zimmerman MD       • dextrose (GLUTOSE) oral gel 15 g  15 g Oral Q15 Min PRN Sumeet Zimmerman MD       • docusate sodium (COLACE) capsule 100 mg  100 mg Oral BID Sumeet Zimmerman MD   100 mg at 12/11/20 0753   • enoxaparin (LOVENOX) syringe 40 mg  40 mg Subcutaneous Q12H Sumeet Zimmerman MD   40 mg at 12/11/20 0436   • gabapentin (NEURONTIN) capsule 800 mg  800 mg Oral Q8H Sumeet Zimmerman MD   800 mg at 12/11/20 0436   • glucagon (human recombinant) (GLUCAGEN DIAGNOSTIC) injection 1 mg  1 mg Subcutaneous Q15 Min PRN Sumeet Zimmerman MD       • insulin aspart (novoLOG) injection 0-7 Units  0-7 Units Subcutaneous TID AC Sumeet Zimmerman MD   5 Units at 12/11/20 0750   • insulin detemir (LEVEMIR) injection 20 Units  20 Units Subcutaneous Nightly Sumeet Zimmerman MD   20 Units at 12/10/20 2020   • losartan (COZAAR) tablet 50 mg  50 mg Oral Nightly Sumeet Zimmerman MD   50 mg at 12/10/20 2015   • melatonin tablet 9 mg  9 mg Oral Nightly Leandro Gonzalez MD   9 mg at 12/10/20 2016   • ondansetron (ZOFRAN) tablet 4 mg  4 mg Oral Q6H PRN Sumeet Zimmerman MD        Or   • ondansetron (ZOFRAN) injection 4 mg  4 mg Intravenous Q6H PRN Sumeet Zimmerman MD       • oxyCODONE-acetaminophen (PERCOCET)  MG per tablet 1 tablet  1 tablet Oral Q4H PRN Concepción Yepez APRN   1 tablet at 12/11/20 0446   • Pharmacy Consult - Remdesivir   Does not apply Continuous PRN Sumeet Zimmerman MD       • remdesivir 100 mg in sodium chloride 0.9 % 250 mL IVPB (powder vial)  100 mg Intravenous Q24H Sumeet Zimmerman MD   Stopped at 12/10/20 2022   • sertraline (ZOLOFT) tablet 100 mg  100 mg Oral Daily Sumeet Zimmerman MD   100 mg at 12/11/20 0752   • sodium chloride 0.9 % flush 10 mL  10 mL Intravenous PRN Trevor Nelson MD       • sodium chloride 0.9 % flush 10 mL  10 mL Intravenous Q12H Sumeet Zimmerman MD   10 mL at 12/11/20 0752   • sodium chloride 0.9 % flush 10 mL  10 mL Intravenous PRN Sumeet Zimmerman MD       • vitamin C (ASCORBIC  mcg/act nasal spray 1 spray into each nostril daily as needed      • multivitamin (THERAGRAN) TABS Take 1 tablet by mouth daily     • rizatriptan (MAXALT-MLT) 10 MG disintegrating tablet DISSOLVE ONE TABLET IN MOUTH at onset of headache  May repeat in two hours if ineffective 12 tablet 0   • tamoxifen (NOLVADEX) 20 mg tablet TAKE ONE TABLET BY MOUTH ONE TIME DAILY 90 tablet 1     No current facility-administered medications on file prior to visit  Physical Examination:  /76   Ht 5' 2" (1 575 m)   Wt 80 7 kg (178 lb)   BMI 32 56 kg/m²   Estimated body mass index is 32 56 kg/m² as calculated from the following:    Height as of this encounter: 5' 2" (1 575 m)  Weight as of this encounter: 80 7 kg (178 lb)  General: NAD, well appearing, AAOx3  HEENT: NCAT, EOMI, MMM, supple  Resp: Nonlabored  Heart: RRR  Abdomen: Soft, ND, NT, lipodystrophy of abdomen and flanks with moderate striae  Extremities/MSK: no LE edema, no obvious deficits in ROM  Neuro: grossly intact with no obvious deficits  Skin: no obvious lesions or rashes  Breast: no palpable mass, no palpable axillary lymphadenopathy, grade I ptosis (slightly increased on right c/w left), right larger c/w left, mild skin hyperpigmentation of left, well healed superior lumpectomy incision with corresponding concavity      Venita Lloyd, DO  Plastic and Reconstructive Surgery "ACID) tablet 3,000 mg  3,000 mg Oral Q4H Leandro Gonzalez MD   3,000 mg at 12/11/20 0751   • zinc sulfate (ZINCATE) capsule 220 mg  220 mg Oral BID Leandro Gonzalez MD   220 mg at 12/11/20 0752     Lab Results (last 24 hours)     Procedure Component Value Units Date/Time    D-dimer, Quantitative [[id number]]  (Abnormal) Collected: 12/11/20 0524    Specimen: Blood Updated: 12/11/20 0721     D-Dimer, Quantitative 777 ng/mL (FEU)     Narrative:      Dimer values <500 ng/ml FEU are FDA approved as aid in diagnosis of deep venous thrombosis and pulmonary embolism.  This test should not be used in an exclusion strategy with pretest probability alone.    A recent guideline regarding diagnosis for pulmonary thromboembolism recommends an adjusted exclusion criterion of age x 10 ng/ml FEU for patients >50 years of age ([employer] Intern Med 2015; 163: 701-711).      Fibrinogen [[id number]]  (Normal) Collected: 12/11/20 0524    Specimen: Blood Updated: 12/11/20 0721     Fibrinogen 506 mg/dL     Procalcitonin [[id number]]  (Normal) Collected: 12/11/20 0524    Specimen: Blood Updated: 12/11/20 0716     Procalcitonin 0.03 ng/mL     Narrative:      As a Marker for Sepsis (Non-Neonates):   1. <0.5 ng/mL represents a low risk of severe sepsis and/or septic shock.  1. >2 ng/mL represents a high risk of severe sepsis and/or septic shock.    As a Marker for Lower Respiratory Tract Infections that require antibiotic therapy:  PCT on Admission     Antibiotic Therapy             6-12 Hrs later  > 0.5                Strongly Recommended            >0.25 - <0.5         Recommended  0.1 - 0.25           Discouraged                   Remeasure/reassess PCT  <0.1                 Strongly Discouraged          Remeasure/reassess PCT      As 28 day mortality risk marker: \"Change in Procalcitonin Result\" (> 80 % or <=80 %) if Day 0 (or Day 1) and Day 4 values are available. Refer to http://www.[facility]-pct-calculator.com/   Change in PCT <=80 %   A " decrease of PCT levels below or equal to 80 % defines a positive change in PCT test result representing a higher risk for 28-day all-cause mortality of patients diagnosed with severe sepsis or septic shock.  Change in PCT > 80 %   A decrease of PCT levels of more than 80 % defines a negative change in PCT result representing a lower risk for 28-day all-cause mortality of patients diagnosed with severe sepsis or septic shock.                Results may be falsely decreased if patient taking Biotin.     Ferritin [[id number]]  (Abnormal) Collected: 12/11/20 0524    Specimen: Blood Updated: 12/11/20 0715     Ferritin 1,308.00 ng/mL     Narrative:      Results may be falsely decreased if patient taking Biotin.      Lactate Dehydrogenase [[id number]]  (Normal) Collected: 12/11/20 0524    Specimen: Blood Updated: 12/11/20 0704      U/L      Comment: Specimen hemolyzed.  Results may be affected.       Comprehensive Metabolic Panel [[id number]]  (Abnormal) Collected: 12/11/20 0524    Specimen: Blood Updated: 12/11/20 0704     Glucose 366 mg/dL      BUN 19 mg/dL      Creatinine 0.62 mg/dL      Sodium 125 mmol/L      Potassium 4.0 mmol/L      Comment: Slight hemolysis detected by analyzer. Results may be affected.        Chloride 94 mmol/L      CO2 21.0 mmol/L      Calcium 8.8 mg/dL      Total Protein 6.8 g/dL      Albumin 3.00 g/dL      ALT (SGPT) 23 U/L      AST (SGOT) 14 U/L      Alkaline Phosphatase 91 U/L      Total Bilirubin 0.5 mg/dL      eGFR Non African Amer 141 mL/min/1.73      Globulin 3.8 gm/dL      A/G Ratio 0.8 g/dL      BUN/Creatinine Ratio 30.6     Anion Gap 10.0 mmol/L     Narrative:      GFR Normal >60  Chronic Kidney Disease <60  Kidney Failure <15      CK [[id number]]  (Abnormal) Collected: 12/11/20 0524    Specimen: Blood Updated: 12/11/20 0704     Creatine Kinase 11 U/L     C-reactive Protein [[id number]]  (Abnormal) Collected: 12/11/20 0524    Specimen: Blood Updated: 12/11/20 0704     C-Reactive  Protein 0.69 mg/dL     Bilirubin, Direct [[id number]]  (Normal) Collected: 12/11/20 0524    Specimen: Blood Updated: 12/11/20 0704     Bilirubin, Direct 0.2 mg/dL     CBC & Differential [[id number]]  (Abnormal) Collected: 12/11/20 0524    Specimen: Blood Updated: 12/11/20 0658    Narrative:      The following orders were created for panel order CBC & Differential.  Procedure                               Abnormality         Status                     ---------                               -----------         ------                     CBC Auto Differential[[id number]]        Abnormal            Final result                 Please view results for these tests on the individual orders.    CBC Auto Differential [[id number]]  (Abnormal) Collected: 12/11/20 0524    Specimen: Blood Updated: 12/11/20 0658     WBC 12.51 10*3/mm3      RBC 4.89 10*6/mm3      Hemoglobin 14.0 g/dL      Hematocrit 40.4 %      MCV 82.6 fL      MCH 28.6 pg      MCHC 34.7 g/dL      RDW 13.1 %      RDW-SD 38.9 fl      MPV 9.4 fL      Platelets 552 10*3/mm3      Neutrophil % 68.3 %      Lymphocyte % 19.7 %      Monocyte % 7.4 %      Eosinophil % 0.4 %      Basophil % 0.3 %      Immature Grans % 3.9 %      Neutrophils, Absolute 8.53 10*3/mm3      Lymphocytes, Absolute 2.47 10*3/mm3      Monocytes, Absolute 0.93 10*3/mm3      Eosinophils, Absolute 0.05 10*3/mm3      Basophils, Absolute 0.04 10*3/mm3      Immature Grans, Absolute 0.49 10*3/mm3      nRBC 0.0 /100 WBC     Hemoglobin A1c [[id number]]  (Abnormal) Collected: 12/11/20 0524    Specimen: Blood Updated: 12/11/20 0655     Hemoglobin A1C 8.80 %     Narrative:      Hemoglobin A1C Ranges:    Increased Risk for Diabetes  5.7% to 6.4%  Diabetes                     >= 6.5%  Diabetic Goal                < 7.0%    Blood Culture - Blood, Arm, Left [[id number]] Collected: 12/08/20 1713    Specimen: Blood from Arm, Left Updated: 12/10/20 1730     Blood Culture No growth at 2 days    POC Glucose Once [[id number]]   (Abnormal) Collected: 12/10/20 1645    Specimen: Blood Updated: 12/10/20 1714     Glucose 344 mg/dL      Comment: RN NotifiedOperator: [id number] JHOAN Escamillaer ID: [id number]       Blood Culture - Blood, Arm, Right [[id number]] Collected: 12/08/20 1443    Specimen: Blood from Arm, Right Updated: 12/10/20 1500     Blood Culture No growth at 2 days    POC Glucose Once [[id number]]  (Abnormal) Collected: 12/10/20 1030    Specimen: Blood Updated: 12/10/20 1116     Glucose 276 mg/dL      Comment: RN NotifiedOperator: [id number] JHOAN JACKSONMeter ID: [id number]              Physician Progress Notes (last 24 hours) (Notes from 12/10/20 1025 through 12/11/20 1025)      Leandro Gonzalez MD at 12/10/20 1457              [facility] Medicine Services  INPATIENT PROGRESS NOTE    Length of Stay: 2  Date of Admission: 12/8/2020  Primary Care Physician: Efrem Deng MD    Subjective   Chief Complaint: shortness of breath with minimal effort    H&P by Dr. Zimmerman:  Patient is a 45-year-old male with past medical history notable for diabetes mellitus, hyperlipidemia, hypertension, and chronic pain.  Patient presented to the emergency department due to worsening dyspnea.  Per patient report he had tested positive for COVID-19 at outside facility prior to arrival today.  He states that he first tested positive for Thanksgiving and his wife was positive prior to that.  He notes he has been having worsening shortness of breath with exertion the last 2 to 3 days now.  He notes that he had cough initially but this seems improved today.  He also had some initial nausea and vomiting but this is also better.  He also noted some difficulties with headache but that also seems somewhat improved.  He denies any body aches.  He also notes that he has had issues with his sense of taste and smell.     Patient was noted to be hypoxic on room air to 88% so he was placed on supplemental  oxygen in the ED.  Chest x-ray was read as concerning for bilateral infiltrative changes consistent with Covid pneumonitis.  CBC showed a WBC of 15.1 with normal hemoglobin and hematocrit.  He did have a thrombosed cytosis with platelets of 2/5/1941.  Lactate, troponin T, and proBNP were all normal.  CMP shows a glucose of 229, creatinine 1.64, sodium 132, chloride 96, CO2 21, albumin 3.3.  Blood cultures were collected in the emergency department.    Review of Systems   Constitutional: Positive for fatigue. Negative for activity change.   HENT: Negative for ear pain and sore throat.    Eyes: Negative for pain and discharge.   Respiratory: Positive for cough and shortness of breath.    Cardiovascular: Negative for chest pain and palpitations.   Gastrointestinal: Negative for abdominal pain and nausea.   Endocrine: Negative for cold intolerance and heat intolerance.   Genitourinary: Negative for difficulty urinating and dysuria.   Musculoskeletal: Negative for arthralgias and gait problem.   Skin: Negative for color change and rash.   Neurological: Negative for dizziness and weakness.   Psychiatric/Behavioral: Negative for agitation and confusion.        Objective    Temp:  [96.5 °F (35.8 °C)-97 °F (36.1 °C)] 96.5 °F (35.8 °C)  Heart Rate:  [60-87] 78  Resp:  [16-18] 16  BP: ()/() 110/70    Physical Exam  Constitutional:       Appearance: He is well-developed. He is ill-appearing.   HENT:      Head: Normocephalic and atraumatic.      Nose: Nose normal.   Eyes:      Extraocular Movements: Extraocular movements intact.   Neck:      Musculoskeletal: Normal range of motion and neck supple.   Cardiovascular:      Rate and Rhythm: Normal rate and regular rhythm.   Pulmonary:      Effort: Pulmonary effort is normal.      Breath sounds: Rales present.   Abdominal:      General: Bowel sounds are normal.      Palpations: Abdomen is soft.   Musculoskeletal: Normal range of motion.   Skin:     General: Skin is warm  and dry.   Neurological:      Mental Status: He is alert and oriented to person, place, and time.   Psychiatric:         Behavior: Behavior normal.       Results Review:  I have reviewed the labs, radiology results, and diagnostic studies.    Laboratory Data:   Results from last 7 days   Lab Units 12/10/20  0610 12/09/20  0656 12/08/20  1351   SODIUM mmol/L 129* 130* 132*   POTASSIUM mmol/L 4.0 4.7 4.2   CHLORIDE mmol/L 96* 97* 96*   CO2 mmol/L 23.0 21.0* 21.0*   BUN mg/dL 18 16 19   CREATININE mg/dL 0.49* 0.54* 0.70*  0.64*   GLUCOSE mg/dL 259* 301* 229*   CALCIUM mg/dL 8.8 9.4 9.0   BILIRUBIN mg/dL 0.7 0.7 0.7  0.7   ALK PHOS U/L 69 66 73  73   ALT (SGPT) U/L 21 22 30  29   AST (SGOT) U/L 17 18 24  25   ANION GAP mmol/L 10.0 12.0 15.0     Estimated Creatinine Clearance: 238.9 mL/min (A) (by C-G formula based on SCr of 0.49 mg/dL (L)).          Results from last 7 days   Lab Units 12/10/20  0610 12/09/20  0656 12/08/20  1351   WBC 10*3/mm3 13.15* 10.70 15.10*   HEMOGLOBIN g/dL 13.9 14.1 15.4   HEMATOCRIT % 39.2 39.9 44.6   PLATELETS 10*3/mm3 526* 564* 542*           Culture Data:   Blood Culture   Date Value Ref Range Status   12/08/2020 No growth at 24 hours  Preliminary   12/08/2020 No growth at 24 hours  Preliminary     No results found for: URINECX  No results found for: RESPCX  No results found for: WOUNDCX  No results found for: STOOLCX  No components found for: BODYFLD    Radiology Data:   Imaging Results (Last 24 Hours)     ** No results found for the last 24 hours. **          I have reviewed the patient's current medications.     Assessment/Plan   Assessment and Plan    Acute respiratory failure with hypoxemia     Due to below; wean oxygen as able    Bilateral viral pneumonia     Caused by below; continue with supportive therapy    COVID 19 infection     On dexamethasone, remdesivir, lovenox; will add zinc and vitamin D3 and melatonin    Hyperglycemia on DM type 2     Mildly elevated likely due to  steroids use;     Continue with SSI       Chronic medical problems     Essential hypertension     Hyperlipidemia        Electronically signed by Leandro Gonzalez MD at 12/10/20 1604       Consult Notes (last 24 hours) (Notes from 12/10/20 1025 through 12/11/20 1025)    No notes of this type exist for this encounter.

## 2023-05-23 ENCOUNTER — TELEMEDICINE (OUTPATIENT)
Dept: ENDOCRINOLOGY | Facility: CLINIC | Age: 48
End: 2023-05-23
Payer: COMMERCIAL

## 2023-05-23 DIAGNOSIS — I10 PRIMARY HYPERTENSION: ICD-10-CM

## 2023-05-23 DIAGNOSIS — E11.42 DIABETIC POLYNEUROPATHY ASSOCIATED WITH TYPE 2 DIABETES MELLITUS: Primary | ICD-10-CM

## 2023-05-23 DIAGNOSIS — E11.65 TYPE 2 DIABETES MELLITUS WITH HYPERGLYCEMIA, WITHOUT LONG-TERM CURRENT USE OF INSULIN: ICD-10-CM

## 2023-05-23 DIAGNOSIS — E78.2 MIXED HYPERLIPIDEMIA: ICD-10-CM

## 2023-05-23 RX ORDER — SEMAGLUTIDE 1.34 MG/ML
1 INJECTION, SOLUTION SUBCUTANEOUS WEEKLY
Qty: 9 ML | Refills: 3 | Status: SHIPPED | OUTPATIENT
Start: 2023-05-23

## 2023-05-23 NOTE — PROGRESS NOTES
Chief Complaint  Diabetes     Subjective          Loser Diamond Hernandez presents to [facility] ENDOCRINOLOGY  History of Present Illness           You have chosen to receive care through a telehealth visit.  Do you consent to use a video/audio connection for your medical care today? Yes            TELEHEALTH VIDEO VISIT     This a video visit due to [facility] current guidelines for social distancing due to the COVID 19 pandemic      Mode of Visit: Video  Location of patient: home  You have chosen to receive care through a telehealth visit.  Does the patient consent to use a video/audio connection for your medical care today? Yes  The visit included audio and video interaction. No technical issues occurred during this visit.         Referring provider Aixa Deng MD     47 year old male presents for follow up     Diabetes mellitus type 2     Duration diagnosed in 2016        constant    Quality controlled    Severity moderate    Complications neuropathy        Diabetes regimen    GLP-1    Glucose monitoring    Fingersticks      States at goal     Under 130       Review of Systems - General ROS: negative                Objective   Vital Signs:   There were no vitals taken for this visit.    Physical Exam  Neurological:      General: No focal deficit present.      Mental Status: He is alert.   Psychiatric:         Mood and Affect: Mood normal.         Thought Content: Thought content normal.         Judgment: Judgment normal.        Result Review :   The following data was reviewed by: DAMAIN Mosquera on 05/06/2022:  Common labs        1/10/2023    07:36   Common Labs   Glucose 141     BUN 8     Creatinine 0.87     Sodium 140     Potassium 4.8     Chloride 100     Calcium 9.5     Total Protein 7.2     Albumin 4.5     Total Bilirubin 0.6     Alkaline Phosphatase 91     AST (SGOT) 27     ALT (SGPT) 52     WBC 6.5     Hemoglobin 16.0     Hematocrit 48.1     Platelets 287     Total  Cholesterol 197     Triglycerides 352     HDL Cholesterol 38     LDL Cholesterol  100     Hemoglobin A1C 6.5     Microalbumin, Urine <3.0                   Assessment and Plan    Diagnoses and all orders for this visit:    1. Diabetic polyneuropathy associated with type 2 diabetes mellitus (Primary)    2. Type 2 diabetes mellitus with hyperglycemia, without long-term current use of insulin  -     Semaglutide, 1 MG/DOSE, (Ozempic, 1 MG/DOSE,) 4 MG/3ML solution pen-injector; Inject 1 mg under the skin into the appropriate area as directed 1 (One) Time Per Week.  Dispense: 9 mL; Refill: 3    3. Primary hypertension    4. Mixed hyperlipidemia           Glycemic Management     Diabetes mellitus type 2 not controled due to hyperglycemia         Lab Results   Component Value Date    HGBA1C 6.5 (H) 01/10/2023           Taking Ozempic 1 mg weekly --keep                   Toujeo 20 units no longer taking      Am sugar should be 80 to 130     If you have an am sugar less than 80 decrease to 16 units         Lymjev sliding scale if needed for meals -not using         Blood sugar     Insulin     151-200           2 units  201-250           4 untis  251-300           6 nits  Above 301       8 units             Glipizide stopped      Metformin stopped due to side effects             Rules of 15 discussed for hypoglycemia            Goals for sugar     Fasting and before meals 80 to 130     2 hours after meals 180 or less        Aim for 60 grams of carbohydrate per meal     Aim for 15 grams of carbohydrate per snack         Lipid Management        Taking Lipitor 40 mg one at night         Total Cholesterol   Date Value Ref Range Status   02/10/2017 182 0 - 199 mg/dL Final     Triglycerides   Date Value Ref Range Status   01/10/2023 352 (H) 0 - 149 mg/dL Final   02/10/2017 503 (H) 20 - 199 mg/dL Final     HDL Cholesterol   Date Value Ref Range Status   01/10/2023 38 (L) >39 mg/dL Final   02/10/2017 35 (L) 60 - 200 mg/dL Final      LDL Chol Calc (NIH)   Date Value Ref Range Status   01/10/2023 100 (H) 0 - 99 mg/dL Final              Blood Pressure Management     Taking Cozaar 50 mg one daily         Microvascular Complication Monitoring        Last eye exam August 18, 2020  No DR         Neuropathy      Taking gabapentin 800 mg tid                   Lab Results   Component Value Date     MICROALBUR 4.4 08/10/2020         Bone Health              Lab Results   Component Value Date     CALCIUM 9.7 02/08/2021                  Other Diabetes Related Aspects        Lab Results   Component Value Date    EAKUOXVH95 804 11/29/2017       Thyroid health         Lab Results   Component Value Date    TSH 1.380 01/10/2023                    Follow Up   No follow-ups on file.  Patient was given instructions and counseling regarding his condition or for health maintenance advice. Please see specific information pulled into the AVS if appropriate.         This document has been electronically signed by DAMIAN Mosquera on May 23, 2023 08:22 CDT.

## 2023-08-10 ENCOUNTER — OFFICE VISIT (OUTPATIENT)
Dept: FAMILY MEDICINE CLINIC | Facility: CLINIC | Age: 48
End: 2023-08-10
Payer: COMMERCIAL

## 2023-08-10 VITALS
SYSTOLIC BLOOD PRESSURE: 124 MMHG | OXYGEN SATURATION: 95 % | WEIGHT: 268 LBS | HEIGHT: 70 IN | BODY MASS INDEX: 38.37 KG/M2 | TEMPERATURE: 98.2 F | DIASTOLIC BLOOD PRESSURE: 80 MMHG | HEART RATE: 78 BPM

## 2023-08-10 DIAGNOSIS — Z00.00 ROUTINE GENERAL MEDICAL EXAMINATION AT A HEALTH CARE FACILITY: ICD-10-CM

## 2023-08-10 DIAGNOSIS — Z12.12 SCREENING FOR COLORECTAL CANCER: ICD-10-CM

## 2023-08-10 DIAGNOSIS — E11.65 TYPE 2 DIABETES MELLITUS WITH HYPERGLYCEMIA, WITHOUT LONG-TERM CURRENT USE OF INSULIN: Primary | ICD-10-CM

## 2023-08-10 DIAGNOSIS — Z12.11 SCREENING FOR COLORECTAL CANCER: ICD-10-CM

## 2023-08-10 LAB
BILIRUB BLD-MCNC: NEGATIVE MG/DL
CLARITY, POC: CLEAR
COLOR UR: YELLOW
GLUCOSE UR STRIP-MCNC: NEGATIVE MG/DL
KETONES UR QL: NEGATIVE
LEUKOCYTE EST, POC: NEGATIVE
NITRITE UR-MCNC: NEGATIVE MG/ML
PH UR: 6 [PH] (ref 5–8)
PROT UR STRIP-MCNC: NEGATIVE MG/DL
RBC # UR STRIP: NEGATIVE /UL
SP GR UR: 1.02 (ref 1–1.03)
UROBILINOGEN UR QL: NORMAL

## 2023-08-10 NOTE — PROGRESS NOTES
Chief Complaint   Patient presents with    Annual Exam       History:  Trevor Hernandez is a 48 y.o. male who presents today for evaluation of the above problems.      HPI      ROS:  Review of Systems   Respiratory:  Negative for shortness of breath.    Cardiovascular:  Negative for chest pain and leg swelling.   Gastrointestinal:         Wants colorectal screening with Cologuard at this point   Endocrine: Negative for polydipsia, polyphagia and polyuria.        Increasing Ozempic to 2 mg a week-if did not improve with weight loss will think about bariatric surgery consult-patient concurs   Musculoskeletal:  Positive for arthralgias and back pain.   All other systems reviewed and are negative.    Allergies   Allergen Reactions    Ambien [Zolpidem Tartrate] Confusion     Past Medical History:   Diagnosis Date    Chronic back pain     Diabetes mellitus     Diabetic neuropathy     History of migraine headaches     Hypercholesterolemia     Hypercholesterolemia     Hypertension     Obesity     Pneumonia due to COVID-19 virus 12/8/2020     Past Surgical History:   Procedure Laterality Date    ANTERIOR LUMBAR EXPOSURE N/A 11/27/2017    Procedure: ANTERIOR LUMBAR EXPOSURE;  Surgeon: Devon Guan DO;  Location:  [facility] OR;  Service:     BACK SURGERY      X 2    LUMBAR FUSION N/A 11/27/2017    Procedure: ANTERIOR DECOMPRESSION, ANTERIOR LUMBAR INTERBODY FUSION WITH INSTRUMENTATION L5-S1;  Surgeon: SHERI Lunsford MD;  Location:  [facility] OR;  Service:     LUMBAR LAMINECTOMY WITH FUSION N/A 11/29/2017    Procedure: POSTERIOR SPINAL FUSION WITH INSTRUMENTATION L5-S1;  Surgeon: SHERI Lunsford MD;  Location:  [facility] OR;  Service:      Family History   Problem Relation Age of Onset    Diabetes Other     Heart disease Other     Diabetes Father     Heart disease Father       reports that he has never smoked. He has quit using smokeless tobacco.  His smokeless tobacco use included chew. He reports current alcohol use.  "He reports that he does not use drugs.      Current Outpatient Medications:     atorvastatin (Lipitor) 40 MG tablet, Take 1 tablet by mouth Every Night., Disp: 90 tablet, Rfl: 3    diclofenac (VOLTAREN) 75 MG EC tablet, , Disp: , Rfl:     Insulin Pen Needle (PEN NEEDLES) 31G X 8 MM misc, use as indicated 4 times daily., Disp: 120 each, Rfl: 11    losartan (COZAAR) 100 MG tablet, TAKE 1 TABLET BY MOUTH EVERY NIGHT., Disp: 90 tablet, Rfl: 3    oxyCODONE-acetaminophen (PERCOCET)  MG per tablet, Take 1 tablet by mouth Daily As Needed., Disp: , Rfl:     pantoprazole (PROTONIX) 40 MG EC tablet, Take 1 tablet by mouth Daily., Disp: 30 tablet, Rfl: 2    pregabalin (LYRICA) 150 MG capsule, Take 1 capsule by mouth 3 (Three) Times a Day., Disp: , Rfl:     Rimegepant Sulfate (NURTEC) 75 MG tablet dispersible tablet, Take 1 tablet by mouth 2 (Two) Times a Day As Needed (Headaches)., Disp: , Rfl:     Semaglutide, 1 MG/DOSE, (Ozempic, 1 MG/DOSE,) 4 MG/3ML solution pen-injector, Inject 1 mg under the skin into the appropriate area as directed 1 (One) Time Per Week., Disp: 9 mL, Rfl: 3    sertraline (ZOLOFT) 100 MG tablet, TAKE 1&1/2 TABLETS EVERY MORNING, Disp: 135 tablet, Rfl: 0    OBJECTIVE:  /80   Pulse 78   Temp 98.2 øF (36.8 øC)   Ht 177.8 cm (70\")   Wt 122 kg (268 lb)   SpO2 95%   BMI 38.45 kg/mý    Physical Exam  Vitals and nursing note reviewed.   Constitutional:       Appearance: Normal appearance. He is obese.   HENT:      Right Ear: Tympanic membrane and ear canal normal.      Left Ear: Tympanic membrane and ear canal normal.   Eyes:      Extraocular Movements: Extraocular movements intact.      Pupils: Pupils are equal, round, and reactive to light.   Neck:      Vascular: No carotid bruit.   Cardiovascular:      Rate and Rhythm: Normal rate and regular rhythm.      Pulses: Normal pulses.      Heart sounds: Normal heart sounds.   Pulmonary:      Effort: Pulmonary effort is normal.      Breath sounds: " Normal breath sounds.   Abdominal:      General: Abdomen is flat.      Palpations: Abdomen is soft.   Genitourinary:     Penis: Normal.       Testes: Normal.      Comments: No testicular masses inguinal hernia or adenopathy rectal deferred  Musculoskeletal:      Right lower leg: No edema.      Left lower leg: No edema.   Lymphadenopathy:      Cervical: No cervical adenopathy.   Skin:     General: Skin is warm and dry.   Neurological:      General: No focal deficit present.      Mental Status: He is alert and oriented to person, place, and time.   Psychiatric:         Mood and Affect: Mood normal.         Behavior: Behavior normal.         Thought Content: Thought content normal.         Judgment: Judgment normal.       Class 2 Severe Obesity (BMI >=35 and <=39.9). Obesity-related health conditions include the following: hypertension, diabetes mellitus, and osteoarthritis. Obesity is unchanged. BMI is is above average; BMI management plan is completed. We discussed portion control and increasing exercise.       Health Maintenance:  Immunization History   Administered Date(s) Administered    Fluzone >6mos 01/10/2023    Pneumococcal Conjugate 13-Valent (PCV13) 12/05/2018    Tdap 12/05/2018    flucelvax quad pfs =>4 YRS 12/05/2018, 10/11/2019    influenza Split 10/11/2016        Patient advised he is due a colonoscopy    Assessment/Plan    Diagnoses and all orders for this visit:    1. Type 2 diabetes mellitus with hyperglycemia, without long-term current use of insulin (Primary)  -     Hemoglobin A1c  -     POC Urinalysis Dipstick, Multipro  -     CT Cardiac Calcium Score Without Dye; Future    2. Routine general medical examination at a health care facility  -     TSH  -     T4, free  -     CBC & Differential  -     Comprehensive Metabolic Panel  -     Lipid Panel    3. Screening for colorectal cancer  -     Cologuard - Stool, Per Rectum; Future      Plan above-monitor weight loss-possible bariatric surgery  candidate    An After Visit Summary was printed and given to the patient at discharge.  Return in 6 months (on 2/10/2024).         Efrem Deng MD 8/10/2023   Electronically signed.

## 2023-08-11 LAB
ALBUMIN SERPL-MCNC: 4.2 G/DL (ref 3.5–5.2)
ALBUMIN/GLOB SERPL: 1.6 G/DL
ALP SERPL-CCNC: 84 U/L (ref 39–117)
ALT SERPL-CCNC: 37 U/L (ref 1–41)
AST SERPL-CCNC: 20 U/L (ref 1–40)
BASOPHILS # BLD AUTO: 0.06 10*3/MM3 (ref 0–0.2)
BASOPHILS NFR BLD AUTO: 0.9 % (ref 0–1.5)
BILIRUB SERPL-MCNC: 0.5 MG/DL (ref 0–1.2)
BUN SERPL-MCNC: 10 MG/DL (ref 6–20)
BUN/CREAT SERPL: 9.7 (ref 7–25)
CALCIUM SERPL-MCNC: 9.3 MG/DL (ref 8.6–10.5)
CHLORIDE SERPL-SCNC: 101 MMOL/L (ref 98–107)
CHOLEST SERPL-MCNC: 209 MG/DL (ref 0–200)
CO2 SERPL-SCNC: 28.5 MMOL/L (ref 22–29)
CREAT SERPL-MCNC: 1.03 MG/DL (ref 0.76–1.27)
EGFRCR SERPLBLD CKD-EPI 2021: 90.2 ML/MIN/1.73
EOSINOPHIL # BLD AUTO: 0.31 10*3/MM3 (ref 0–0.4)
EOSINOPHIL NFR BLD AUTO: 4.5 % (ref 0.3–6.2)
ERYTHROCYTE [DISTWIDTH] IN BLOOD BY AUTOMATED COUNT: 13.6 % (ref 12.3–15.4)
GLOBULIN SER CALC-MCNC: 2.7 GM/DL
GLUCOSE SERPL-MCNC: 152 MG/DL (ref 65–99)
HBA1C MFR BLD: 8 % (ref 4.8–5.6)
HCT VFR BLD AUTO: 45.1 % (ref 37.5–51)
HDLC SERPL-MCNC: 37 MG/DL (ref 40–60)
HGB BLD-MCNC: 15 G/DL (ref 13–17.7)
IMM GRANULOCYTES # BLD AUTO: 0.02 10*3/MM3 (ref 0–0.05)
IMM GRANULOCYTES NFR BLD AUTO: 0.3 % (ref 0–0.5)
LDLC SERPL CALC-MCNC: 88 MG/DL (ref 0–100)
LYMPHOCYTES # BLD AUTO: 2.36 10*3/MM3 (ref 0.7–3.1)
LYMPHOCYTES NFR BLD AUTO: 34.4 % (ref 19.6–45.3)
MCH RBC QN AUTO: 29.4 PG (ref 26.6–33)
MCHC RBC AUTO-ENTMCNC: 33.3 G/DL (ref 31.5–35.7)
MCV RBC AUTO: 88.4 FL (ref 79–97)
MONOCYTES # BLD AUTO: 0.58 10*3/MM3 (ref 0.1–0.9)
MONOCYTES NFR BLD AUTO: 8.4 % (ref 5–12)
NEUTROPHILS # BLD AUTO: 3.54 10*3/MM3 (ref 1.7–7)
NEUTROPHILS NFR BLD AUTO: 51.5 % (ref 42.7–76)
NRBC BLD AUTO-RTO: 0 /100 WBC (ref 0–0.2)
PLATELET # BLD AUTO: 229 10*3/MM3 (ref 140–450)
POTASSIUM SERPL-SCNC: 4.8 MMOL/L (ref 3.5–5.2)
PROT SERPL-MCNC: 6.9 G/DL (ref 6–8.5)
RBC # BLD AUTO: 5.1 10*6/MM3 (ref 4.14–5.8)
SODIUM SERPL-SCNC: 137 MMOL/L (ref 136–145)
T4 FREE SERPL-MCNC: 1.14 NG/DL (ref 0.93–1.7)
TRIGL SERPL-MCNC: 511 MG/DL (ref 0–150)
TSH SERPL DL<=0.005 MIU/L-ACNC: 1.91 UIU/ML (ref 0.27–4.2)
VLDLC SERPL CALC-MCNC: 84 MG/DL (ref 5–40)
WBC # BLD AUTO: 6.87 10*3/MM3 (ref 3.4–10.8)

## 2023-08-11 RX ORDER — SEMAGLUTIDE 2.68 MG/ML
2 INJECTION, SOLUTION SUBCUTANEOUS WEEKLY
Qty: 3 ML | Refills: 11 | Status: SHIPPED | OUTPATIENT
Start: 2023-08-11

## 2023-10-19 RX ORDER — SERTRALINE HYDROCHLORIDE 100 MG/1
TABLET, FILM COATED ORAL
Qty: 135 TABLET | Refills: 3 | Status: SHIPPED | OUTPATIENT
Start: 2023-10-19

## 2023-10-19 NOTE — TELEPHONE ENCOUNTER
Rx Refill Note  Requested Prescriptions     Pending Prescriptions Disp Refills    sertraline (ZOLOFT) 100 MG tablet [Pharmacy Med Name: sertraline 100 mg tablet] 135 tablet 0     Sig: TAKE 1&1/2 TABLETS EVERY MORNING      Last office visit with prescribing clinician: 8/10/2023   Last telemedicine visit with prescribing clinician: Visit date not found   Next office visit with prescribing clinician: 2/20/2024       Kamini Zhou MA  10/19/23, 08:31 CDT

## 2023-12-01 RX ORDER — BUSPIRONE HYDROCHLORIDE 15 MG/1
15 TABLET ORAL EVERY 6 HOURS PRN
Qty: 30 TABLET | Refills: 0 | Status: SHIPPED | OUTPATIENT
Start: 2023-12-01

## 2023-12-01 RX ORDER — ONDANSETRON 4 MG/1
TABLET, FILM COATED ORAL
Qty: 10 TABLET | Refills: 0 | Status: SHIPPED | OUTPATIENT
Start: 2023-12-01

## 2023-12-01 NOTE — TELEPHONE ENCOUNTER
Zoloft 4 mg 1 every 4-6 hours.  #10-BuSpar 15 mg continue Zoloft plus BuSpar No. 31 every 6 hours as needed anxiety

## 2023-12-01 NOTE — TELEPHONE ENCOUNTER
Rx Refill Note  Requested Prescriptions     Pending Prescriptions Disp Refills    ondansetron (Zofran) 4 MG tablet 10 tablet 0     Sig: Take 1 tablet every 4-6 hours as needed for nausea & vomiting.    busPIRone (BUSPAR) 15 MG tablet 30 tablet 0     Sig: Take 1 tablet by mouth Every 6 (Six) Hours As Needed (as needed for anxiety.).      Last office visit with prescribing clinician: 8/10/2023   Last telemedicine visit with prescribing clinician: Visit date not found   Next office visit with prescribing clinician: 2/20/2024                         Would you like a call back once the refill request has been completed: [] Yes [] No    If the office needs to give you a call back, can they leave a voicemail: [] Yes [] No    Lashonda Zafar Rep  12/01/23, 14:04 CST

## 2023-12-01 NOTE — TELEPHONE ENCOUNTER
Wife calling--stomach bug has been going around the family--wanting an rx for Zofran.  Also says his father has recently been placed on Hospice and in dying process--wanting to have something sent in for his nerves.    [facility]

## 2024-02-20 ENCOUNTER — OFFICE VISIT (OUTPATIENT)
Dept: FAMILY MEDICINE CLINIC | Facility: CLINIC | Age: 49
End: 2024-02-20
Payer: COMMERCIAL

## 2024-02-20 VITALS
RESPIRATION RATE: 16 BRPM | TEMPERATURE: 98.7 F | HEIGHT: 70 IN | SYSTOLIC BLOOD PRESSURE: 128 MMHG | BODY MASS INDEX: 35.28 KG/M2 | DIASTOLIC BLOOD PRESSURE: 74 MMHG | WEIGHT: 246.4 LBS | OXYGEN SATURATION: 99 % | HEART RATE: 82 BPM

## 2024-02-20 DIAGNOSIS — Z23 NEED FOR INFLUENZA VACCINATION: ICD-10-CM

## 2024-02-20 DIAGNOSIS — E11.65 TYPE 2 DIABETES MELLITUS WITH HYPERGLYCEMIA, WITHOUT LONG-TERM CURRENT USE OF INSULIN: Primary | ICD-10-CM

## 2024-02-20 DIAGNOSIS — E78.2 MIXED HYPERLIPIDEMIA: ICD-10-CM

## 2024-02-20 NOTE — PROGRESS NOTES
Subjective   Trevor Hernandez is a 48 y.o. male.     History of Present Illness  48-year-old diabetic on Ozempic follow-up 6 months      The following portions of the patient's history were reviewed and updated as appropriate: allergies, current medications, past family history, past medical history, past social history, past surgical history, and problem list.    Review of Systems   Respiratory:  Negative for shortness of breath.    Cardiovascular:  Negative for chest pain and leg swelling.   Endocrine: Negative for polydipsia, polyphagia and polyuria.        Doing great on Ozempic continues to lose weight   Musculoskeletal:  Positive for arthralgias and back pain.       Objective   Physical Exam  Vitals and nursing note reviewed.   Constitutional:       Appearance: He is obese.   Eyes:      Extraocular Movements: Extraocular movements intact.      Pupils: Pupils are equal, round, and reactive to light.   Cardiovascular:      Rate and Rhythm: Normal rate and regular rhythm.   Pulmonary:      Effort: Pulmonary effort is normal.      Breath sounds: Normal breath sounds.   Abdominal:      Palpations: Abdomen is soft.   Musculoskeletal:      Right lower leg: No edema.      Left lower leg: No edema.   Skin:     General: Skin is warm and dry.   Neurological:      General: No focal deficit present.      Mental Status: He is oriented to person, place, and time.   Psychiatric:         Mood and Affect: Mood normal.         Behavior: Behavior normal.         Thought Content: Thought content normal.         Judgment: Judgment normal.         Assessment & Plan   Diagnoses and all orders for this visit:    1. Type 2 diabetes mellitus with hyperglycemia, without long-term current use of insulin (Primary)  -     Hemoglobin A1c    2. Mixed hyperlipidemia  -     Comprehensive Metabolic Panel  -     Lipid Panel       Plan above-continue 2 mg Ozempic weekly-flu shot

## 2024-02-21 LAB
ALBUMIN SERPL-MCNC: 4.5 G/DL (ref 3.5–5.2)
ALBUMIN/GLOB SERPL: 1.6 G/DL
ALP SERPL-CCNC: 84 U/L (ref 39–117)
ALT SERPL-CCNC: 27 U/L (ref 1–41)
AST SERPL-CCNC: 16 U/L (ref 1–40)
BILIRUB SERPL-MCNC: 0.6 MG/DL (ref 0–1.2)
BUN SERPL-MCNC: 14 MG/DL (ref 6–20)
BUN/CREAT SERPL: 17.3 (ref 7–25)
CALCIUM SERPL-MCNC: 9.5 MG/DL (ref 8.6–10.5)
CHLORIDE SERPL-SCNC: 101 MMOL/L (ref 98–107)
CHOLEST SERPL-MCNC: 179 MG/DL (ref 0–200)
CO2 SERPL-SCNC: 21 MMOL/L (ref 22–29)
CREAT SERPL-MCNC: 0.81 MG/DL (ref 0.76–1.27)
EGFRCR SERPLBLD CKD-EPI 2021: 109.4 ML/MIN/1.73
GLOBULIN SER CALC-MCNC: 2.9 GM/DL
GLUCOSE SERPL-MCNC: 206 MG/DL (ref 65–99)
HBA1C MFR BLD: 7 % (ref 4.8–5.6)
HDLC SERPL-MCNC: 40 MG/DL (ref 40–60)
LDLC SERPL CALC-MCNC: 94 MG/DL (ref 0–100)
POTASSIUM SERPL-SCNC: 4.4 MMOL/L (ref 3.5–5.2)
PROT SERPL-MCNC: 7.4 G/DL (ref 6–8.5)
SODIUM SERPL-SCNC: 136 MMOL/L (ref 136–145)
TRIGL SERPL-MCNC: 268 MG/DL (ref 0–150)
VLDLC SERPL CALC-MCNC: 45 MG/DL (ref 5–40)

## 2024-06-27 DIAGNOSIS — I10 PRIMARY HYPERTENSION: Primary | ICD-10-CM

## 2024-06-27 RX ORDER — LOSARTAN POTASSIUM 100 MG/1
100 TABLET ORAL NIGHTLY
Qty: 90 TABLET | Refills: 0 | Status: SHIPPED | OUTPATIENT
Start: 2024-06-27

## 2024-06-27 NOTE — TELEPHONE ENCOUNTER
Rx Refill Note  Requested Prescriptions     Pending Prescriptions Disp Refills    losartan (COZAAR) 100 MG tablet [Pharmacy Med Name: losartan 100 mg tablet] 90 tablet 3     Sig: TAKE 1 TABLET BY MOUTH EVERY NIGHT      Last office visit with prescribing clinician: 2/20/2024   Last telemedicine visit with prescribing clinician: Visit date not found   Next office visit with prescribing clinician: 8/15/2024       {TIP  Please add Last Relevant Lab 2/20/24    Kamini Zhou MA  06/27/24, 08:07 CDT

## 2024-08-15 ENCOUNTER — OFFICE VISIT (OUTPATIENT)
Dept: FAMILY MEDICINE CLINIC | Facility: CLINIC | Age: 49
End: 2024-08-15
Payer: COMMERCIAL

## 2024-08-15 VITALS
HEART RATE: 78 BPM | WEIGHT: 251.4 LBS | HEIGHT: 70 IN | BODY MASS INDEX: 35.99 KG/M2 | DIASTOLIC BLOOD PRESSURE: 79 MMHG | SYSTOLIC BLOOD PRESSURE: 125 MMHG | TEMPERATURE: 98 F | OXYGEN SATURATION: 98 %

## 2024-08-15 DIAGNOSIS — E11.65 TYPE 2 DIABETES MELLITUS WITH HYPERGLYCEMIA, WITHOUT LONG-TERM CURRENT USE OF INSULIN: Primary | ICD-10-CM

## 2024-08-15 DIAGNOSIS — Z00.00 ROUTINE GENERAL MEDICAL EXAMINATION AT A HEALTH CARE FACILITY: ICD-10-CM

## 2024-08-15 DIAGNOSIS — Z23 NEED FOR PROPHYLACTIC VACCINATION AGAINST STREPTOCOCCUS PNEUMONIAE (PNEUMOCOCCUS): ICD-10-CM

## 2024-08-15 LAB
BILIRUB BLD-MCNC: NEGATIVE MG/DL
CLARITY, POC: CLEAR
COLOR UR: YELLOW
GLUCOSE UR STRIP-MCNC: ABNORMAL MG/DL
KETONES UR QL: NEGATIVE
LEUKOCYTE EST, POC: NEGATIVE
NITRITE UR-MCNC: NEGATIVE MG/ML
PH UR: 5.5 [PH] (ref 5–8)
PROT UR STRIP-MCNC: NEGATIVE MG/DL
RBC # UR STRIP: NEGATIVE /UL
SP GR UR: 1.03 (ref 1–1.03)
UROBILINOGEN UR QL: NORMAL

## 2024-08-15 PROCEDURE — 99396 PREV VISIT EST AGE 40-64: CPT | Performed by: FAMILY MEDICINE

## 2024-08-15 PROCEDURE — 81003 URINALYSIS AUTO W/O SCOPE: CPT | Performed by: FAMILY MEDICINE

## 2024-08-15 RX ORDER — BUPRENORPHINE HYDROCHLORIDE 150 UG/1
FILM, SOLUBLE BUCCAL
COMMUNITY
Start: 2024-07-24

## 2024-08-15 NOTE — PROGRESS NOTES
Chief Complaint   Patient presents with    Annual Exam     fasting       History:  Trevor Hernandez is a 49 y.o. male who presents today for evaluation of the above problems.      49-year-old male for annual physical          ROS:  Review of Systems   Eyes:         Each yearly eye exam   Respiratory:  Negative for shortness of breath.    Cardiovascular:  Negative for chest pain and leg swelling.        Foot exam done by another provider   Musculoskeletal:  Positive for back pain.        Back pain much improved since weight loss  occurred   All other systems reviewed and are negative.      Allergies   Allergen Reactions    Ambien [Zolpidem Tartrate] Confusion     Past Medical History:   Diagnosis Date    Chronic back pain     Diabetes mellitus     Diabetic neuropathy     History of migraine headaches     Hypercholesterolemia     Hypercholesterolemia     Hypertension     Obesity     Pneumonia due to COVID-19 virus 12/8/2020     Past Surgical History:   Procedure Laterality Date    ANTERIOR LUMBAR EXPOSURE N/A 11/27/2017    Procedure: ANTERIOR LUMBAR EXPOSURE;  Surgeon: Devon Guan DO;  Location:  [facility] OR;  Service:     BACK SURGERY      X 2    LUMBAR FUSION N/A 11/27/2017    Procedure: ANTERIOR DECOMPRESSION, ANTERIOR LUMBAR INTERBODY FUSION WITH INSTRUMENTATION L5-S1;  Surgeon: SHERI Lunsford MD;  Location:  [facility] OR;  Service:     LUMBAR LAMINECTOMY WITH FUSION N/A 11/29/2017    Procedure: POSTERIOR SPINAL FUSION WITH INSTRUMENTATION L5-S1;  Surgeon: SHERI Lunsford MD;  Location:  [facility] OR;  Service:      Family History   Problem Relation Age of Onset    Diabetes Other     Heart disease Other     Diabetes Father     Heart disease Father       reports that he has never smoked. He has quit using smokeless tobacco.  His smokeless tobacco use included chew. He reports current alcohol use. He reports that he does not use drugs.      Current Outpatient Medications:     atorvastatin (Lipitor) 40 MG  "tablet, Take 1 tablet by mouth Every Night., Disp: 90 tablet, Rfl: 3    Belbuca 150 MCG film, place ONE film bucally EVERY TWELVE HOURS AS NEEDED, Disp: , Rfl:     diclofenac (VOLTAREN) 75 MG EC tablet, , Disp: , Rfl:     Insulin Pen Needle (PEN NEEDLES) 31G X 8 MM misc, use as indicated 4 times daily., Disp: 120 each, Rfl: 11    losartan (COZAAR) 100 MG tablet, TAKE 1 TABLET BY MOUTH EVERY NIGHT, Disp: 90 tablet, Rfl: 0    ondansetron (Zofran) 4 MG tablet, Take 1 tablet every 4-6 hours as needed for nausea & vomiting., Disp: 10 tablet, Rfl: 0    pantoprazole (PROTONIX) 40 MG EC tablet, Take 1 tablet by mouth Daily., Disp: 30 tablet, Rfl: 2    pregabalin (LYRICA) 150 MG capsule, Take 1 capsule by mouth 3 (Three) Times a Day., Disp: , Rfl:     Rimegepant Sulfate (NURTEC) 75 MG tablet dispersible tablet, Take 1 tablet by mouth 2 (Two) Times a Day As Needed (Headaches)., Disp: , Rfl:     Semaglutide, 2 MG/DOSE, (Ozempic, 2 MG/DOSE,) 8 MG/3ML solution pen-injector, Inject 2 mg under the skin into the appropriate area as directed 1 (One) Time Per Week., Disp: 3 mL, Rfl: 11    sertraline (ZOLOFT) 100 MG tablet, TAKE 1&1/2 TABLETS EVERY MORNING, Disp: 135 tablet, Rfl: 3    OBJECTIVE:  /79 (BP Location: Left arm, Patient Position: Sitting, Cuff Size: Adult)   Pulse 78   Temp 98 °F (36.7 °C) (Temporal)   Ht 177.8 cm (70\")   Wt 114 kg (251 lb 6.4 oz)   SpO2 98%   BMI 36.07 kg/m²    Physical Exam  Vitals and nursing note reviewed.   Constitutional:       Appearance: He is obese.   HENT:      Right Ear: Tympanic membrane and ear canal normal.      Left Ear: Tympanic membrane and ear canal normal.   Eyes:      Extraocular Movements: Extraocular movements intact.      Pupils: Pupils are equal, round, and reactive to light.   Cardiovascular:      Rate and Rhythm: Normal rate and regular rhythm.      Pulses: Normal pulses.      Heart sounds: Normal heart sounds.   Pulmonary:      Effort: Pulmonary effort is normal.      " Breath sounds: Normal breath sounds.   Abdominal:      General: Abdomen is flat.      Palpations: Abdomen is soft. There is no mass.   Genitourinary:     Penis: Normal.       Testes: Normal.      Prostate: Normal.      Comments: No testicular masses inguinal hernia or adenopathy-prostate 1+ no nodules  Musculoskeletal:      Cervical back: Normal range of motion and neck supple.      Right lower leg: No edema.      Left lower leg: No edema.   Skin:     General: Skin is warm and dry.   Neurological:      General: No focal deficit present.      Mental Status: He is alert and oriented to person, place, and time.   Psychiatric:         Mood and Affect: Mood normal.         Behavior: Behavior normal.         Thought Content: Thought content normal.         Judgment: Judgment normal.         Class 2 Severe Obesity (BMI >=35 and <=39.9). Obesity-related health conditions include the following: hypertension, diabetes mellitus, dyslipidemias, and osteoarthritis. Obesity is improving with treatment. BMI is is above average; BMI management plan is completed. We discussed portion control and increasing exercise.       Health Maintenance:  Immunization History   Administered Date(s) Administered    Fluzone (or Fluarix & Flulaval for VFC) >6mos 01/10/2023, 02/20/2024    Influenza Seasonal Injectable 10/11/2016    Pneumococcal Conjugate 13-Valent (PCV13) 12/05/2018    Tdap 12/05/2018    flucelvax quad pfs =>4 YRS 12/05/2018, 10/11/2019    influenza Split 10/11/2016        Up-to-date after today    Assessment/Plan    Diagnoses and all orders for this visit:    1. Type 2 diabetes mellitus with hyperglycemia, without long-term current use of insulin (Primary)  -     MicroAlbumin, Urine, Random - Urine, Clean Catch  -     Hemoglobin A1c  -     POC Urinalysis Dipstick, Multipro    2. Routine general medical examination at a health care facility  -     TSH  -     T4, free  -     CBC & Differential  -     Cologuard - Stool, Per Rectum;  Future  -     Comprehensive Metabolic Panel  -     Lipid Panel    3. Need for prophylactic vaccination against Streptococcus pneumoniae (pneumococcus)  -     Pneumococcal Conjugate Vaccine 20-Valent (PCV20)      Plan advised to get flu shot and COVID shot when the round-sees endocrinologist    An After Visit Summary was printed and given to the patient at discharge.  Return in about 6 months (around 2/15/2025), or if symptoms worsen or fail to improve.         Efrem Deng MD 8/15/2024   Electronically signed.

## 2024-08-16 LAB
ALBUMIN SERPL-MCNC: 4.6 G/DL (ref 3.5–5.2)
ALBUMIN/GLOB SERPL: 1.5 G/DL
ALP SERPL-CCNC: 95 U/L (ref 39–117)
ALT SERPL-CCNC: 40 U/L (ref 1–41)
AST SERPL-CCNC: 25 U/L (ref 1–40)
BASOPHILS # BLD AUTO: 0.1 10*3/MM3 (ref 0–0.2)
BASOPHILS NFR BLD AUTO: 1.2 % (ref 0–1.5)
BILIRUB SERPL-MCNC: 0.6 MG/DL (ref 0–1.2)
BUN SERPL-MCNC: 11 MG/DL (ref 6–20)
BUN/CREAT SERPL: 12.8 (ref 7–25)
CALCIUM SERPL-MCNC: 9.5 MG/DL (ref 8.6–10.5)
CHLORIDE SERPL-SCNC: 97 MMOL/L (ref 98–107)
CHOLEST SERPL-MCNC: 177 MG/DL (ref 0–200)
CO2 SERPL-SCNC: 26.4 MMOL/L (ref 22–29)
CREAT SERPL-MCNC: 0.86 MG/DL (ref 0.76–1.27)
EGFRCR SERPLBLD CKD-EPI 2021: 106.8 ML/MIN/1.73
EOSINOPHIL # BLD AUTO: 0.25 10*3/MM3 (ref 0–0.4)
EOSINOPHIL NFR BLD AUTO: 2.9 % (ref 0.3–6.2)
ERYTHROCYTE [DISTWIDTH] IN BLOOD BY AUTOMATED COUNT: 13.2 % (ref 12.3–15.4)
GLOBULIN SER CALC-MCNC: 3.1 GM/DL
GLUCOSE SERPL-MCNC: 204 MG/DL (ref 65–99)
HBA1C MFR BLD: 7.8 % (ref 4.8–5.6)
HCT VFR BLD AUTO: 46.9 % (ref 37.5–51)
HDLC SERPL-MCNC: 30 MG/DL (ref 40–60)
HGB BLD-MCNC: 15.4 G/DL (ref 13–17.7)
IMM GRANULOCYTES # BLD AUTO: 0.03 10*3/MM3 (ref 0–0.05)
IMM GRANULOCYTES NFR BLD AUTO: 0.3 % (ref 0–0.5)
LDLC SERPL CALC-MCNC: 90 MG/DL (ref 0–100)
LYMPHOCYTES # BLD AUTO: 2.26 10*3/MM3 (ref 0.7–3.1)
LYMPHOCYTES NFR BLD AUTO: 26.3 % (ref 19.6–45.3)
MCH RBC QN AUTO: 28.6 PG (ref 26.6–33)
MCHC RBC AUTO-ENTMCNC: 32.8 G/DL (ref 31.5–35.7)
MCV RBC AUTO: 87.2 FL (ref 79–97)
MICROALBUMIN UR-MCNC: 5.2 UG/ML
MONOCYTES # BLD AUTO: 0.61 10*3/MM3 (ref 0.1–0.9)
MONOCYTES NFR BLD AUTO: 7.1 % (ref 5–12)
NEUTROPHILS # BLD AUTO: 5.33 10*3/MM3 (ref 1.7–7)
NEUTROPHILS NFR BLD AUTO: 62.2 % (ref 42.7–76)
NRBC BLD AUTO-RTO: 0 /100 WBC (ref 0–0.2)
PLATELET # BLD AUTO: 353 10*3/MM3 (ref 140–450)
POTASSIUM SERPL-SCNC: 4.4 MMOL/L (ref 3.5–5.2)
PROT SERPL-MCNC: 7.7 G/DL (ref 6–8.5)
RBC # BLD AUTO: 5.38 10*6/MM3 (ref 4.14–5.8)
SODIUM SERPL-SCNC: 136 MMOL/L (ref 136–145)
T4 FREE SERPL-MCNC: 1.25 NG/DL (ref 0.92–1.68)
TRIGL SERPL-MCNC: 342 MG/DL (ref 0–150)
TSH SERPL DL<=0.005 MIU/L-ACNC: 1.21 UIU/ML (ref 0.27–4.2)
VLDLC SERPL CALC-MCNC: 57 MG/DL (ref 5–40)
WBC # BLD AUTO: 8.58 10*3/MM3 (ref 3.4–10.8)

## 2024-10-08 DIAGNOSIS — F32.A DEPRESSION, UNSPECIFIED DEPRESSION TYPE: Primary | ICD-10-CM

## 2024-10-09 RX ORDER — SERTRALINE HYDROCHLORIDE 100 MG/1
TABLET, FILM COATED ORAL
Qty: 135 TABLET | Refills: 3 | Status: SHIPPED | OUTPATIENT
Start: 2024-10-09

## 2024-10-09 NOTE — TELEPHONE ENCOUNTER
Rx Refill Note  Requested Prescriptions     Pending Prescriptions Disp Refills    sertraline (ZOLOFT) 100 MG tablet [Pharmacy Med Name: SERTRALINE 100MG] 135 tablet 2     Sig: TAKE 1&1/2 TABLETS EVERY MORNING      Last office visit with prescribing clinician: Visit date not found   Last telemedicine visit with prescribing clinician: Visit date not found   Next office visit with prescribing clinician: Visit date not found   CPE done 8/15/2024                      Would you like a call back once the refill request has been completed: [] Yes [] No    If the office needs to give you a call back, can they leave a voicemail: [] Yes [] No    Kamini Marquez MA  10/09/24, 11:27 CDT

## 2024-10-15 DIAGNOSIS — I10 PRIMARY HYPERTENSION: ICD-10-CM

## 2024-10-15 RX ORDER — LOSARTAN POTASSIUM 100 MG/1
100 TABLET ORAL NIGHTLY
Qty: 90 TABLET | Refills: 2 | Status: SHIPPED | OUTPATIENT
Start: 2024-10-15

## 2024-10-15 NOTE — TELEPHONE ENCOUNTER
Rx Refill Note  Requested Prescriptions     Pending Prescriptions Disp Refills    losartan (COZAAR) 100 MG tablet [Pharmacy Med Name: LOSARTAN POT 100MG] 90 tablet 0     Sig: TAKE 1 TABLET BY MOUTH EVERY NIGHT      Last office visit with prescribing clinician: 8/15/2024   Last telemedicine visit with prescribing clinician: Visit date not found   Next office visit with prescribing clinician: 3/26/2025   CPE done 8/15/2024                  {TIP  Is Refill Pharmacy correct?: YES     Would you like a call back once the refill request has been completed: [] Yes [] No    If the office needs to give you a call back, can they leave a voicemail: [] Yes [] No    Kamini Marquez MA  10/15/24, 10:14 CDT

## 2025-02-03 NOTE — PLAN OF CARE
Goal Outcome Evaluation:  Plan of Care Reviewed With: patient  Progress: no change  Outcome Summary: vss, pt has been on room air thus far, pt does complain about shortness of air with activity but states that he is feeling much better than this morning. no other complaints at this time   Opt out

## 2025-03-26 ENCOUNTER — OFFICE VISIT (OUTPATIENT)
Dept: FAMILY MEDICINE CLINIC | Facility: CLINIC | Age: 50
End: 2025-03-26
Payer: COMMERCIAL

## 2025-03-26 VITALS
TEMPERATURE: 97.3 F | WEIGHT: 258.2 LBS | HEART RATE: 81 BPM | OXYGEN SATURATION: 95 % | RESPIRATION RATE: 18 BRPM | SYSTOLIC BLOOD PRESSURE: 124 MMHG | DIASTOLIC BLOOD PRESSURE: 82 MMHG | BODY MASS INDEX: 36.97 KG/M2 | HEIGHT: 70 IN

## 2025-03-26 DIAGNOSIS — E78.2 MIXED HYPERLIPIDEMIA: ICD-10-CM

## 2025-03-26 DIAGNOSIS — E11.65 TYPE 2 DIABETES MELLITUS WITH HYPERGLYCEMIA, WITHOUT LONG-TERM CURRENT USE OF INSULIN: Primary | ICD-10-CM

## 2025-03-26 NOTE — PROGRESS NOTES
Subjective   Trevor Hernandez is a 49 y.o. male.     History of Present Illness  49-year-old male follow-up diabetes hyperlipidemia      The following portions of the patient's history were reviewed and updated as appropriate: allergies, current medications, past family history, past medical history, past social history, past surgical history, and problem list.    Review of Systems   Respiratory:  Negative for shortness of breath.    Cardiovascular:  Negative for chest pain and leg swelling.   Endocrine: Negative for polydipsia, polyphagia and polyuria.        Patient may be more successful on Mounjaro another semaglutide besides Ozempic   Musculoskeletal:  Positive for arthralgias and back pain.       Objective   Physical Exam  Vitals and nursing note reviewed.   Constitutional:       Appearance: He is obese.   Eyes:      Extraocular Movements: Extraocular movements intact.      Pupils: Pupils are equal, round, and reactive to light.   Cardiovascular:      Rate and Rhythm: Normal rate and regular rhythm.   Pulmonary:      Effort: Pulmonary effort is normal.      Breath sounds: Normal breath sounds.   Musculoskeletal:      Right lower leg: No edema.      Left lower leg: No edema.   Neurological:      General: No focal deficit present.      Mental Status: He is alert and oriented to person, place, and time.   Psychiatric:         Mood and Affect: Mood normal.         Behavior: Behavior normal.         Thought Content: Thought content normal.         Judgment: Judgment normal.         Assessment & Plan   Diagnoses and all orders for this visit:    1. Type 2 diabetes mellitus with hyperglycemia, without long-term current use of insulin (Primary)  -     Hemoglobin A1c    2. Mixed hyperlipidemia  -     Comprehensive Metabolic Panel  -     Lipid Panel         Patient advised to return Cologuard test-also weight reduction needs to continue to help his back as well as his diabetic state

## 2025-03-27 LAB
ALBUMIN SERPL-MCNC: 4.2 G/DL (ref 3.5–5.2)
ALBUMIN/GLOB SERPL: 1.4 G/DL
ALP SERPL-CCNC: 118 U/L (ref 39–117)
ALT SERPL-CCNC: 43 U/L (ref 1–41)
AST SERPL-CCNC: 18 U/L (ref 1–40)
BILIRUB SERPL-MCNC: 0.5 MG/DL (ref 0–1.2)
BUN SERPL-MCNC: 14 MG/DL (ref 6–20)
BUN/CREAT SERPL: 16.3 (ref 7–25)
CALCIUM SERPL-MCNC: 9.1 MG/DL (ref 8.6–10.5)
CHLORIDE SERPL-SCNC: 95 MMOL/L (ref 98–107)
CHOLEST SERPL-MCNC: 201 MG/DL (ref 0–200)
CO2 SERPL-SCNC: 25.4 MMOL/L (ref 22–29)
CREAT SERPL-MCNC: 0.86 MG/DL (ref 0.76–1.27)
EGFRCR SERPLBLD CKD-EPI 2021: 106.8 ML/MIN/1.73
GLOBULIN SER CALC-MCNC: 3.1 GM/DL
GLUCOSE SERPL-MCNC: 320 MG/DL (ref 65–99)
HBA1C MFR BLD: 8.4 % (ref 4.8–5.6)
HDLC SERPL-MCNC: 42 MG/DL (ref 40–60)
LDLC SERPL CALC-MCNC: 102 MG/DL (ref 0–100)
POTASSIUM SERPL-SCNC: 4.8 MMOL/L (ref 3.5–5.2)
PROT SERPL-MCNC: 7.3 G/DL (ref 6–8.5)
SODIUM SERPL-SCNC: 135 MMOL/L (ref 136–145)
TRIGL SERPL-MCNC: 336 MG/DL (ref 0–150)
VLDLC SERPL CALC-MCNC: 57 MG/DL (ref 5–40)

## 2025-07-10 DIAGNOSIS — I10 PRIMARY HYPERTENSION: ICD-10-CM

## 2025-07-10 RX ORDER — LOSARTAN POTASSIUM 100 MG/1
100 TABLET ORAL NIGHTLY
Qty: 90 TABLET | Refills: 1 | Status: SHIPPED | OUTPATIENT
Start: 2025-07-10

## 2025-07-10 NOTE — TELEPHONE ENCOUNTER
Rx Refill Note  Requested Prescriptions     Pending Prescriptions Disp Refills    losartan (COZAAR) 100 MG tablet [Pharmacy Med Name: LOSARTAN POTASSIUM 100 MG TABLET] 90 tablet 1     Sig: TAKE 1 TABLET BY MOUTH EVERY NIGHT      Last office visit with prescribing clinician: Visit date not found   Last telemedicine visit with prescribing clinician: Visit date not found   Next office visit with prescribing clinician: Visit date not found                         Would you like a call back once the refill request has been completed: [] Yes [] No    If the office needs to give you a call back, can they leave a voicemail: [] Yes [] No    Kamini Marquez MA  07/10/25, 12:12 CDT

## 2025-08-28 ENCOUNTER — OFFICE VISIT (OUTPATIENT)
Dept: FAMILY MEDICINE CLINIC | Facility: CLINIC | Age: 50
End: 2025-08-28
Payer: COMMERCIAL

## 2025-08-28 VITALS
SYSTOLIC BLOOD PRESSURE: 136 MMHG | HEART RATE: 68 BPM | OXYGEN SATURATION: 97 % | BODY MASS INDEX: 36.36 KG/M2 | WEIGHT: 254 LBS | HEIGHT: 70 IN | RESPIRATION RATE: 18 BRPM | TEMPERATURE: 98.4 F | DIASTOLIC BLOOD PRESSURE: 82 MMHG

## 2025-08-28 DIAGNOSIS — Z12.12 ENCOUNTER FOR COLORECTAL CANCER SCREENING: ICD-10-CM

## 2025-08-28 DIAGNOSIS — Z00.00 ROUTINE GENERAL MEDICAL EXAMINATION AT A HEALTH CARE FACILITY: ICD-10-CM

## 2025-08-28 DIAGNOSIS — Z12.11 ENCOUNTER FOR COLORECTAL CANCER SCREENING: ICD-10-CM

## 2025-08-28 DIAGNOSIS — E78.2 MIXED HYPERLIPIDEMIA: ICD-10-CM

## 2025-08-28 DIAGNOSIS — E11.65 TYPE 2 DIABETES MELLITUS WITH HYPERGLYCEMIA, WITHOUT LONG-TERM CURRENT USE OF INSULIN: Primary | ICD-10-CM

## 2025-08-28 LAB
BILIRUB BLD-MCNC: ABNORMAL MG/DL
CLARITY, POC: CLEAR
COLOR UR: YELLOW
GLUCOSE UR STRIP-MCNC: NEGATIVE MG/DL
KETONES UR QL: NEGATIVE
LEUKOCYTE EST, POC: NEGATIVE
NITRITE UR-MCNC: NEGATIVE MG/ML
PH UR: 6 [PH] (ref 5–8)
PROT UR STRIP-MCNC: NEGATIVE MG/DL
RBC # UR STRIP: NEGATIVE /UL
SP GR UR: 1.02 (ref 1–1.03)
UROBILINOGEN UR QL: NORMAL

## 2025-08-28 RX ORDER — OXYCODONE AND ACETAMINOPHEN 10; 325 MG/1; MG/1
1 TABLET ORAL EVERY 8 HOURS PRN
COMMUNITY
Start: 2025-08-20

## 2025-08-28 RX ORDER — CYCLOBENZAPRINE HCL 10 MG
10 TABLET ORAL 3 TIMES DAILY PRN
COMMUNITY
Start: 2025-08-20

## 2025-08-28 RX ORDER — BUPRENORPHINE HYDROCHLORIDE 600 UG/1
1 FILM, SOLUBLE BUCCAL 2 TIMES DAILY
COMMUNITY
Start: 2025-08-04

## 2025-08-29 LAB
ALBUMIN SERPL-MCNC: 4.1 G/DL (ref 3.5–5.2)
ALBUMIN/CREAT UR: 3 MG/G CREAT (ref 0–29)
ALBUMIN/GLOB SERPL: 1.2 G/DL
ALP SERPL-CCNC: 75 U/L (ref 39–117)
ALT SERPL-CCNC: 31 U/L (ref 1–41)
AST SERPL-CCNC: 31 U/L (ref 1–40)
BASOPHILS # BLD AUTO: 0.06 10*3/MM3 (ref 0–0.2)
BASOPHILS NFR BLD AUTO: 0.9 % (ref 0–1.5)
BILIRUB SERPL-MCNC: 0.8 MG/DL (ref 0–1.2)
BUN SERPL-MCNC: 11 MG/DL (ref 6–20)
BUN/CREAT SERPL: 11.6 (ref 7–25)
CALCIUM SERPL-MCNC: 9.5 MG/DL (ref 8.6–10.5)
CHLORIDE SERPL-SCNC: 99 MMOL/L (ref 98–107)
CHOLEST SERPL-MCNC: 179 MG/DL (ref 0–200)
CO2 SERPL-SCNC: 22.3 MMOL/L (ref 22–29)
CREAT SERPL-MCNC: 0.95 MG/DL (ref 0.76–1.27)
CREAT UR-MCNC: 246.7 MG/DL
EGFRCR SERPLBLD CKD-EPI 2021: 98.1 ML/MIN/1.73
EOSINOPHIL # BLD AUTO: 0.19 10*3/MM3 (ref 0–0.4)
EOSINOPHIL NFR BLD AUTO: 3 % (ref 0.3–6.2)
ERYTHROCYTE [DISTWIDTH] IN BLOOD BY AUTOMATED COUNT: 13.1 % (ref 12.3–15.4)
GLOBULIN SER CALC-MCNC: 3.3 GM/DL
GLUCOSE SERPL-MCNC: 229 MG/DL (ref 65–99)
HBA1C MFR BLD: 9.2 % (ref 4.8–5.6)
HCT VFR BLD AUTO: 43.1 % (ref 37.5–51)
HDLC SERPL-MCNC: 30 MG/DL (ref 40–60)
HGB BLD-MCNC: 14.6 G/DL (ref 13–17.7)
IMM GRANULOCYTES # BLD AUTO: 0.03 10*3/MM3 (ref 0–0.05)
IMM GRANULOCYTES NFR BLD AUTO: 0.5 % (ref 0–0.5)
LDLC SERPL CALC-MCNC: 96 MG/DL (ref 0–100)
LPA SERPL-SCNC: 16 NMOL/L
LYMPHOCYTES # BLD AUTO: 1.77 10*3/MM3 (ref 0.7–3.1)
LYMPHOCYTES NFR BLD AUTO: 27.9 % (ref 19.6–45.3)
MCH RBC QN AUTO: 28.8 PG (ref 26.6–33)
MCHC RBC AUTO-ENTMCNC: 33.9 G/DL (ref 31.5–35.7)
MCV RBC AUTO: 85 FL (ref 79–97)
MICROALBUMIN UR-MCNC: 6.8 UG/ML
MONOCYTES # BLD AUTO: 0.46 10*3/MM3 (ref 0.1–0.9)
MONOCYTES NFR BLD AUTO: 7.2 % (ref 5–12)
NEUTROPHILS # BLD AUTO: 3.84 10*3/MM3 (ref 1.7–7)
NEUTROPHILS NFR BLD AUTO: 60.5 % (ref 42.7–76)
NRBC BLD AUTO-RTO: 0 /100 WBC (ref 0–0.2)
PLATELET # BLD AUTO: 270 10*3/MM3 (ref 140–450)
POTASSIUM SERPL-SCNC: 4.5 MMOL/L (ref 3.5–5.2)
PROT SERPL-MCNC: 7.4 G/DL (ref 6–8.5)
RBC # BLD AUTO: 5.07 10*6/MM3 (ref 4.14–5.8)
SODIUM SERPL-SCNC: 134 MMOL/L (ref 136–145)
T4 FREE SERPL-MCNC: 1.07 NG/DL (ref 0.92–1.68)
TRIGL SERPL-MCNC: 317 MG/DL (ref 0–150)
TSH SERPL DL<=0.005 MIU/L-ACNC: 1.5 UIU/ML (ref 0.27–4.2)
VLDLC SERPL CALC-MCNC: 53 MG/DL (ref 5–40)
WBC # BLD AUTO: 6.35 10*3/MM3 (ref 3.4–10.8)

## (undated) DEVICE — SPNG DISSCT SECTO KTTNER PK/5

## (undated) DEVICE — SUT MNCRYL 4/0 PS2 27IN UD MCP426H

## (undated) DEVICE — SUT PROLN 5/0 C1 DA 24IN 8725H

## (undated) DEVICE — WIPE THERAWASH SLV SPEC CARE 2PK

## (undated) DEVICE — BIPOLAR SEALER 23-113-1 AQM 2.3: Brand: AQUAMANTYS™

## (undated) DEVICE — SCANLAN® SURG-I-PAW® INSTRUMENT COVERS - RED, 1/10" X 5"/ 3 MMX13 CM (2 - 5" PCS /PKG): Brand: SCANLAN® SURG-I-PAW® INSTRUMENT COVERS

## (undated) DEVICE — PIN FIX CAYMAN SMOTH DISP

## (undated) DEVICE — GOWN,NON-REINFORCED,SIRUS,SET IN SLV,XXL: Brand: MEDLINE

## (undated) DEVICE — SYS ACC IPAS3 EMG I/O PED BVL

## (undated) DEVICE — DRP C/ARMOR

## (undated) DEVICE — ELECTRD BLD EDGE/INSUL1P 2.4X5.1MM STRL

## (undated) DEVICE — SUT SILK 2/0 SUTUPAK TIES 24IN SA75H

## (undated) DEVICE — KT RETR W/WR SS 1.4MM 1P/U

## (undated) DEVICE — PK TURNOVER RM ADV

## (undated) DEVICE — ANTIBACTERIAL UNDYED BRAIDED (POLYGLACTIN 910), SYNTHETIC ABSORBABLE SUTURE: Brand: COATED VICRYL

## (undated) DEVICE — SUT SILK 2/0 SH 75CM 30IN BLK C016D

## (undated) DEVICE — YANKAUER SUCTION INSTRUMENT WITHOUT CONTROL VENT, OPEN TIP, CLEAR: Brand: YANKAUER

## (undated) DEVICE — APPL CHLORAPREP W/TINT 26ML ORNG

## (undated) DEVICE — SPONGE,LAP,12"X12",XR,ST,5/PK,40PK/CS: Brand: MEDLINE

## (undated) DEVICE — GLV SURG NEOLON 2G PF LF 8 STRL

## (undated) DEVICE — DRAPE,UTILITY,TAPE,15X26,STERILE: Brand: MEDLINE

## (undated) DEVICE — GOWN,NON-REINFORCED,SIRUS,SET IN SLV,XL: Brand: MEDLINE

## (undated) DEVICE — SUT PDS 1 CTX 36IN VIO PDP371T

## (undated) DEVICE — GOWN,PREVENTION PLUS,XLONG/XLARGE,STRL: Brand: MEDLINE

## (undated) DEVICE — Device

## (undated) DEVICE — PK SPINE POST 30

## (undated) DEVICE — ELECTRD BLD EXT EDGE 1P COAT 6.5IN STRL

## (undated) DEVICE — SUT SILK 4/0 SUTUPAK TIES 24IN SA73H

## (undated) DEVICE — GLV SURG NEOLON 2G PF LF 7.5 STRL

## (undated) DEVICE — KT PKIT PROAXIS W/PRONEVIEW ACP TBG

## (undated) DEVICE — SPNG GZ WOVN 4X4IN 12PLY 10/BX STRL

## (undated) DEVICE — GLV SURG SENSICARE MICRO PF LF 9 STRL

## (undated) DEVICE — SUT SILK 3/0 SUTUPAK TIES 24IN SA74H

## (undated) DEVICE — LP VESL MAXI RED 2PK

## (undated) DEVICE — CATH IV ANGIO FEP 12G 3IN LTBLU 10PK

## (undated) DEVICE — DRSNG SURESITE WNDW 4X4.5

## (undated) DEVICE — CONN FLX BREATHE CIRCT

## (undated) DEVICE — TOTAL TRAY, 16FR 10ML SIL FOLEY, URN: Brand: MEDLINE

## (undated) DEVICE — SUT SILK 0 SUTUPAK TIES 24IN SA76G